# Patient Record
Sex: FEMALE | Race: WHITE | NOT HISPANIC OR LATINO | Employment: UNEMPLOYED | URBAN - METROPOLITAN AREA
[De-identification: names, ages, dates, MRNs, and addresses within clinical notes are randomized per-mention and may not be internally consistent; named-entity substitution may affect disease eponyms.]

---

## 2024-01-01 ENCOUNTER — APPOINTMENT (OUTPATIENT)
Dept: GENERAL RADIOLOGY | Facility: HOSPITAL | Age: 59
DRG: 321 | End: 2024-01-01
Payer: MEDICAID

## 2024-01-01 ENCOUNTER — APPOINTMENT (OUTPATIENT)
Dept: CT IMAGING | Facility: HOSPITAL | Age: 59
DRG: 321 | End: 2024-01-01
Payer: MEDICAID

## 2024-01-01 ENCOUNTER — HOSPITAL ENCOUNTER (INPATIENT)
Facility: HOSPITAL | Age: 59
LOS: 13 days | DRG: 321 | End: 2024-11-04
Attending: EMERGENCY MEDICINE | Admitting: INTERNAL MEDICINE
Payer: MEDICAID

## 2024-01-01 ENCOUNTER — APPOINTMENT (OUTPATIENT)
Dept: CARDIOLOGY | Facility: HOSPITAL | Age: 59
DRG: 321 | End: 2024-01-01
Payer: MEDICAID

## 2024-01-01 ENCOUNTER — APPOINTMENT (OUTPATIENT)
Dept: MRI IMAGING | Facility: HOSPITAL | Age: 59
DRG: 321 | End: 2024-01-01
Payer: MEDICAID

## 2024-01-01 ENCOUNTER — ANESTHESIA (OUTPATIENT)
Dept: GASTROENTEROLOGY | Facility: HOSPITAL | Age: 59
End: 2024-01-01
Payer: MEDICAID

## 2024-01-01 ENCOUNTER — ANESTHESIA EVENT (OUTPATIENT)
Dept: GASTROENTEROLOGY | Facility: HOSPITAL | Age: 59
End: 2024-01-01
Payer: MEDICAID

## 2024-01-01 VITALS
HEIGHT: 61 IN | WEIGHT: 131.84 LBS | TEMPERATURE: 97.3 F | BODY MASS INDEX: 24.89 KG/M2 | SYSTOLIC BLOOD PRESSURE: 147 MMHG | DIASTOLIC BLOOD PRESSURE: 101 MMHG

## 2024-01-01 DIAGNOSIS — Z91.148 H/O MEDICATION NONCOMPLIANCE: ICD-10-CM

## 2024-01-01 DIAGNOSIS — J81.0 ACUTE PULMONARY EDEMA: ICD-10-CM

## 2024-01-01 DIAGNOSIS — I69.391 DYSPHAGIA AS LATE EFFECT OF CEREBROVASCULAR ACCIDENT (CVA): ICD-10-CM

## 2024-01-01 DIAGNOSIS — F17.200 SMOKER: ICD-10-CM

## 2024-01-01 DIAGNOSIS — I21.3 ACUTE ST ELEVATION MYOCARDIAL INFARCTION (STEMI), UNSPECIFIED ARTERY: Primary | ICD-10-CM

## 2024-01-01 LAB
ALBUMIN SERPL-MCNC: 2.4 G/DL (ref 3.5–5.2)
ALBUMIN SERPL-MCNC: 2.5 G/DL (ref 3.5–5.2)
ALBUMIN SERPL-MCNC: 2.5 G/DL (ref 3.5–5.2)
ALBUMIN SERPL-MCNC: 2.6 G/DL (ref 3.5–5.2)
ALBUMIN SERPL-MCNC: 2.7 G/DL (ref 3.5–5.2)
ALBUMIN SERPL-MCNC: 2.8 G/DL (ref 3.5–5.2)
ALBUMIN SERPL-MCNC: 2.8 G/DL (ref 3.5–5.2)
ALBUMIN SERPL-MCNC: 3 G/DL (ref 3.5–5.2)
ALBUMIN SERPL-MCNC: 3 G/DL (ref 3.5–5.2)
ALBUMIN SERPL-MCNC: 3.1 G/DL (ref 3.5–5.2)
ALBUMIN SERPL-MCNC: 3.2 G/DL (ref 3.5–5.2)
ALBUMIN SERPL-MCNC: 3.8 G/DL (ref 3.5–5.2)
ALBUMIN/GLOB SERPL: 0.5 G/DL
ALBUMIN/GLOB SERPL: 0.6 G/DL
ALBUMIN/GLOB SERPL: 0.7 G/DL
ALBUMIN/GLOB SERPL: 0.7 G/DL
ALBUMIN/GLOB SERPL: 0.9 G/DL
ALBUMIN/GLOB SERPL: 0.9 G/DL
ALP SERPL-CCNC: 115 U/L (ref 39–117)
ALP SERPL-CCNC: 153 U/L (ref 39–117)
ALP SERPL-CCNC: 154 U/L (ref 39–117)
ALP SERPL-CCNC: 159 U/L (ref 39–117)
ALP SERPL-CCNC: 159 U/L (ref 39–117)
ALP SERPL-CCNC: 165 U/L (ref 39–117)
ALP SERPL-CCNC: 183 U/L (ref 39–117)
ALP SERPL-CCNC: 185 U/L (ref 39–117)
ALP SERPL-CCNC: 194 U/L (ref 39–117)
ALT SERPL W P-5'-P-CCNC: 24 U/L (ref 1–33)
ALT SERPL W P-5'-P-CCNC: 32 U/L (ref 1–33)
ALT SERPL W P-5'-P-CCNC: 33 U/L (ref 1–33)
ALT SERPL W P-5'-P-CCNC: 34 U/L (ref 1–33)
ALT SERPL W P-5'-P-CCNC: 37 U/L (ref 1–33)
ALT SERPL W P-5'-P-CCNC: 38 U/L (ref 1–33)
ALT SERPL W P-5'-P-CCNC: 38 U/L (ref 1–33)
ALT SERPL W P-5'-P-CCNC: 39 U/L (ref 1–33)
ALT SERPL W P-5'-P-CCNC: 41 U/L (ref 1–33)
AMPHET+METHAMPHET UR QL: POSITIVE
ANION GAP SERPL CALCULATED.3IONS-SCNC: 10.7 MMOL/L (ref 5–15)
ANION GAP SERPL CALCULATED.3IONS-SCNC: 10.9 MMOL/L (ref 5–15)
ANION GAP SERPL CALCULATED.3IONS-SCNC: 11 MMOL/L (ref 5–15)
ANION GAP SERPL CALCULATED.3IONS-SCNC: 11 MMOL/L (ref 5–15)
ANION GAP SERPL CALCULATED.3IONS-SCNC: 11.2 MMOL/L (ref 5–15)
ANION GAP SERPL CALCULATED.3IONS-SCNC: 11.7 MMOL/L (ref 5–15)
ANION GAP SERPL CALCULATED.3IONS-SCNC: 11.7 MMOL/L (ref 5–15)
ANION GAP SERPL CALCULATED.3IONS-SCNC: 12.3 MMOL/L (ref 5–15)
ANION GAP SERPL CALCULATED.3IONS-SCNC: 12.6 MMOL/L (ref 5–15)
ANION GAP SERPL CALCULATED.3IONS-SCNC: 14 MMOL/L (ref 5–15)
ANION GAP SERPL CALCULATED.3IONS-SCNC: 14.7 MMOL/L (ref 5–15)
ANION GAP SERPL CALCULATED.3IONS-SCNC: 15 MMOL/L (ref 5–15)
ANION GAP SERPL CALCULATED.3IONS-SCNC: 16 MMOL/L (ref 5–15)
ANION GAP SERPL CALCULATED.3IONS-SCNC: 8.4 MMOL/L (ref 5–15)
ANION GAP SERPL CALCULATED.3IONS-SCNC: 9.3 MMOL/L (ref 5–15)
ANION GAP SERPL CALCULATED.3IONS-SCNC: 9.8 MMOL/L (ref 5–15)
AORTIC DIMENSIONLESS INDEX: 0.6 (DI)
APTT PPP: 133.1 SECONDS (ref 22.7–35.4)
APTT PPP: 22.4 SECONDS (ref 22.7–35.4)
APTT PPP: 23.6 SECONDS (ref 22.7–35.4)
APTT PPP: 49.8 SECONDS (ref 22.7–35.4)
ARTERIAL PATENCY WRIST A: ABNORMAL
ARTERIAL PATENCY WRIST A: POSITIVE
ASCENDING AORTA: 2.7 CM
AST SERPL-CCNC: 138 U/L (ref 1–32)
AST SERPL-CCNC: 39 U/L (ref 1–32)
AST SERPL-CCNC: 41 U/L (ref 1–32)
AST SERPL-CCNC: 41 U/L (ref 1–32)
AST SERPL-CCNC: 46 U/L (ref 1–32)
AST SERPL-CCNC: 51 U/L (ref 1–32)
AST SERPL-CCNC: 58 U/L (ref 1–32)
AST SERPL-CCNC: 62 U/L (ref 1–32)
AST SERPL-CCNC: 80 U/L (ref 1–32)
ATMOSPHERIC PRESS: 749.4 MMHG
ATMOSPHERIC PRESS: 751 MMHG
ATMOSPHERIC PRESS: 751.6 MMHG
ATMOSPHERIC PRESS: 752.8 MMHG
ATMOSPHERIC PRESS: 753.4 MMHG
B-OH-BUTYR SERPL-SCNC: 0.09 MMOL/L (ref 0.02–0.27)
BACTERIA SPEC AEROBE CULT: ABNORMAL
BACTERIA SPEC AEROBE CULT: NORMAL
BACTERIA SPEC AEROBE CULT: NORMAL
BACTERIA SPEC RESP CULT: ABNORMAL
BACTERIA UR QL AUTO: ABNORMAL /HPF
BACTERIA UR QL AUTO: ABNORMAL /HPF
BARBITURATES UR QL SCN: NEGATIVE
BASE EXCESS BLDA CALC-SCNC: -0.9 MMOL/L (ref 0–2)
BASE EXCESS BLDA CALC-SCNC: 0.4 MMOL/L (ref 0–2)
BASE EXCESS BLDA CALC-SCNC: 2.1 MMOL/L (ref 0–2)
BASE EXCESS BLDA CALC-SCNC: 3.5 MMOL/L (ref 0–2)
BASE EXCESS BLDA CALC-SCNC: 4.7 MMOL/L (ref 0–2)
BASOPHILS # BLD AUTO: 0.06 10*3/MM3 (ref 0–0.2)
BASOPHILS # BLD AUTO: 0.06 10*3/MM3 (ref 0–0.2)
BASOPHILS # BLD AUTO: 0.07 10*3/MM3 (ref 0–0.2)
BASOPHILS NFR BLD AUTO: 0.4 % (ref 0–1.5)
BDY SITE: ABNORMAL
BENZODIAZ UR QL SCN: POSITIVE
BH CV ECHO MEAS - ACS: 1.71 CM
BH CV ECHO MEAS - AO MAX PG: 6.9 MMHG
BH CV ECHO MEAS - AO MEAN PG: 4 MMHG
BH CV ECHO MEAS - AO ROOT DIAM: 2.9 CM
BH CV ECHO MEAS - AO V2 MAX: 131 CM/SEC
BH CV ECHO MEAS - AO V2 VTI: 15.5 CM
BH CV ECHO MEAS - AVA(I,D): 1.68 CM2
BH CV ECHO MEAS - EDV(CUBED): 65.7 ML
BH CV ECHO MEAS - EDV(MOD-SP2): 112 ML
BH CV ECHO MEAS - EDV(MOD-SP4): 105 ML
BH CV ECHO MEAS - EF(MOD-BP): 14.2 %
BH CV ECHO MEAS - EF(MOD-SP2): 15.2 %
BH CV ECHO MEAS - EF(MOD-SP4): 14.3 %
BH CV ECHO MEAS - ESV(MOD-SP2): 95 ML
BH CV ECHO MEAS - ESV(MOD-SP4): 90 ML
BH CV ECHO MEAS - IVS/LVPW: 0.66 CM
BH CV ECHO MEAS - IVSD: 1.25 CM
BH CV ECHO MEAS - LAT PEAK E' VEL: 5.4 CM/SEC
BH CV ECHO MEAS - LV MASS(C)D: 251.6 GRAMS
BH CV ECHO MEAS - LV MAX PG: 3 MMHG
BH CV ECHO MEAS - LV MEAN PG: 2 MMHG
BH CV ECHO MEAS - LV V1 MAX: 87 CM/SEC
BH CV ECHO MEAS - LV V1 VTI: 10 CM
BH CV ECHO MEAS - LVIDD: 4 CM
BH CV ECHO MEAS - LVOT AREA: 2.6 CM2
BH CV ECHO MEAS - LVOT DIAM: 1.82 CM
BH CV ECHO MEAS - LVPWD: 1.88 CM
BH CV ECHO MEAS - MED PEAK E' VEL: 4.4 CM/SEC
BH CV ECHO MEAS - MR MAX PG: 72.8 MMHG
BH CV ECHO MEAS - MR MAX VEL: 426.7 CM/SEC
BH CV ECHO MEAS - MV A DUR: 0.08 SEC
BH CV ECHO MEAS - MV A MAX VEL: 68.6 CM/SEC
BH CV ECHO MEAS - MV DEC SLOPE: 942.5 CM/SEC2
BH CV ECHO MEAS - MV DEC TIME: 0.1 SEC
BH CV ECHO MEAS - MV E MAX VEL: 54.4 CM/SEC
BH CV ECHO MEAS - MV E/A: 0.79
BH CV ECHO MEAS - MV MAX PG: 2.9 MMHG
BH CV ECHO MEAS - MV MEAN PG: 1.83 MMHG
BH CV ECHO MEAS - MV P1/2T: 25.9 MSEC
BH CV ECHO MEAS - MV V2 VTI: 14.4 CM
BH CV ECHO MEAS - MVA(P1/2T): 8.5 CM2
BH CV ECHO MEAS - MVA(VTI): 1.81 CM2
BH CV ECHO MEAS - PA ACC TIME: 0.06 SEC
BH CV ECHO MEAS - PA V2 MAX: 90.2 CM/SEC
BH CV ECHO MEAS - PULM A REVS DUR: 0.07 SEC
BH CV ECHO MEAS - PULM A REVS VEL: 18 CM/SEC
BH CV ECHO MEAS - PULM DIAS VEL: 39.8 CM/SEC
BH CV ECHO MEAS - PULM S/D: 0.59
BH CV ECHO MEAS - PULM SYS VEL: 23.6 CM/SEC
BH CV ECHO MEAS - RAP SYSTOLE: 8 MMHG
BH CV ECHO MEAS - RV MAX PG: 2.25 MMHG
BH CV ECHO MEAS - RV V1 MAX: 75 CM/SEC
BH CV ECHO MEAS - RV V1 VTI: 8 CM
BH CV ECHO MEAS - SUP REN AO DIAM: 2.1 CM
BH CV ECHO MEAS - SV(LVOT): 26 ML
BH CV ECHO MEAS - SV(MOD-SP2): 17 ML
BH CV ECHO MEAS - SV(MOD-SP4): 15 ML
BH CV ECHO MEAS - TAPSE (>1.6): 0.96 CM
BH CV ECHO MEASUREMENTS AVERAGE E/E' RATIO: 11.1
BH CV XLRA - RV BASE: 2.6 CM
BH CV XLRA - RV LENGTH: 5.3 CM
BH CV XLRA - RV MID: 2.01 CM
BH CV XLRA - TDI S': 8.6 CM/SEC
BH CV XLRA MEAS LEFT DIST CCA EDV: -20 CM/SEC
BH CV XLRA MEAS LEFT DIST CCA PSV: -55.3 CM/SEC
BH CV XLRA MEAS LEFT DIST ICA EDV: -27.6 CM/SEC
BH CV XLRA MEAS LEFT DIST ICA PSV: -49.9 CM/SEC
BH CV XLRA MEAS LEFT ICA/CCA RATIO: 0.9
BH CV XLRA MEAS LEFT MID ICA EDV: -23.8 CM/SEC
BH CV XLRA MEAS LEFT MID ICA PSV: -47 CM/SEC
BH CV XLRA MEAS LEFT PROX CCA EDV: 24.3 CM/SEC
BH CV XLRA MEAS LEFT PROX CCA PSV: 58.4 CM/SEC
BH CV XLRA MEAS LEFT PROX ECA EDV: -12.3 CM/SEC
BH CV XLRA MEAS LEFT PROX ECA PSV: -78.6 CM/SEC
BH CV XLRA MEAS LEFT PROX ICA EDV: -18.2 CM/SEC
BH CV XLRA MEAS LEFT PROX ICA PSV: -48 CM/SEC
BH CV XLRA MEAS LEFT PROX SCLA PSV: 86.4 CM/SEC
BH CV XLRA MEAS LEFT VERTEBRAL A EDV: 13.2 CM/SEC
BH CV XLRA MEAS LEFT VERTEBRAL A PSV: 26.6 CM/SEC
BH CV XLRA MEAS RIGHT DIST CCA EDV: -16.3 CM/SEC
BH CV XLRA MEAS RIGHT DIST CCA PSV: -40 CM/SEC
BH CV XLRA MEAS RIGHT DIST ICA EDV: -29.3 CM/SEC
BH CV XLRA MEAS RIGHT DIST ICA PSV: -53.2 CM/SEC
BH CV XLRA MEAS RIGHT ICA/CCA RATIO: 1.33
BH CV XLRA MEAS RIGHT MID ICA EDV: -25.3 CM/SEC
BH CV XLRA MEAS RIGHT MID ICA PSV: -48.1 CM/SEC
BH CV XLRA MEAS RIGHT PROX CCA EDV: 13.3 CM/SEC
BH CV XLRA MEAS RIGHT PROX CCA PSV: 56.6 CM/SEC
BH CV XLRA MEAS RIGHT PROX ECA EDV: -11.9 CM/SEC
BH CV XLRA MEAS RIGHT PROX ECA PSV: -83.9 CM/SEC
BH CV XLRA MEAS RIGHT PROX ICA EDV: -24.1 CM/SEC
BH CV XLRA MEAS RIGHT PROX ICA PSV: -43.5 CM/SEC
BH CV XLRA MEAS RIGHT PROX SCLA PSV: 92.7 CM/SEC
BH CV XLRA MEAS RIGHT VERTEBRAL A EDV: 22 CM/SEC
BH CV XLRA MEAS RIGHT VERTEBRAL A PSV: 44.7 CM/SEC
BILIRUB CONJ SERPL-MCNC: <0.2 MG/DL (ref 0–0.3)
BILIRUB INDIRECT SERPL-MCNC: ABNORMAL MG/DL
BILIRUB SERPL-MCNC: 0.2 MG/DL (ref 0–1.2)
BILIRUB SERPL-MCNC: 0.3 MG/DL (ref 0–1.2)
BILIRUB SERPL-MCNC: 0.4 MG/DL (ref 0–1.2)
BILIRUB SERPL-MCNC: 0.5 MG/DL (ref 0–1.2)
BILIRUB SERPL-MCNC: 0.6 MG/DL (ref 0–1.2)
BILIRUB SERPL-MCNC: 0.7 MG/DL (ref 0–1.2)
BILIRUB UR QL STRIP: NEGATIVE
BILIRUB UR QL STRIP: NEGATIVE
BUN SERPL-MCNC: 21 MG/DL (ref 6–20)
BUN SERPL-MCNC: 22 MG/DL (ref 6–20)
BUN SERPL-MCNC: 22 MG/DL (ref 6–20)
BUN SERPL-MCNC: 30 MG/DL (ref 6–20)
BUN SERPL-MCNC: 33 MG/DL (ref 6–20)
BUN SERPL-MCNC: 34 MG/DL (ref 6–20)
BUN SERPL-MCNC: 34 MG/DL (ref 6–20)
BUN SERPL-MCNC: 35 MG/DL (ref 6–20)
BUN SERPL-MCNC: 36 MG/DL (ref 6–20)
BUN SERPL-MCNC: 38 MG/DL (ref 6–20)
BUN SERPL-MCNC: 42 MG/DL (ref 6–20)
BUN SERPL-MCNC: 43 MG/DL (ref 6–20)
BUN SERPL-MCNC: 46 MG/DL (ref 6–20)
BUN SERPL-MCNC: 49 MG/DL (ref 6–20)
BUN SERPL-MCNC: 50 MG/DL (ref 6–20)
BUN SERPL-MCNC: 53 MG/DL (ref 6–20)
BUN/CREAT SERPL: 13.6 (ref 7–25)
BUN/CREAT SERPL: 15 (ref 7–25)
BUN/CREAT SERPL: 16.3 (ref 7–25)
BUN/CREAT SERPL: 18.4 (ref 7–25)
BUN/CREAT SERPL: 20.7 (ref 7–25)
BUN/CREAT SERPL: 24.6 (ref 7–25)
BUN/CREAT SERPL: 27.3 (ref 7–25)
BUN/CREAT SERPL: 28.7 (ref 7–25)
BUN/CREAT SERPL: 31.3 (ref 7–25)
BUN/CREAT SERPL: 33.1 (ref 7–25)
BUN/CREAT SERPL: 33.3 (ref 7–25)
BUN/CREAT SERPL: 34.4 (ref 7–25)
BUN/CREAT SERPL: 36 (ref 7–25)
BUN/CREAT SERPL: 36.5 (ref 7–25)
BUN/CREAT SERPL: 36.8 (ref 7–25)
BUN/CREAT SERPL: 39.5 (ref 7–25)
CALCIUM SPEC-SCNC: 10.1 MG/DL (ref 8.6–10.5)
CALCIUM SPEC-SCNC: 10.2 MG/DL (ref 8.6–10.5)
CALCIUM SPEC-SCNC: 11 MG/DL (ref 8.6–10.5)
CALCIUM SPEC-SCNC: 11.3 MG/DL (ref 8.6–10.5)
CALCIUM SPEC-SCNC: 8.3 MG/DL (ref 8.6–10.5)
CALCIUM SPEC-SCNC: 8.4 MG/DL (ref 8.6–10.5)
CALCIUM SPEC-SCNC: 8.4 MG/DL (ref 8.6–10.5)
CALCIUM SPEC-SCNC: 8.5 MG/DL (ref 8.6–10.5)
CALCIUM SPEC-SCNC: 8.5 MG/DL (ref 8.6–10.5)
CALCIUM SPEC-SCNC: 8.6 MG/DL (ref 8.6–10.5)
CALCIUM SPEC-SCNC: 8.8 MG/DL (ref 8.6–10.5)
CALCIUM SPEC-SCNC: 8.8 MG/DL (ref 8.6–10.5)
CALCIUM SPEC-SCNC: 9 MG/DL (ref 8.6–10.5)
CALCIUM SPEC-SCNC: 9.2 MG/DL (ref 8.6–10.5)
CALCIUM SPEC-SCNC: 9.4 MG/DL (ref 8.6–10.5)
CALCIUM SPEC-SCNC: 9.4 MG/DL (ref 8.6–10.5)
CANNABINOIDS SERPL QL: NEGATIVE
CHLORIDE SERPL-SCNC: 100 MMOL/L (ref 98–107)
CHLORIDE SERPL-SCNC: 101 MMOL/L (ref 98–107)
CHLORIDE SERPL-SCNC: 101 MMOL/L (ref 98–107)
CHLORIDE SERPL-SCNC: 108 MMOL/L (ref 98–107)
CHLORIDE SERPL-SCNC: 109 MMOL/L (ref 98–107)
CHLORIDE SERPL-SCNC: 110 MMOL/L (ref 98–107)
CHLORIDE SERPL-SCNC: 110 MMOL/L (ref 98–107)
CHLORIDE SERPL-SCNC: 111 MMOL/L (ref 98–107)
CHLORIDE SERPL-SCNC: 113 MMOL/L (ref 98–107)
CHLORIDE SERPL-SCNC: 86 MMOL/L (ref 98–107)
CHLORIDE SERPL-SCNC: 90 MMOL/L (ref 98–107)
CHLORIDE SERPL-SCNC: 98 MMOL/L (ref 98–107)
CHLORIDE SERPL-SCNC: 99 MMOL/L (ref 98–107)
CHOLEST SERPL-MCNC: 245 MG/DL (ref 0–200)
CLARITY UR: ABNORMAL
CLARITY UR: CLEAR
CO2 BLDA-SCNC: 24.8 MMOL/L (ref 23–27)
CO2 BLDA-SCNC: 26.8 MMOL/L (ref 23–27)
CO2 BLDA-SCNC: 27.4 MMOL/L (ref 23–27)
CO2 BLDA-SCNC: 28 MMOL/L (ref 23–27)
CO2 SERPL-SCNC: 23 MMOL/L (ref 22–29)
CO2 SERPL-SCNC: 23.3 MMOL/L (ref 22–29)
CO2 SERPL-SCNC: 23.7 MMOL/L (ref 22–29)
CO2 SERPL-SCNC: 24 MMOL/L (ref 22–29)
CO2 SERPL-SCNC: 24.3 MMOL/L (ref 22–29)
CO2 SERPL-SCNC: 24.8 MMOL/L (ref 22–29)
CO2 SERPL-SCNC: 25 MMOL/L (ref 22–29)
CO2 SERPL-SCNC: 25.4 MMOL/L (ref 22–29)
CO2 SERPL-SCNC: 26.3 MMOL/L (ref 22–29)
CO2 SERPL-SCNC: 27.3 MMOL/L (ref 22–29)
CO2 SERPL-SCNC: 28.1 MMOL/L (ref 22–29)
CO2 SERPL-SCNC: 28.2 MMOL/L (ref 22–29)
CO2 SERPL-SCNC: 28.6 MMOL/L (ref 22–29)
CO2 SERPL-SCNC: 29 MMOL/L (ref 22–29)
CO2 SERPL-SCNC: 30 MMOL/L (ref 22–29)
CO2 SERPL-SCNC: 30.7 MMOL/L (ref 22–29)
COCAINE UR QL: NEGATIVE
COLOR UR: ABNORMAL
COLOR UR: YELLOW
CREAT SERPL-MCNC: 0.99 MG/DL (ref 0.57–1)
CREAT SERPL-MCNC: 1 MG/DL (ref 0.57–1)
CREAT SERPL-MCNC: 1.12 MG/DL (ref 0.57–1)
CREAT SERPL-MCNC: 1.22 MG/DL (ref 0.57–1)
CREAT SERPL-MCNC: 1.24 MG/DL (ref 0.57–1)
CREAT SERPL-MCNC: 1.26 MG/DL (ref 0.57–1)
CREAT SERPL-MCNC: 1.35 MG/DL (ref 0.57–1)
CREAT SERPL-MCNC: 1.36 MG/DL (ref 0.57–1)
CREAT SERPL-MCNC: 1.38 MG/DL (ref 0.57–1)
CREAT SERPL-MCNC: 1.39 MG/DL (ref 0.57–1)
CREAT SERPL-MCNC: 1.47 MG/DL (ref 0.57–1)
CREAT SERPL-MCNC: 1.5 MG/DL (ref 0.57–1)
CREAT SERPL-MCNC: 1.54 MG/DL (ref 0.57–1)
CREAT SERPL-MCNC: 1.6 MG/DL (ref 0.57–1)
CREAT SERPL-MCNC: 1.63 MG/DL (ref 0.57–1)
CREAT SERPL-MCNC: 1.64 MG/DL (ref 0.57–1)
CREAT UR-MCNC: 79.7 MG/DL
D-LACTATE SERPL-SCNC: 2.4 MMOL/L (ref 0.5–2)
D-LACTATE SERPL-SCNC: 3.5 MMOL/L
D-LACTATE SERPL-SCNC: 3.5 MMOL/L (ref 0.5–2)
D-LACTATE SERPL-SCNC: 5.1 MMOL/L (ref 0.5–2)
D-LACTATE SERPL-SCNC: 5.4 MMOL/L (ref 0.5–2)
D-LACTATE SERPL-SCNC: 5.9 MMOL/L (ref 0.5–2)
DEPRECATED RDW RBC AUTO: 39.1 FL (ref 37–54)
DEPRECATED RDW RBC AUTO: 39.6 FL (ref 37–54)
DEPRECATED RDW RBC AUTO: 40 FL (ref 37–54)
DEPRECATED RDW RBC AUTO: 40 FL (ref 37–54)
DEPRECATED RDW RBC AUTO: 40.1 FL (ref 37–54)
DEPRECATED RDW RBC AUTO: 40.2 FL (ref 37–54)
DEPRECATED RDW RBC AUTO: 40.4 FL (ref 37–54)
DEPRECATED RDW RBC AUTO: 40.5 FL (ref 37–54)
DEPRECATED RDW RBC AUTO: 40.5 FL (ref 37–54)
DEPRECATED RDW RBC AUTO: 40.6 FL (ref 37–54)
DEPRECATED RDW RBC AUTO: 41 FL (ref 37–54)
DEPRECATED RDW RBC AUTO: 41.5 FL (ref 37–54)
DEPRECATED RDW RBC AUTO: 41.9 FL (ref 37–54)
DEPRECATED RDW RBC AUTO: 42.3 FL (ref 37–54)
DEPRECATED RDW RBC AUTO: 43.6 FL (ref 37–54)
DEVICE COMMENT: ABNORMAL
EGFRCR SERPLBLD CKD-EPI 2021: 36.1 ML/MIN/1.73
EGFRCR SERPLBLD CKD-EPI 2021: 36.4 ML/MIN/1.73
EGFRCR SERPLBLD CKD-EPI 2021: 37.2 ML/MIN/1.73
EGFRCR SERPLBLD CKD-EPI 2021: 39 ML/MIN/1.73
EGFRCR SERPLBLD CKD-EPI 2021: 40.2 ML/MIN/1.73
EGFRCR SERPLBLD CKD-EPI 2021: 41.2 ML/MIN/1.73
EGFRCR SERPLBLD CKD-EPI 2021: 44.1 ML/MIN/1.73
EGFRCR SERPLBLD CKD-EPI 2021: 44.5 ML/MIN/1.73
EGFRCR SERPLBLD CKD-EPI 2021: 45.2 ML/MIN/1.73
EGFRCR SERPLBLD CKD-EPI 2021: 45.6 ML/MIN/1.73
EGFRCR SERPLBLD CKD-EPI 2021: 49.6 ML/MIN/1.73
EGFRCR SERPLBLD CKD-EPI 2021: 50.5 ML/MIN/1.73
EGFRCR SERPLBLD CKD-EPI 2021: 51.5 ML/MIN/1.73
EGFRCR SERPLBLD CKD-EPI 2021: 57.1 ML/MIN/1.73
EGFRCR SERPLBLD CKD-EPI 2021: 65.4 ML/MIN/1.73
EGFRCR SERPLBLD CKD-EPI 2021: 66.2 ML/MIN/1.73
EOSINOPHIL # BLD AUTO: 0.01 10*3/MM3 (ref 0–0.4)
EOSINOPHIL # BLD AUTO: 0.01 10*3/MM3 (ref 0–0.4)
EOSINOPHIL # BLD AUTO: 0.16 10*3/MM3 (ref 0–0.4)
EOSINOPHIL NFR BLD AUTO: 0.1 % (ref 0.3–6.2)
EOSINOPHIL NFR BLD AUTO: 0.1 % (ref 0.3–6.2)
EOSINOPHIL NFR BLD AUTO: 1.1 % (ref 0.3–6.2)
ERYTHROCYTE [DISTWIDTH] IN BLOOD BY AUTOMATED COUNT: 12 % (ref 12.3–15.4)
ERYTHROCYTE [DISTWIDTH] IN BLOOD BY AUTOMATED COUNT: 12.3 % (ref 12.3–15.4)
ERYTHROCYTE [DISTWIDTH] IN BLOOD BY AUTOMATED COUNT: 12.4 % (ref 12.3–15.4)
ERYTHROCYTE [DISTWIDTH] IN BLOOD BY AUTOMATED COUNT: 12.4 % (ref 12.3–15.4)
ERYTHROCYTE [DISTWIDTH] IN BLOOD BY AUTOMATED COUNT: 12.5 % (ref 12.3–15.4)
ERYTHROCYTE [DISTWIDTH] IN BLOOD BY AUTOMATED COUNT: 12.6 % (ref 12.3–15.4)
ERYTHROCYTE [DISTWIDTH] IN BLOOD BY AUTOMATED COUNT: 12.6 % (ref 12.3–15.4)
ERYTHROCYTE [DISTWIDTH] IN BLOOD BY AUTOMATED COUNT: 12.7 % (ref 12.3–15.4)
ERYTHROCYTE [DISTWIDTH] IN BLOOD BY AUTOMATED COUNT: 12.7 % (ref 12.3–15.4)
ERYTHROCYTE [DISTWIDTH] IN BLOOD BY AUTOMATED COUNT: 12.8 % (ref 12.3–15.4)
ERYTHROCYTE [DISTWIDTH] IN BLOOD BY AUTOMATED COUNT: 12.9 % (ref 12.3–15.4)
EXPIRATORY TIME: 38
FENTANYL UR-MCNC: NEGATIVE NG/ML
GEN 5 2HR TROPONIN T REFLEX: ABNORMAL NG/L
GLOBULIN UR ELPH-MCNC: 3.7 GM/DL
GLOBULIN UR ELPH-MCNC: 4.3 GM/DL
GLOBULIN UR ELPH-MCNC: 4.4 GM/DL
GLOBULIN UR ELPH-MCNC: 4.4 GM/DL
GLOBULIN UR ELPH-MCNC: 4.5 GM/DL
GLOBULIN UR ELPH-MCNC: 5.2 GM/DL
GLUCOSE BLDC GLUCOMTR-MCNC: 103 MG/DL (ref 70–130)
GLUCOSE BLDC GLUCOMTR-MCNC: 109 MG/DL (ref 70–130)
GLUCOSE BLDC GLUCOMTR-MCNC: 113 MG/DL (ref 70–130)
GLUCOSE BLDC GLUCOMTR-MCNC: 126 MG/DL (ref 70–130)
GLUCOSE BLDC GLUCOMTR-MCNC: 127 MG/DL (ref 70–130)
GLUCOSE BLDC GLUCOMTR-MCNC: 132 MG/DL (ref 70–130)
GLUCOSE BLDC GLUCOMTR-MCNC: 134 MG/DL (ref 70–130)
GLUCOSE BLDC GLUCOMTR-MCNC: 134 MG/DL (ref 70–130)
GLUCOSE BLDC GLUCOMTR-MCNC: 135 MG/DL (ref 70–130)
GLUCOSE BLDC GLUCOMTR-MCNC: 137 MG/DL (ref 70–130)
GLUCOSE BLDC GLUCOMTR-MCNC: 139 MG/DL (ref 70–130)
GLUCOSE BLDC GLUCOMTR-MCNC: 146 MG/DL (ref 70–130)
GLUCOSE BLDC GLUCOMTR-MCNC: 147 MG/DL (ref 70–130)
GLUCOSE BLDC GLUCOMTR-MCNC: 148 MG/DL (ref 70–130)
GLUCOSE BLDC GLUCOMTR-MCNC: 148 MG/DL (ref 70–130)
GLUCOSE BLDC GLUCOMTR-MCNC: 150 MG/DL (ref 70–130)
GLUCOSE BLDC GLUCOMTR-MCNC: 151 MG/DL (ref 70–130)
GLUCOSE BLDC GLUCOMTR-MCNC: 151 MG/DL (ref 70–130)
GLUCOSE BLDC GLUCOMTR-MCNC: 152 MG/DL (ref 70–130)
GLUCOSE BLDC GLUCOMTR-MCNC: 154 MG/DL (ref 70–130)
GLUCOSE BLDC GLUCOMTR-MCNC: 154 MG/DL (ref 70–130)
GLUCOSE BLDC GLUCOMTR-MCNC: 156 MG/DL (ref 70–130)
GLUCOSE BLDC GLUCOMTR-MCNC: 156 MG/DL (ref 70–130)
GLUCOSE BLDC GLUCOMTR-MCNC: 157 MG/DL (ref 70–130)
GLUCOSE BLDC GLUCOMTR-MCNC: 157 MG/DL (ref 70–130)
GLUCOSE BLDC GLUCOMTR-MCNC: 159 MG/DL (ref 70–130)
GLUCOSE BLDC GLUCOMTR-MCNC: 160 MG/DL (ref 70–130)
GLUCOSE BLDC GLUCOMTR-MCNC: 161 MG/DL (ref 70–130)
GLUCOSE BLDC GLUCOMTR-MCNC: 161 MG/DL (ref 70–130)
GLUCOSE BLDC GLUCOMTR-MCNC: 162 MG/DL (ref 70–130)
GLUCOSE BLDC GLUCOMTR-MCNC: 169 MG/DL (ref 70–130)
GLUCOSE BLDC GLUCOMTR-MCNC: 169 MG/DL (ref 70–130)
GLUCOSE BLDC GLUCOMTR-MCNC: 172 MG/DL (ref 70–130)
GLUCOSE BLDC GLUCOMTR-MCNC: 172 MG/DL (ref 70–130)
GLUCOSE BLDC GLUCOMTR-MCNC: 173 MG/DL (ref 70–130)
GLUCOSE BLDC GLUCOMTR-MCNC: 178 MG/DL (ref 70–130)
GLUCOSE BLDC GLUCOMTR-MCNC: 179 MG/DL (ref 70–130)
GLUCOSE BLDC GLUCOMTR-MCNC: 180 MG/DL (ref 70–130)
GLUCOSE BLDC GLUCOMTR-MCNC: 180 MG/DL (ref 70–130)
GLUCOSE BLDC GLUCOMTR-MCNC: 181 MG/DL (ref 70–130)
GLUCOSE BLDC GLUCOMTR-MCNC: 182 MG/DL (ref 70–130)
GLUCOSE BLDC GLUCOMTR-MCNC: 185 MG/DL (ref 70–130)
GLUCOSE BLDC GLUCOMTR-MCNC: 186 MG/DL (ref 70–130)
GLUCOSE BLDC GLUCOMTR-MCNC: 189 MG/DL (ref 70–130)
GLUCOSE BLDC GLUCOMTR-MCNC: 191 MG/DL (ref 70–130)
GLUCOSE BLDC GLUCOMTR-MCNC: 192 MG/DL (ref 70–130)
GLUCOSE BLDC GLUCOMTR-MCNC: 194 MG/DL (ref 70–130)
GLUCOSE BLDC GLUCOMTR-MCNC: 197 MG/DL (ref 70–130)
GLUCOSE BLDC GLUCOMTR-MCNC: 197 MG/DL (ref 70–130)
GLUCOSE BLDC GLUCOMTR-MCNC: 199 MG/DL (ref 70–130)
GLUCOSE BLDC GLUCOMTR-MCNC: 201 MG/DL (ref 70–130)
GLUCOSE BLDC GLUCOMTR-MCNC: 205 MG/DL (ref 70–130)
GLUCOSE BLDC GLUCOMTR-MCNC: 206 MG/DL (ref 70–130)
GLUCOSE BLDC GLUCOMTR-MCNC: 209 MG/DL (ref 70–130)
GLUCOSE BLDC GLUCOMTR-MCNC: 209 MG/DL (ref 70–130)
GLUCOSE BLDC GLUCOMTR-MCNC: 210 MG/DL (ref 70–130)
GLUCOSE BLDC GLUCOMTR-MCNC: 213 MG/DL (ref 70–130)
GLUCOSE BLDC GLUCOMTR-MCNC: 216 MG/DL (ref 70–130)
GLUCOSE BLDC GLUCOMTR-MCNC: 216 MG/DL (ref 70–130)
GLUCOSE BLDC GLUCOMTR-MCNC: 218 MG/DL (ref 70–130)
GLUCOSE BLDC GLUCOMTR-MCNC: 219 MG/DL (ref 70–130)
GLUCOSE BLDC GLUCOMTR-MCNC: 221 MG/DL (ref 70–130)
GLUCOSE BLDC GLUCOMTR-MCNC: 224 MG/DL (ref 70–130)
GLUCOSE BLDC GLUCOMTR-MCNC: 226 MG/DL (ref 70–130)
GLUCOSE BLDC GLUCOMTR-MCNC: 227 MG/DL (ref 70–130)
GLUCOSE BLDC GLUCOMTR-MCNC: 227 MG/DL (ref 70–130)
GLUCOSE BLDC GLUCOMTR-MCNC: 228 MG/DL (ref 70–130)
GLUCOSE BLDC GLUCOMTR-MCNC: 230 MG/DL (ref 70–130)
GLUCOSE BLDC GLUCOMTR-MCNC: 251 MG/DL (ref 70–130)
GLUCOSE BLDC GLUCOMTR-MCNC: 252 MG/DL (ref 70–130)
GLUCOSE BLDC GLUCOMTR-MCNC: 253 MG/DL (ref 70–130)
GLUCOSE BLDC GLUCOMTR-MCNC: 254 MG/DL (ref 70–130)
GLUCOSE BLDC GLUCOMTR-MCNC: 258 MG/DL (ref 70–130)
GLUCOSE BLDC GLUCOMTR-MCNC: 260 MG/DL (ref 70–130)
GLUCOSE BLDC GLUCOMTR-MCNC: 322 MG/DL (ref 70–130)
GLUCOSE BLDC GLUCOMTR-MCNC: 332 MG/DL (ref 70–130)
GLUCOSE BLDC GLUCOMTR-MCNC: 457 MG/DL (ref 70–130)
GLUCOSE BLDC GLUCOMTR-MCNC: 483 MG/DL (ref 70–130)
GLUCOSE BLDC GLUCOMTR-MCNC: 484 MG/DL (ref 70–130)
GLUCOSE BLDC GLUCOMTR-MCNC: 593 MG/DL (ref 70–130)
GLUCOSE BLDC GLUCOMTR-MCNC: 86 MG/DL (ref 70–130)
GLUCOSE BLDC GLUCOMTR-MCNC: 92 MG/DL (ref 70–130)
GLUCOSE BLDC GLUCOMTR-MCNC: 99 MG/DL (ref 70–130)
GLUCOSE BLDC GLUCOMTR-MCNC: >599 MG/DL (ref 70–130)
GLUCOSE SERPL-MCNC: 107 MG/DL (ref 65–99)
GLUCOSE SERPL-MCNC: 131 MG/DL (ref 65–99)
GLUCOSE SERPL-MCNC: 144 MG/DL (ref 65–99)
GLUCOSE SERPL-MCNC: 145 MG/DL (ref 65–99)
GLUCOSE SERPL-MCNC: 160 MG/DL (ref 65–99)
GLUCOSE SERPL-MCNC: 162 MG/DL (ref 65–99)
GLUCOSE SERPL-MCNC: 165 MG/DL (ref 65–99)
GLUCOSE SERPL-MCNC: 166 MG/DL (ref 65–99)
GLUCOSE SERPL-MCNC: 168 MG/DL (ref 65–99)
GLUCOSE SERPL-MCNC: 169 MG/DL (ref 65–99)
GLUCOSE SERPL-MCNC: 178 MG/DL (ref 65–99)
GLUCOSE SERPL-MCNC: 184 MG/DL (ref 65–99)
GLUCOSE SERPL-MCNC: 197 MG/DL (ref 65–99)
GLUCOSE SERPL-MCNC: 226 MG/DL (ref 65–99)
GLUCOSE SERPL-MCNC: 419 MG/DL (ref 65–99)
GLUCOSE SERPL-MCNC: 699 MG/DL (ref 65–99)
GLUCOSE SERPL-MCNC: 803 MG/DL (ref 65–99)
GLUCOSE UR STRIP-MCNC: ABNORMAL MG/DL
GLUCOSE UR STRIP-MCNC: NEGATIVE MG/DL
GRAM STN SPEC: ABNORMAL
GRAM STN SPEC: ABNORMAL
HBA1C MFR BLD: 14.7 % (ref 4.8–5.6)
HCO3 BLDA-SCNC: 23.8 MMOL/L (ref 22–28)
HCO3 BLDA-SCNC: 24.4 MMOL/L (ref 22–28)
HCO3 BLDA-SCNC: 25.7 MMOL/L (ref 22–28)
HCO3 BLDA-SCNC: 26.4 MMOL/L (ref 22–28)
HCO3 BLDA-SCNC: 27 MMOL/L (ref 22–28)
HCT VFR BLD AUTO: 34 % (ref 34–46.6)
HCT VFR BLD AUTO: 34.3 % (ref 34–46.6)
HCT VFR BLD AUTO: 35.7 % (ref 34–46.6)
HCT VFR BLD AUTO: 35.8 % (ref 34–46.6)
HCT VFR BLD AUTO: 36.5 % (ref 34–46.6)
HCT VFR BLD AUTO: 36.6 % (ref 34–46.6)
HCT VFR BLD AUTO: 36.9 % (ref 34–46.6)
HCT VFR BLD AUTO: 36.9 % (ref 34–46.6)
HCT VFR BLD AUTO: 37.5 % (ref 34–46.6)
HCT VFR BLD AUTO: 37.7 % (ref 34–46.6)
HCT VFR BLD AUTO: 37.7 % (ref 34–46.6)
HCT VFR BLD AUTO: 38.9 % (ref 34–46.6)
HCT VFR BLD AUTO: 43 % (ref 34–46.6)
HCT VFR BLD AUTO: 47.2 % (ref 34–46.6)
HCT VFR BLD AUTO: 48.4 % (ref 34–46.6)
HCT VFR BLDA CALC: 49 % (ref 38–51)
HDLC SERPL-MCNC: 37 MG/DL (ref 40–60)
HEMODILUTION: NO
HGB BLD-MCNC: 11.1 G/DL (ref 12–15.9)
HGB BLD-MCNC: 11.4 G/DL (ref 12–15.9)
HGB BLD-MCNC: 11.7 G/DL (ref 12–15.9)
HGB BLD-MCNC: 11.9 G/DL (ref 12–15.9)
HGB BLD-MCNC: 11.9 G/DL (ref 12–15.9)
HGB BLD-MCNC: 12 G/DL (ref 12–15.9)
HGB BLD-MCNC: 12.2 G/DL (ref 12–15.9)
HGB BLD-MCNC: 12.4 G/DL (ref 12–15.9)
HGB BLD-MCNC: 12.4 G/DL (ref 12–15.9)
HGB BLD-MCNC: 12.6 G/DL (ref 12–15.9)
HGB BLD-MCNC: 12.7 G/DL (ref 12–15.9)
HGB BLD-MCNC: 12.8 G/DL (ref 12–15.9)
HGB BLD-MCNC: 14.8 G/DL (ref 12–15.9)
HGB BLD-MCNC: 15.2 G/DL (ref 12–15.9)
HGB BLD-MCNC: 15.9 G/DL (ref 12–15.9)
HGB BLDA-MCNC: 16.6 G/DL (ref 12–17)
HGB UR QL STRIP.AUTO: ABNORMAL
HGB UR QL STRIP.AUTO: ABNORMAL
HYALINE CASTS UR QL AUTO: ABNORMAL /LPF
HYALINE CASTS UR QL AUTO: ABNORMAL /LPF
IMM GRANULOCYTES # BLD AUTO: 0.04 10*3/MM3 (ref 0–0.05)
IMM GRANULOCYTES # BLD AUTO: 0.07 10*3/MM3 (ref 0–0.05)
IMM GRANULOCYTES # BLD AUTO: 0.1 10*3/MM3 (ref 0–0.05)
IMM GRANULOCYTES NFR BLD AUTO: 0.3 % (ref 0–0.5)
IMM GRANULOCYTES NFR BLD AUTO: 0.5 % (ref 0–0.5)
IMM GRANULOCYTES NFR BLD AUTO: 0.6 % (ref 0–0.5)
INHALED O2 CONCENTRATION: 30 %
INHALED O2 CONCENTRATION: 40 %
INHALED O2 CONCENTRATION: 40 %
INR PPP: 1.02 (ref 0.9–1.1)
KETONES UR QL STRIP: NEGATIVE
KETONES UR QL STRIP: NEGATIVE
LDLC SERPL CALC-MCNC: 150 MG/DL (ref 0–100)
LDLC/HDLC SERPL: 3.94 {RATIO}
LEFT ATRIUM VOLUME INDEX: 32.4 ML/M2
LEUKOCYTE ESTERASE UR QL STRIP.AUTO: ABNORMAL
LEUKOCYTE ESTERASE UR QL STRIP.AUTO: ABNORMAL
LYMPHOCYTES # BLD AUTO: 1.31 10*3/MM3 (ref 0.7–3.1)
LYMPHOCYTES # BLD AUTO: 2.46 10*3/MM3 (ref 0.7–3.1)
LYMPHOCYTES # BLD AUTO: 3 10*3/MM3 (ref 0.7–3.1)
LYMPHOCYTES NFR BLD AUTO: 13.7 % (ref 19.6–45.3)
LYMPHOCYTES NFR BLD AUTO: 21 % (ref 19.6–45.3)
LYMPHOCYTES NFR BLD AUTO: 9.7 % (ref 19.6–45.3)
Lab: ABNORMAL
MAGNESIUM SERPL-MCNC: 2.4 MG/DL (ref 1.6–2.6)
MAGNESIUM SERPL-MCNC: 2.4 MG/DL (ref 1.6–2.6)
MAGNESIUM SERPL-MCNC: 2.6 MG/DL (ref 1.6–2.6)
MCH RBC QN AUTO: 28.8 PG (ref 26.6–33)
MCH RBC QN AUTO: 29.1 PG (ref 26.6–33)
MCH RBC QN AUTO: 29.2 PG (ref 26.6–33)
MCH RBC QN AUTO: 29.5 PG (ref 26.6–33)
MCH RBC QN AUTO: 29.7 PG (ref 26.6–33)
MCH RBC QN AUTO: 29.8 PG (ref 26.6–33)
MCH RBC QN AUTO: 29.8 PG (ref 26.6–33)
MCH RBC QN AUTO: 30.5 PG (ref 26.6–33)
MCH RBC QN AUTO: 30.5 PG (ref 26.6–33)
MCH RBC QN AUTO: 31.2 PG (ref 26.6–33)
MCHC RBC AUTO-ENTMCNC: 31.9 G/DL (ref 31.5–35.7)
MCHC RBC AUTO-ENTMCNC: 32.2 G/DL (ref 31.5–35.7)
MCHC RBC AUTO-ENTMCNC: 32.6 G/DL (ref 31.5–35.7)
MCHC RBC AUTO-ENTMCNC: 32.8 G/DL (ref 31.5–35.7)
MCHC RBC AUTO-ENTMCNC: 32.9 G/DL (ref 31.5–35.7)
MCHC RBC AUTO-ENTMCNC: 33.2 G/DL (ref 31.5–35.7)
MCHC RBC AUTO-ENTMCNC: 33.4 G/DL (ref 31.5–35.7)
MCHC RBC AUTO-ENTMCNC: 33.5 G/DL (ref 31.5–35.7)
MCHC RBC AUTO-ENTMCNC: 33.6 G/DL (ref 31.5–35.7)
MCHC RBC AUTO-ENTMCNC: 33.7 G/DL (ref 31.5–35.7)
MCHC RBC AUTO-ENTMCNC: 33.9 G/DL (ref 31.5–35.7)
MCHC RBC AUTO-ENTMCNC: 34 G/DL (ref 31.5–35.7)
MCHC RBC AUTO-ENTMCNC: 34.4 G/DL (ref 31.5–35.7)
MCV RBC AUTO: 86.7 FL (ref 79–97)
MCV RBC AUTO: 87.5 FL (ref 79–97)
MCV RBC AUTO: 87.9 FL (ref 79–97)
MCV RBC AUTO: 88 FL (ref 79–97)
MCV RBC AUTO: 88.2 FL (ref 79–97)
MCV RBC AUTO: 88.7 FL (ref 79–97)
MCV RBC AUTO: 89.2 FL (ref 79–97)
MCV RBC AUTO: 89.8 FL (ref 79–97)
MCV RBC AUTO: 90.2 FL (ref 79–97)
MCV RBC AUTO: 90.2 FL (ref 79–97)
MCV RBC AUTO: 90.6 FL (ref 79–97)
MCV RBC AUTO: 91.7 FL (ref 79–97)
MCV RBC AUTO: 92 FL (ref 79–97)
METHADONE UR QL SCN: NEGATIVE
MODALITY: ABNORMAL
MONOCYTES # BLD AUTO: 0.85 10*3/MM3 (ref 0.1–0.9)
MONOCYTES # BLD AUTO: 0.98 10*3/MM3 (ref 0.1–0.9)
MONOCYTES # BLD AUTO: 1.55 10*3/MM3 (ref 0.1–0.9)
MONOCYTES NFR BLD AUTO: 6.3 % (ref 5–12)
MONOCYTES NFR BLD AUTO: 6.9 % (ref 5–12)
MONOCYTES NFR BLD AUTO: 8.6 % (ref 5–12)
NEUTROPHILS NFR BLD AUTO: 10.03 10*3/MM3 (ref 1.7–7)
NEUTROPHILS NFR BLD AUTO: 11.21 10*3/MM3 (ref 1.7–7)
NEUTROPHILS NFR BLD AUTO: 13.75 10*3/MM3 (ref 1.7–7)
NEUTROPHILS NFR BLD AUTO: 70.1 % (ref 42.7–76)
NEUTROPHILS NFR BLD AUTO: 76.6 % (ref 42.7–76)
NEUTROPHILS NFR BLD AUTO: 83.2 % (ref 42.7–76)
NITRITE UR QL STRIP: NEGATIVE
NITRITE UR QL STRIP: POSITIVE
NOTIFIED WHO: ABNORMAL
NRBC BLD AUTO-RTO: 0 /100 WBC (ref 0–0.2)
NT-PROBNP SERPL-MCNC: ABNORMAL PG/ML (ref 0–900)
NT-PROBNP SERPL-MCNC: ABNORMAL PG/ML (ref 0–900)
O2 A-A PPRESDIFF RESPIRATORY: 0.3 MMHG
O2 A-A PPRESDIFF RESPIRATORY: 0.3 MMHG
O2 A-A PPRESDIFF RESPIRATORY: 0.4 MMHG
O2 A-A PPRESDIFF RESPIRATORY: 0.4 MMHG
O2 A-A PPRESDIFF RESPIRATORY: 0.5 MMHG
OPIATES UR QL: NEGATIVE
OSMOLALITY SERPL: 299 MOSM/KG (ref 275–300)
OXYCODONE UR QL SCN: NEGATIVE
PCO2 BLDA: 31.6 MM HG (ref 35–45)
PCO2 BLDA: 33.2 MM HG (ref 35–45)
PCO2 BLDA: 33.6 MM HG (ref 35–45)
PCO2 BLDA: 35.9 MM HG (ref 35–45)
PCO2 BLDA: 41.7 MM HG (ref 35–45)
PEEP RESPIRATORY: 5 CM[H2O]
PEEP RESPIRATORY: 7 CM[H2O]
PH BLDA: 7.38 PH UNITS (ref 7.35–7.45)
PH BLDA: 7.46 PH UNITS (ref 7.35–7.45)
PH BLDA: 7.46 PH UNITS (ref 7.35–7.45)
PH BLDA: 7.5 PH UNITS (ref 7.35–7.45)
PH BLDA: 7.54 PH UNITS (ref 7.35–7.45)
PH UR STRIP.AUTO: 6 [PH] (ref 5–8)
PH UR STRIP.AUTO: <=5 [PH] (ref 5–8)
PHOSPHATE SERPL-MCNC: 1.9 MG/DL (ref 2.5–4.5)
PHOSPHATE SERPL-MCNC: 2.6 MG/DL (ref 2.5–4.5)
PHOSPHATE SERPL-MCNC: 2.7 MG/DL (ref 2.5–4.5)
PHOSPHATE SERPL-MCNC: 2.9 MG/DL (ref 2.5–4.5)
PHOSPHATE SERPL-MCNC: 3 MG/DL (ref 2.5–4.5)
PHOSPHATE SERPL-MCNC: 3 MG/DL (ref 2.5–4.5)
PHOSPHATE SERPL-MCNC: 3.1 MG/DL (ref 2.5–4.5)
PHOSPHATE SERPL-MCNC: 3.1 MG/DL (ref 2.5–4.5)
PHOSPHATE SERPL-MCNC: 3.4 MG/DL (ref 2.5–4.5)
PHOSPHATE SERPL-MCNC: 3.6 MG/DL (ref 2.5–4.5)
PHOSPHATE SERPL-MCNC: 3.6 MG/DL (ref 2.5–4.5)
PHOSPHATE SERPL-MCNC: 3.9 MG/DL (ref 2.5–4.5)
PHOSPHATE SERPL-MCNC: 4.6 MG/DL (ref 2.5–4.5)
PLATELET # BLD AUTO: 304 10*3/MM3 (ref 140–450)
PLATELET # BLD AUTO: 316 10*3/MM3 (ref 140–450)
PLATELET # BLD AUTO: 359 10*3/MM3 (ref 140–450)
PLATELET # BLD AUTO: 361 10*3/MM3 (ref 140–450)
PLATELET # BLD AUTO: 362 10*3/MM3 (ref 140–450)
PLATELET # BLD AUTO: 363 10*3/MM3 (ref 140–450)
PLATELET # BLD AUTO: 395 10*3/MM3 (ref 140–450)
PLATELET # BLD AUTO: 397 10*3/MM3 (ref 140–450)
PLATELET # BLD AUTO: 442 10*3/MM3 (ref 140–450)
PLATELET # BLD AUTO: 464 10*3/MM3 (ref 140–450)
PLATELET # BLD AUTO: 465 10*3/MM3 (ref 140–450)
PLATELET # BLD AUTO: 471 10*3/MM3 (ref 140–450)
PLATELET # BLD AUTO: 486 10*3/MM3 (ref 140–450)
PLATELET # BLD AUTO: 491 10*3/MM3 (ref 140–450)
PLATELET # BLD AUTO: 554 10*3/MM3 (ref 140–450)
PMV BLD AUTO: 10.5 FL (ref 6–12)
PMV BLD AUTO: 10.5 FL (ref 6–12)
PMV BLD AUTO: 10.7 FL (ref 6–12)
PMV BLD AUTO: 10.8 FL (ref 6–12)
PMV BLD AUTO: 10.9 FL (ref 6–12)
PMV BLD AUTO: 10.9 FL (ref 6–12)
PMV BLD AUTO: 11 FL (ref 6–12)
PMV BLD AUTO: 11.1 FL (ref 6–12)
PMV BLD AUTO: 11.1 FL (ref 6–12)
PMV BLD AUTO: 11.2 FL (ref 6–12)
PMV BLD AUTO: 11.3 FL (ref 6–12)
PMV BLD AUTO: 11.4 FL (ref 6–12)
PMV BLD AUTO: 11.4 FL (ref 6–12)
PO2 BLD: 194 MM[HG] (ref 0–500)
PO2 BLD: 220 MM[HG] (ref 0–500)
PO2 BLD: 226 MM[HG] (ref 0–500)
PO2 BLD: 244 MM[HG] (ref 0–500)
PO2 BLD: 279 MM[HG] (ref 0–500)
PO2 BLDA: 66 MM HG (ref 80–100)
PO2 BLDA: 73.3 MM HG (ref 80–100)
PO2 BLDA: 77.4 MM HG (ref 80–100)
PO2 BLDA: 83.7 MM HG (ref 80–100)
PO2 BLDA: 90.4 MM HG (ref 80–100)
POTASSIUM SERPL-SCNC: 3.1 MMOL/L (ref 3.5–5.2)
POTASSIUM SERPL-SCNC: 3.3 MMOL/L (ref 3.5–5.2)
POTASSIUM SERPL-SCNC: 3.3 MMOL/L (ref 3.5–5.2)
POTASSIUM SERPL-SCNC: 3.5 MMOL/L (ref 3.5–5.2)
POTASSIUM SERPL-SCNC: 3.6 MMOL/L (ref 3.5–5.2)
POTASSIUM SERPL-SCNC: 3.7 MMOL/L (ref 3.5–5.2)
POTASSIUM SERPL-SCNC: 3.8 MMOL/L (ref 3.5–5.2)
POTASSIUM SERPL-SCNC: 3.9 MMOL/L (ref 3.5–5.2)
POTASSIUM SERPL-SCNC: 3.9 MMOL/L (ref 3.5–5.2)
POTASSIUM SERPL-SCNC: 4 MMOL/L (ref 3.5–5.2)
POTASSIUM SERPL-SCNC: 4.1 MMOL/L (ref 3.5–5.2)
POTASSIUM SERPL-SCNC: 4.3 MMOL/L (ref 3.5–5.2)
POTASSIUM SERPL-SCNC: 4.3 MMOL/L (ref 3.5–5.2)
POTASSIUM SERPL-SCNC: 4.9 MMOL/L (ref 3.5–5.2)
POTASSIUM SERPL-SCNC: 5.1 MMOL/L (ref 3.5–5.2)
PROCALCITONIN SERPL-MCNC: 0.13 NG/ML (ref 0–0.25)
PROCALCITONIN SERPL-MCNC: 0.4 NG/ML (ref 0–0.25)
PROCALCITONIN SERPL-MCNC: 0.41 NG/ML (ref 0–0.25)
PROT SERPL-MCNC: 6.7 G/DL (ref 6–8.5)
PROT SERPL-MCNC: 6.9 G/DL (ref 6–8.5)
PROT SERPL-MCNC: 7 G/DL (ref 6–8.5)
PROT SERPL-MCNC: 7.1 G/DL (ref 6–8.5)
PROT SERPL-MCNC: 7.4 G/DL (ref 6–8.5)
PROT SERPL-MCNC: 7.6 G/DL (ref 6–8.5)
PROT SERPL-MCNC: 8.2 G/DL (ref 6–8.5)
PROT SERPL-MCNC: 8.2 G/DL (ref 6–8.5)
PROT SERPL-MCNC: 8.6 G/DL (ref 6–8.5)
PROT UR QL STRIP: ABNORMAL
PROT UR QL STRIP: ABNORMAL
PROTHROMBIN TIME: 13.6 SECONDS (ref 11.7–14.2)
PSV: 8 CMH2O
QT INTERVAL: 324 MS
QT INTERVAL: 351 MS
QT INTERVAL: 360 MS
QTC INTERVAL: 441 MS
QTC INTERVAL: 486 MS
QTC INTERVAL: 505 MS
RBC # BLD AUTO: 3.74 10*6/MM3 (ref 3.77–5.28)
RBC # BLD AUTO: 3.81 10*6/MM3 (ref 3.77–5.28)
RBC # BLD AUTO: 3.98 10*6/MM3 (ref 3.77–5.28)
RBC # BLD AUTO: 4.06 10*6/MM3 (ref 3.77–5.28)
RBC # BLD AUTO: 4.07 10*6/MM3 (ref 3.77–5.28)
RBC # BLD AUTO: 4.09 10*6/MM3 (ref 3.77–5.28)
RBC # BLD AUTO: 4.09 10*6/MM3 (ref 3.77–5.28)
RBC # BLD AUTO: 4.14 10*6/MM3 (ref 3.77–5.28)
RBC # BLD AUTO: 4.16 10*6/MM3 (ref 3.77–5.28)
RBC # BLD AUTO: 4.23 10*6/MM3 (ref 3.77–5.28)
RBC # BLD AUTO: 4.24 10*6/MM3 (ref 3.77–5.28)
RBC # BLD AUTO: 4.31 10*6/MM3 (ref 3.77–5.28)
RBC # BLD AUTO: 4.96 10*6/MM3 (ref 3.77–5.28)
RBC # BLD AUTO: 5.15 10*6/MM3 (ref 3.77–5.28)
RBC # BLD AUTO: 5.39 10*6/MM3 (ref 3.77–5.28)
RBC # UR STRIP: ABNORMAL /HPF
RBC # UR STRIP: ABNORMAL /HPF
READ BACK: YES
REF LAB TEST METHOD: ABNORMAL
REF LAB TEST METHOD: ABNORMAL
RIGHT ARM BP: NORMAL MMHG
SAO2 % BLDCOA: 94 % (ref 92–98.5)
SAO2 % BLDCOA: 95 % (ref 92–98.5)
SAO2 % BLDCOA: 96 % (ref 92–98.5)
SAO2 % BLDCOA: 96.9 % (ref 92–98.5)
SAO2 % BLDCOA: 98 % (ref 92–98.5)
SET MECH RESP RATE: 10
SET MECH RESP RATE: 10
SET MECH RESP RATE: 14
SET MECH RESP RATE: 14
SINUS: 3 CM
SODIUM SERPL-SCNC: 125 MMOL/L (ref 136–145)
SODIUM SERPL-SCNC: 129 MMOL/L (ref 136–145)
SODIUM SERPL-SCNC: 135 MMOL/L (ref 136–145)
SODIUM SERPL-SCNC: 136 MMOL/L (ref 136–145)
SODIUM SERPL-SCNC: 136 MMOL/L (ref 136–145)
SODIUM SERPL-SCNC: 138 MMOL/L (ref 136–145)
SODIUM SERPL-SCNC: 138 MMOL/L (ref 136–145)
SODIUM SERPL-SCNC: 139 MMOL/L (ref 136–145)
SODIUM SERPL-SCNC: 141 MMOL/L (ref 136–145)
SODIUM SERPL-SCNC: 144 MMOL/L (ref 136–145)
SODIUM SERPL-SCNC: 145 MMOL/L (ref 136–145)
SODIUM SERPL-SCNC: 145 MMOL/L (ref 136–145)
SODIUM SERPL-SCNC: 147 MMOL/L (ref 136–145)
SODIUM SERPL-SCNC: 148 MMOL/L (ref 136–145)
SODIUM SERPL-SCNC: 150 MMOL/L (ref 136–145)
SODIUM SERPL-SCNC: 152 MMOL/L (ref 136–145)
SODIUM UR-SCNC: 35 MMOL/L
SP GR UR STRIP: 1.01 (ref 1–1.03)
SP GR UR STRIP: >1.03 (ref 1–1.03)
SQUAMOUS #/AREA URNS HPF: ABNORMAL /HPF
SQUAMOUS #/AREA URNS HPF: ABNORMAL /HPF
STJ: 1.81 CM
TOTAL RATE: 14 BREATHS/MINUTE
TOTAL RATE: 14 BREATHS/MINUTE
TOTAL RATE: 19 BREATHS/MINUTE
TOTAL RATE: 21 BREATHS/MINUTE
TOTAL RATE: 23 BREATHS/MINUTE
TRIGL SERPL-MCNC: 311 MG/DL (ref 0–150)
TROPONIN T DELTA: ABNORMAL
TROPONIN T SERPL HS-MCNC: 3432 NG/L
TSH SERPL DL<=0.05 MIU/L-ACNC: 0.35 UIU/ML (ref 0.27–4.2)
URATE SERPL-MCNC: 7.7 MG/DL (ref 2.4–5.7)
UROBILINOGEN UR QL STRIP: ABNORMAL
UROBILINOGEN UR QL STRIP: ABNORMAL
VENTILATOR MODE: ABNORMAL
VENTILATOR MODE: AC
VLDLC SERPL-MCNC: 58 MG/DL (ref 5–40)
VT ON VENT VENT: 454 ML
VT ON VENT VENT: 500 ML
WBC # UR STRIP: ABNORMAL /HPF
WBC # UR STRIP: ABNORMAL /HPF
WBC NRBC COR # BLD AUTO: 10.98 10*3/MM3 (ref 3.4–10.8)
WBC NRBC COR # BLD AUTO: 11.13 10*3/MM3 (ref 3.4–10.8)
WBC NRBC COR # BLD AUTO: 12.28 10*3/MM3 (ref 3.4–10.8)
WBC NRBC COR # BLD AUTO: 13.13 10*3/MM3 (ref 3.4–10.8)
WBC NRBC COR # BLD AUTO: 13.33 10*3/MM3 (ref 3.4–10.8)
WBC NRBC COR # BLD AUTO: 13.48 10*3/MM3 (ref 3.4–10.8)
WBC NRBC COR # BLD AUTO: 13.6 10*3/MM3 (ref 3.4–10.8)
WBC NRBC COR # BLD AUTO: 14.22 10*3/MM3 (ref 3.4–10.8)
WBC NRBC COR # BLD AUTO: 14.24 10*3/MM3 (ref 3.4–10.8)
WBC NRBC COR # BLD AUTO: 14.3 10*3/MM3 (ref 3.4–10.8)
WBC NRBC COR # BLD AUTO: 14.6 10*3/MM3 (ref 3.4–10.8)
WBC NRBC COR # BLD AUTO: 15.34 10*3/MM3 (ref 3.4–10.8)
WBC NRBC COR # BLD AUTO: 17.03 10*3/MM3 (ref 3.4–10.8)
WBC NRBC COR # BLD AUTO: 17.94 10*3/MM3 (ref 3.4–10.8)
WBC NRBC COR # BLD AUTO: 18.44 10*3/MM3 (ref 3.4–10.8)

## 2024-01-01 PROCEDURE — 99232 SBSQ HOSP IP/OBS MODERATE 35: CPT | Performed by: SURGERY

## 2024-01-01 PROCEDURE — 80048 BASIC METABOLIC PNL TOTAL CA: CPT | Performed by: INTERNAL MEDICINE

## 2024-01-01 PROCEDURE — 80307 DRUG TEST PRSMV CHEM ANLYZR: CPT | Performed by: INTERNAL MEDICINE

## 2024-01-01 PROCEDURE — 93010 ELECTROCARDIOGRAM REPORT: CPT | Performed by: INTERNAL MEDICINE

## 2024-01-01 PROCEDURE — 94664 DEMO&/EVAL PT USE INHALER: CPT

## 2024-01-01 PROCEDURE — 85025 COMPLETE CBC W/AUTO DIFF WBC: CPT | Performed by: NURSE PRACTITIONER

## 2024-01-01 PROCEDURE — 97166 OT EVAL MOD COMPLEX 45 MIN: CPT

## 2024-01-01 PROCEDURE — 80069 RENAL FUNCTION PANEL: CPT | Performed by: INTERNAL MEDICINE

## 2024-01-01 PROCEDURE — C1757 CATH, THROMBECTOMY/EMBOLECT: HCPCS | Performed by: RADIOLOGY

## 2024-01-01 PROCEDURE — 85027 COMPLETE CBC AUTOMATED: CPT | Performed by: SURGERY

## 2024-01-01 PROCEDURE — 94003 VENT MGMT INPAT SUBQ DAY: CPT

## 2024-01-01 PROCEDURE — C1769 GUIDE WIRE: HCPCS | Performed by: RADIOLOGY

## 2024-01-01 PROCEDURE — 83735 ASSAY OF MAGNESIUM: CPT | Performed by: STUDENT IN AN ORGANIZED HEALTH CARE EDUCATION/TRAINING PROGRAM

## 2024-01-01 PROCEDURE — 85730 THROMBOPLASTIN TIME PARTIAL: CPT

## 2024-01-01 PROCEDURE — 63710000001 INSULIN REGULAR HUMAN PER 5 UNITS: Performed by: INTERNAL MEDICINE

## 2024-01-01 PROCEDURE — 84132 ASSAY OF SERUM POTASSIUM: CPT | Performed by: INTERNAL MEDICINE

## 2024-01-01 PROCEDURE — 92941 PRQ TRLML REVSC TOT OCCL AMI: CPT | Performed by: INTERNAL MEDICINE

## 2024-01-01 PROCEDURE — 25810000003 SODIUM CHLORIDE 0.9 % SOLUTION: Performed by: INTERNAL MEDICINE

## 2024-01-01 PROCEDURE — 70450 CT HEAD/BRAIN W/O DYE: CPT

## 2024-01-01 PROCEDURE — 82803 BLOOD GASES ANY COMBINATION: CPT

## 2024-01-01 PROCEDURE — 93880 EXTRACRANIAL BILAT STUDY: CPT

## 2024-01-01 PROCEDURE — 82948 REAGENT STRIP/BLOOD GLUCOSE: CPT

## 2024-01-01 PROCEDURE — 99232 SBSQ HOSP IP/OBS MODERATE 35: CPT | Performed by: STUDENT IN AN ORGANIZED HEALTH CARE EDUCATION/TRAINING PROGRAM

## 2024-01-01 PROCEDURE — 25010000002 ONDANSETRON PER 1 MG: Performed by: INTERNAL MEDICINE

## 2024-01-01 PROCEDURE — 94799 UNLISTED PULMONARY SVC/PX: CPT

## 2024-01-01 PROCEDURE — 85027 COMPLETE CBC AUTOMATED: CPT | Performed by: INTERNAL MEDICINE

## 2024-01-01 PROCEDURE — 83605 ASSAY OF LACTIC ACID: CPT | Performed by: INTERNAL MEDICINE

## 2024-01-01 PROCEDURE — 99232 SBSQ HOSP IP/OBS MODERATE 35: CPT | Performed by: INTERNAL MEDICINE

## 2024-01-01 PROCEDURE — 99232 SBSQ HOSP IP/OBS MODERATE 35: CPT | Performed by: NURSE PRACTITIONER

## 2024-01-01 PROCEDURE — 25010000002 LIDOCAINE 2% SOLUTION: Performed by: INTERNAL MEDICINE

## 2024-01-01 PROCEDURE — 25010000002 PROPOFOL 10 MG/ML EMULSION: Performed by: ANESTHESIOLOGY

## 2024-01-01 PROCEDURE — 25010000002 ENOXAPARIN PER 10 MG: Performed by: INTERNAL MEDICINE

## 2024-01-01 PROCEDURE — 87205 SMEAR GRAM STAIN: CPT | Performed by: INTERNAL MEDICINE

## 2024-01-01 PROCEDURE — 4A023N7 MEASUREMENT OF CARDIAC SAMPLING AND PRESSURE, LEFT HEART, PERCUTANEOUS APPROACH: ICD-10-PCS | Performed by: INTERNAL MEDICINE

## 2024-01-01 PROCEDURE — 93308 TTE F-UP OR LMTD: CPT

## 2024-01-01 PROCEDURE — 71045 X-RAY EXAM CHEST 1 VIEW: CPT

## 2024-01-01 PROCEDURE — C1769 GUIDE WIRE: HCPCS | Performed by: INTERNAL MEDICINE

## 2024-01-01 PROCEDURE — 84300 ASSAY OF URINE SODIUM: CPT | Performed by: INTERNAL MEDICINE

## 2024-01-01 PROCEDURE — 84100 ASSAY OF PHOSPHORUS: CPT | Performed by: INTERNAL MEDICINE

## 2024-01-01 PROCEDURE — 94761 N-INVAS EAR/PLS OXIMETRY MLT: CPT

## 2024-01-01 PROCEDURE — 87088 URINE BACTERIA CULTURE: CPT | Performed by: STUDENT IN AN ORGANIZED HEALTH CARE EDUCATION/TRAINING PROGRAM

## 2024-01-01 PROCEDURE — 80053 COMPREHEN METABOLIC PANEL: CPT | Performed by: INTERNAL MEDICINE

## 2024-01-01 PROCEDURE — 93306 TTE W/DOPPLER COMPLETE: CPT

## 2024-01-01 PROCEDURE — 84550 ASSAY OF BLOOD/URIC ACID: CPT | Performed by: INTERNAL MEDICINE

## 2024-01-01 PROCEDURE — 80076 HEPATIC FUNCTION PANEL: CPT | Performed by: STUDENT IN AN ORGANIZED HEALTH CARE EDUCATION/TRAINING PROGRAM

## 2024-01-01 PROCEDURE — 99222 1ST HOSP IP/OBS MODERATE 55: CPT | Performed by: SURGERY

## 2024-01-01 PROCEDURE — 25010000002 MILRINONE LACTATE IN DEXTROSE 20-5 MG/100ML-% SOLUTION: Performed by: INTERNAL MEDICINE

## 2024-01-01 PROCEDURE — 93005 ELECTROCARDIOGRAM TRACING: CPT | Performed by: EMERGENCY MEDICINE

## 2024-01-01 PROCEDURE — 82248 BILIRUBIN DIRECT: CPT | Performed by: INTERNAL MEDICINE

## 2024-01-01 PROCEDURE — 93308 TTE F-UP OR LMTD: CPT | Performed by: INTERNAL MEDICINE

## 2024-01-01 PROCEDURE — 63710000001 INSULIN GLARGINE PER 5 UNITS: Performed by: INTERNAL MEDICINE

## 2024-01-01 PROCEDURE — 99232 SBSQ HOSP IP/OBS MODERATE 35: CPT | Performed by: PHYSICIAN ASSISTANT

## 2024-01-01 PROCEDURE — 74018 RADEX ABDOMEN 1 VIEW: CPT

## 2024-01-01 PROCEDURE — 25010000002 HEPARIN (PORCINE) PER 1000 UNITS: Performed by: INTERNAL MEDICINE

## 2024-01-01 PROCEDURE — 93306 TTE W/DOPPLER COMPLETE: CPT | Performed by: INTERNAL MEDICINE

## 2024-01-01 PROCEDURE — 94760 N-INVAS EAR/PLS OXIMETRY 1: CPT

## 2024-01-01 PROCEDURE — 25510000001 PERFLUTREN 6.52 MG/ML SUSPENSION 2 ML VIAL: Performed by: RADIOLOGY

## 2024-01-01 PROCEDURE — 99231 SBSQ HOSP IP/OBS SF/LOW 25: CPT | Performed by: SURGERY

## 2024-01-01 PROCEDURE — 99233 SBSQ HOSP IP/OBS HIGH 50: CPT | Performed by: STUDENT IN AN ORGANIZED HEALTH CARE EDUCATION/TRAINING PROGRAM

## 2024-01-01 PROCEDURE — 93325 DOPPLER ECHO COLOR FLOW MAPG: CPT

## 2024-01-01 PROCEDURE — 84443 ASSAY THYROID STIM HORMONE: CPT | Performed by: STUDENT IN AN ORGANIZED HEALTH CARE EDUCATION/TRAINING PROGRAM

## 2024-01-01 PROCEDURE — 63710000001 INSULIN REGULAR HUMAN PER 5 UNITS: Performed by: SURGERY

## 2024-01-01 PROCEDURE — 80069 RENAL FUNCTION PANEL: CPT | Performed by: SURGERY

## 2024-01-01 PROCEDURE — 93325 DOPPLER ECHO COLOR FLOW MAPG: CPT | Performed by: INTERNAL MEDICINE

## 2024-01-01 PROCEDURE — 85025 COMPLETE CBC W/AUTO DIFF WBC: CPT | Performed by: INTERNAL MEDICINE

## 2024-01-01 PROCEDURE — 81001 URINALYSIS AUTO W/SCOPE: CPT | Performed by: STUDENT IN AN ORGANIZED HEALTH CARE EDUCATION/TRAINING PROGRAM

## 2024-01-01 PROCEDURE — 25010000002 FUROSEMIDE PER 20 MG: Performed by: INTERNAL MEDICINE

## 2024-01-01 PROCEDURE — 63710000001 INSULIN GLARGINE PER 5 UNITS: Performed by: STUDENT IN AN ORGANIZED HEALTH CARE EDUCATION/TRAINING PROGRAM

## 2024-01-01 PROCEDURE — C1725 CATH, TRANSLUMIN NON-LASER: HCPCS | Performed by: INTERNAL MEDICINE

## 2024-01-01 PROCEDURE — 83735 ASSAY OF MAGNESIUM: CPT | Performed by: INTERNAL MEDICINE

## 2024-01-01 PROCEDURE — 84100 ASSAY OF PHOSPHORUS: CPT | Performed by: SURGERY

## 2024-01-01 PROCEDURE — C9606 PERC D-E COR REVASC W AMI S: HCPCS | Performed by: INTERNAL MEDICINE

## 2024-01-01 PROCEDURE — 0 IODIXANOL PER 1 ML: Performed by: RADIOLOGY

## 2024-01-01 PROCEDURE — 82803 BLOOD GASES ANY COMBINATION: CPT | Performed by: RADIOLOGY

## 2024-01-01 PROCEDURE — 36600 WITHDRAWAL OF ARTERIAL BLOOD: CPT

## 2024-01-01 PROCEDURE — C1894 INTRO/SHEATH, NON-LASER: HCPCS | Performed by: INTERNAL MEDICINE

## 2024-01-01 PROCEDURE — 80048 BASIC METABOLIC PNL TOTAL CA: CPT | Performed by: RADIOLOGY

## 2024-01-01 PROCEDURE — C1760 CLOSURE DEV, VASC: HCPCS | Performed by: INTERNAL MEDICINE

## 2024-01-01 PROCEDURE — 25010000002 HEPARIN (PORCINE) PER 1000 UNITS: Performed by: RADIOLOGY

## 2024-01-01 PROCEDURE — 97530 THERAPEUTIC ACTIVITIES: CPT

## 2024-01-01 PROCEDURE — 03CG3ZZ EXTIRPATION OF MATTER FROM INTRACRANIAL ARTERY, PERCUTANEOUS APPROACH: ICD-10-PCS | Performed by: RADIOLOGY

## 2024-01-01 PROCEDURE — 84132 ASSAY OF SERUM POTASSIUM: CPT

## 2024-01-01 PROCEDURE — C1887 CATHETER, GUIDING: HCPCS | Performed by: RADIOLOGY

## 2024-01-01 PROCEDURE — 25010000002 CEFTRIAXONE PER 250 MG: Performed by: SURGERY

## 2024-01-01 PROCEDURE — 25010000002 ENOXAPARIN PER 10 MG: Performed by: STUDENT IN AN ORGANIZED HEALTH CARE EDUCATION/TRAINING PROGRAM

## 2024-01-01 PROCEDURE — 3E0G76Z INTRODUCTION OF NUTRITIONAL SUBSTANCE INTO UPPER GI, VIA NATURAL OR ARTIFICIAL OPENING: ICD-10-PCS | Performed by: INTERNAL MEDICINE

## 2024-01-01 PROCEDURE — 84484 ASSAY OF TROPONIN QUANT: CPT | Performed by: EMERGENCY MEDICINE

## 2024-01-01 PROCEDURE — 5A1945Z RESPIRATORY VENTILATION, 24-96 CONSECUTIVE HOURS: ICD-10-PCS | Performed by: INTERNAL MEDICINE

## 2024-01-01 PROCEDURE — 85730 THROMBOPLASTIN TIME PARTIAL: CPT | Performed by: EMERGENCY MEDICINE

## 2024-01-01 PROCEDURE — 99285 EMERGENCY DEPT VISIT HI MDM: CPT

## 2024-01-01 PROCEDURE — 99221 1ST HOSP IP/OBS SF/LOW 40: CPT | Performed by: SURGERY

## 2024-01-01 PROCEDURE — 43246 EGD PLACE GASTROSTOMY TUBE: CPT | Performed by: SURGERY

## 2024-01-01 PROCEDURE — 84145 PROCALCITONIN (PCT): CPT

## 2024-01-01 PROCEDURE — 25010000002 LIDOCAINE 1 % SOLUTION: Performed by: SURGERY

## 2024-01-01 PROCEDURE — 027034Z DILATION OF CORONARY ARTERY, ONE ARTERY WITH DRUG-ELUTING INTRALUMINAL DEVICE, PERCUTANEOUS APPROACH: ICD-10-PCS | Performed by: INTERNAL MEDICINE

## 2024-01-01 PROCEDURE — 80053 COMPREHEN METABOLIC PANEL: CPT | Performed by: EMERGENCY MEDICINE

## 2024-01-01 PROCEDURE — 85027 COMPLETE CBC AUTOMATED: CPT | Performed by: RADIOLOGY

## 2024-01-01 PROCEDURE — 94002 VENT MGMT INPAT INIT DAY: CPT

## 2024-01-01 PROCEDURE — C1760 CLOSURE DEV, VASC: HCPCS | Performed by: RADIOLOGY

## 2024-01-01 PROCEDURE — 85027 COMPLETE CBC AUTOMATED: CPT

## 2024-01-01 PROCEDURE — 94640 AIRWAY INHALATION TREATMENT: CPT

## 2024-01-01 PROCEDURE — 25010000002 NALOXONE PER 1 MG: Performed by: INTERNAL MEDICINE

## 2024-01-01 PROCEDURE — 97162 PT EVAL MOD COMPLEX 30 MIN: CPT

## 2024-01-01 PROCEDURE — 25010000002 PROPOFOL 10 MG/ML EMULSION

## 2024-01-01 PROCEDURE — 87185 SC STD ENZYME DETCJ PER NZM: CPT | Performed by: INTERNAL MEDICINE

## 2024-01-01 PROCEDURE — 25010000002 POTASSIUM CHLORIDE 10 MEQ/100ML SOLUTION

## 2024-01-01 PROCEDURE — 99498 ADVNCD CARE PLAN ADDL 30 MIN: CPT | Performed by: NURSE PRACTITIONER

## 2024-01-01 PROCEDURE — C1894 INTRO/SHEATH, NON-LASER: HCPCS | Performed by: RADIOLOGY

## 2024-01-01 PROCEDURE — 83880 ASSAY OF NATRIURETIC PEPTIDE: CPT | Performed by: STUDENT IN AN ORGANIZED HEALTH CARE EDUCATION/TRAINING PROGRAM

## 2024-01-01 PROCEDURE — 84145 PROCALCITONIN (PCT): CPT | Performed by: STUDENT IN AN ORGANIZED HEALTH CARE EDUCATION/TRAINING PROGRAM

## 2024-01-01 PROCEDURE — 25010000002 MILRINONE LACTATE IN DEXTROSE 20-5 MG/100ML-% SOLUTION: Performed by: NURSE PRACTITIONER

## 2024-01-01 PROCEDURE — 82010 KETONE BODYS QUAN: CPT

## 2024-01-01 PROCEDURE — 87077 CULTURE AEROBIC IDENTIFY: CPT | Performed by: INTERNAL MEDICINE

## 2024-01-01 PROCEDURE — 36600 WITHDRAWAL OF ARTERIAL BLOOD: CPT | Performed by: RADIOLOGY

## 2024-01-01 PROCEDURE — 25010000002 PROPOFOL 10 MG/ML EMULSION: Performed by: INTERNAL MEDICINE

## 2024-01-01 PROCEDURE — 25010000002 PHENYLEPHRINE 10 MG/ML SOLUTION: Performed by: ANESTHESIOLOGY

## 2024-01-01 PROCEDURE — 85610 PROTHROMBIN TIME: CPT | Performed by: EMERGENCY MEDICINE

## 2024-01-01 PROCEDURE — 93459 L HRT ART/GRFT ANGIO: CPT | Performed by: INTERNAL MEDICINE

## 2024-01-01 PROCEDURE — 87040 BLOOD CULTURE FOR BACTERIA: CPT | Performed by: STUDENT IN AN ORGANIZED HEALTH CARE EDUCATION/TRAINING PROGRAM

## 2024-01-01 PROCEDURE — 25510000001 IOPAMIDOL PER 1 ML: Performed by: INTERNAL MEDICINE

## 2024-01-01 PROCEDURE — 93880 EXTRACRANIAL BILAT STUDY: CPT | Performed by: SURGERY

## 2024-01-01 PROCEDURE — 25010000002 MIDAZOLAM PER 1 MG: Performed by: INTERNAL MEDICINE

## 2024-01-01 PROCEDURE — 25010000002 FENTANYL CITRATE (PF) 50 MCG/ML SOLUTION: Performed by: INTERNAL MEDICINE

## 2024-01-01 PROCEDURE — 83735 ASSAY OF MAGNESIUM: CPT | Performed by: SURGERY

## 2024-01-01 PROCEDURE — 85730 THROMBOPLASTIN TIME PARTIAL: CPT | Performed by: STUDENT IN AN ORGANIZED HEALTH CARE EDUCATION/TRAINING PROGRAM

## 2024-01-01 PROCEDURE — 25010000002 CEFTRIAXONE PER 250 MG: Performed by: STUDENT IN AN ORGANIZED HEALTH CARE EDUCATION/TRAINING PROGRAM

## 2024-01-01 PROCEDURE — 84145 PROCALCITONIN (PCT): CPT | Performed by: INTERNAL MEDICINE

## 2024-01-01 PROCEDURE — B2111ZZ FLUOROSCOPY OF MULTIPLE CORONARY ARTERIES USING LOW OSMOLAR CONTRAST: ICD-10-PCS | Performed by: INTERNAL MEDICINE

## 2024-01-01 PROCEDURE — 25010000002 TENECTEPLASE PER 50 MG: Performed by: STUDENT IN AN ORGANIZED HEALTH CARE EDUCATION/TRAINING PROGRAM

## 2024-01-01 PROCEDURE — 82947 ASSAY GLUCOSE BLOOD QUANT: CPT

## 2024-01-01 PROCEDURE — B31RYZZ FLUOROSCOPY OF INTRACRANIAL ARTERIES USING OTHER CONTRAST: ICD-10-PCS | Performed by: RADIOLOGY

## 2024-01-01 PROCEDURE — 70551 MRI BRAIN STEM W/O DYE: CPT

## 2024-01-01 PROCEDURE — 61645 PERQ ART M-THROMBECT &/NFS: CPT | Performed by: RADIOLOGY

## 2024-01-01 PROCEDURE — 99253 IP/OBS CNSLTJ NEW/EST LOW 45: CPT | Performed by: NURSE PRACTITIONER

## 2024-01-01 PROCEDURE — 25010000002 LIDOCAINE 2% SOLUTION: Performed by: ANESTHESIOLOGY

## 2024-01-01 PROCEDURE — B2151ZZ FLUOROSCOPY OF LEFT HEART USING LOW OSMOLAR CONTRAST: ICD-10-PCS | Performed by: INTERNAL MEDICINE

## 2024-01-01 PROCEDURE — 93005 ELECTROCARDIOGRAM TRACING: CPT | Performed by: INTERNAL MEDICINE

## 2024-01-01 PROCEDURE — 63710000001 INSULIN REGULAR HUMAN PER 5 UNITS: Performed by: STUDENT IN AN ORGANIZED HEALTH CARE EDUCATION/TRAINING PROGRAM

## 2024-01-01 PROCEDURE — 25810000003 SODIUM CHLORIDE 0.9 % SOLUTION: Performed by: ANESTHESIOLOGY

## 2024-01-01 PROCEDURE — 25010000002 PIPERACILLIN SOD-TAZOBACTAM PER 1 G: Performed by: INTERNAL MEDICINE

## 2024-01-01 PROCEDURE — C1874 STENT, COATED/COV W/DEL SYS: HCPCS | Performed by: INTERNAL MEDICINE

## 2024-01-01 PROCEDURE — 87070 CULTURE OTHR SPECIMN AEROBIC: CPT | Performed by: INTERNAL MEDICINE

## 2024-01-01 PROCEDURE — 83036 HEMOGLOBIN GLYCOSYLATED A1C: CPT | Performed by: RADIOLOGY

## 2024-01-01 PROCEDURE — 85730 THROMBOPLASTIN TIME PARTIAL: CPT | Performed by: INTERNAL MEDICINE

## 2024-01-01 PROCEDURE — B2181ZZ FLUOROSCOPY OF LEFT INTERNAL MAMMARY BYPASS GRAFT USING LOW OSMOLAR CONTRAST: ICD-10-PCS | Performed by: INTERNAL MEDICINE

## 2024-01-01 PROCEDURE — 87186 SC STD MICRODIL/AGAR DIL: CPT | Performed by: STUDENT IN AN ORGANIZED HEALTH CARE EDUCATION/TRAINING PROGRAM

## 2024-01-01 PROCEDURE — 87086 URINE CULTURE/COLONY COUNT: CPT | Performed by: STUDENT IN AN ORGANIZED HEALTH CARE EDUCATION/TRAINING PROGRAM

## 2024-01-01 PROCEDURE — 80048 BASIC METABOLIC PNL TOTAL CA: CPT | Performed by: SURGERY

## 2024-01-01 PROCEDURE — 87040 BLOOD CULTURE FOR BACTERIA: CPT | Performed by: INTERNAL MEDICINE

## 2024-01-01 PROCEDURE — 99254 IP/OBS CNSLTJ NEW/EST MOD 60: CPT

## 2024-01-01 PROCEDURE — 80053 COMPREHEN METABOLIC PANEL: CPT | Performed by: NURSE PRACTITIONER

## 2024-01-01 PROCEDURE — 25010000002 FENTANYL CITRATE (PF) 50 MCG/ML SOLUTION

## 2024-01-01 PROCEDURE — C1887 CATHETER, GUIDING: HCPCS | Performed by: INTERNAL MEDICINE

## 2024-01-01 PROCEDURE — 80061 LIPID PANEL: CPT | Performed by: RADIOLOGY

## 2024-01-01 PROCEDURE — 80053 COMPREHEN METABOLIC PANEL: CPT | Performed by: RADIOLOGY

## 2024-01-01 PROCEDURE — 3E03317 INTRODUCTION OF OTHER THROMBOLYTIC INTO PERIPHERAL VEIN, PERCUTANEOUS APPROACH: ICD-10-PCS | Performed by: STUDENT IN AN ORGANIZED HEALTH CARE EDUCATION/TRAINING PROGRAM

## 2024-01-01 PROCEDURE — 81001 URINALYSIS AUTO W/SCOPE: CPT

## 2024-01-01 PROCEDURE — 82570 ASSAY OF URINE CREATININE: CPT | Performed by: INTERNAL MEDICINE

## 2024-01-01 PROCEDURE — 85025 COMPLETE CBC W/AUTO DIFF WBC: CPT | Performed by: EMERGENCY MEDICINE

## 2024-01-01 PROCEDURE — 83930 ASSAY OF BLOOD OSMOLALITY: CPT

## 2024-01-01 PROCEDURE — 99497 ADVNCD CARE PLAN 30 MIN: CPT | Performed by: NURSE PRACTITIONER

## 2024-01-01 PROCEDURE — 80076 HEPATIC FUNCTION PANEL: CPT | Performed by: INTERNAL MEDICINE

## 2024-01-01 PROCEDURE — 99223 1ST HOSP IP/OBS HIGH 75: CPT | Performed by: INTERNAL MEDICINE

## 2024-01-01 PROCEDURE — 0DH63UZ INSERTION OF FEEDING DEVICE INTO STOMACH, PERCUTANEOUS APPROACH: ICD-10-PCS | Performed by: SURGERY

## 2024-01-01 PROCEDURE — 25510000001 PERFLUTREN 6.52 MG/ML SUSPENSION 2 ML VIAL: Performed by: INTERNAL MEDICINE

## 2024-01-01 PROCEDURE — 84484 ASSAY OF TROPONIN QUANT: CPT | Performed by: RADIOLOGY

## 2024-01-01 PROCEDURE — 25010000002 LORAZEPAM PER 2 MG

## 2024-01-01 PROCEDURE — 83880 ASSAY OF NATRIURETIC PEPTIDE: CPT | Performed by: EMERGENCY MEDICINE

## 2024-01-01 PROCEDURE — 85018 HEMOGLOBIN: CPT

## 2024-01-01 DEVICE — XIENCE SKYPOINT™ EVEROLIMUS ELUTING CORONARY STENT SYSTEM 2.25 MM X 15 MM / RAPID-EXCHANGE
Type: IMPLANTABLE DEVICE | Site: CORONARY | Status: FUNCTIONAL
Brand: XIENCE SKYPOINT™

## 2024-01-01 RX ORDER — PHENYLEPHRINE HYDROCHLORIDE 10 MG/ML
INJECTION INTRAVENOUS AS NEEDED
Status: DISCONTINUED | OUTPATIENT
Start: 2024-01-01 | End: 2024-01-01 | Stop reason: SURG

## 2024-01-01 RX ORDER — PROPOFOL 10 MG/ML
VIAL (ML) INTRAVENOUS AS NEEDED
Status: DISCONTINUED | OUTPATIENT
Start: 2024-01-01 | End: 2024-01-01 | Stop reason: SURG

## 2024-01-01 RX ORDER — IBUPROFEN 600 MG/1
1 TABLET ORAL
Status: DISCONTINUED | OUTPATIENT
Start: 2024-01-01 | End: 2024-01-01 | Stop reason: ALTCHOICE

## 2024-01-01 RX ORDER — IPRATROPIUM BROMIDE AND ALBUTEROL SULFATE 2.5; .5 MG/3ML; MG/3ML
3 SOLUTION RESPIRATORY (INHALATION) EVERY 4 HOURS PRN
Status: DISCONTINUED | OUTPATIENT
Start: 2024-01-01 | End: 2024-01-01 | Stop reason: HOSPADM

## 2024-01-01 RX ORDER — SODIUM CHLORIDE 9 MG/ML
1000 INJECTION, SOLUTION INTRAVENOUS CONTINUOUS
Status: ACTIVE | OUTPATIENT
Start: 2024-01-01 | End: 2024-01-01

## 2024-01-01 RX ORDER — IOPAMIDOL 755 MG/ML
INJECTION, SOLUTION INTRAVASCULAR
Status: DISCONTINUED | OUTPATIENT
Start: 2024-01-01 | End: 2024-01-01 | Stop reason: HOSPADM

## 2024-01-01 RX ORDER — ASPIRIN 81 MG/1
81 TABLET, CHEWABLE ORAL DAILY
Status: DISCONTINUED | OUTPATIENT
Start: 2024-01-01 | End: 2024-01-01 | Stop reason: HOSPADM

## 2024-01-01 RX ORDER — FENTANYL CITRATE 50 UG/ML
50 INJECTION, SOLUTION INTRAMUSCULAR; INTRAVENOUS
Status: DISCONTINUED | OUTPATIENT
Start: 2024-01-01 | End: 2024-01-01

## 2024-01-01 RX ORDER — FUROSEMIDE 10 MG/ML
100 INJECTION INTRAMUSCULAR; INTRAVENOUS ONCE
Status: COMPLETED | OUTPATIENT
Start: 2024-01-01 | End: 2024-01-01

## 2024-01-01 RX ORDER — FENTANYL CITRATE 50 UG/ML
100 INJECTION, SOLUTION INTRAMUSCULAR; INTRAVENOUS
Status: DISCONTINUED | OUTPATIENT
Start: 2024-01-01 | End: 2024-01-01

## 2024-01-01 RX ORDER — ACETAMINOPHEN 325 MG/1
650 TABLET ORAL EVERY 4 HOURS PRN
Status: DISCONTINUED | OUTPATIENT
Start: 2024-01-01 | End: 2024-01-01 | Stop reason: HOSPADM

## 2024-01-01 RX ORDER — AMOXICILLIN AND CLAVULANATE POTASSIUM 500; 125 MG/1; MG/1
500 TABLET, FILM COATED ORAL EVERY 12 HOURS SCHEDULED
Status: COMPLETED | OUTPATIENT
Start: 2024-01-01 | End: 2024-01-01

## 2024-01-01 RX ORDER — FUROSEMIDE 10 MG/ML
60 INJECTION INTRAMUSCULAR; INTRAVENOUS EVERY 8 HOURS
Status: DISCONTINUED | OUTPATIENT
Start: 2024-01-01 | End: 2024-01-01

## 2024-01-01 RX ORDER — MIDAZOLAM HYDROCHLORIDE 1 MG/ML
INJECTION, SOLUTION INTRAMUSCULAR; INTRAVENOUS
Status: DISCONTINUED | OUTPATIENT
Start: 2024-01-01 | End: 2024-01-01 | Stop reason: HOSPADM

## 2024-01-01 RX ORDER — SODIUM CHLORIDE 0.9 % (FLUSH) 0.9 %
10 SYRINGE (ML) INJECTION EVERY 12 HOURS SCHEDULED
Status: DISCONTINUED | OUTPATIENT
Start: 2024-01-01 | End: 2024-01-01

## 2024-01-01 RX ORDER — PROMETHAZINE HYDROCHLORIDE 25 MG/1
25 SUPPOSITORY RECTAL ONCE AS NEEDED
Status: DISCONTINUED | OUTPATIENT
Start: 2024-01-01 | End: 2024-01-01 | Stop reason: HOSPADM

## 2024-01-01 RX ORDER — NICOTINE POLACRILEX 4 MG
15 LOZENGE BUCCAL
Status: DISCONTINUED | OUTPATIENT
Start: 2024-01-01 | End: 2024-01-01

## 2024-01-01 RX ORDER — ACETAMINOPHEN 325 MG/1
650 TABLET ORAL EVERY 4 HOURS PRN
Status: DISCONTINUED | OUTPATIENT
Start: 2024-01-01 | End: 2024-01-01

## 2024-01-01 RX ORDER — DEXTROSE MONOHYDRATE 25 G/50ML
10-50 INJECTION, SOLUTION INTRAVENOUS
Status: DISCONTINUED | OUTPATIENT
Start: 2024-01-01 | End: 2024-01-01 | Stop reason: ALTCHOICE

## 2024-01-01 RX ORDER — POTASSIUM CHLORIDE 750 MG/1
40 TABLET, FILM COATED, EXTENDED RELEASE ORAL EVERY 4 HOURS
Status: COMPLETED | OUTPATIENT
Start: 2024-01-01 | End: 2024-01-01

## 2024-01-01 RX ORDER — CASTOR OIL AND BALSAM, PERU 788; 87 MG/G; MG/G
1 OINTMENT TOPICAL EVERY 12 HOURS SCHEDULED
Status: DISCONTINUED | OUTPATIENT
Start: 2024-01-01 | End: 2024-01-01 | Stop reason: HOSPADM

## 2024-01-01 RX ORDER — FENTANYL CITRATE 50 UG/ML
INJECTION, SOLUTION INTRAMUSCULAR; INTRAVENOUS
Status: COMPLETED
Start: 2024-01-01 | End: 2024-01-01

## 2024-01-01 RX ORDER — IPRATROPIUM BROMIDE AND ALBUTEROL SULFATE 2.5; .5 MG/3ML; MG/3ML
3 SOLUTION RESPIRATORY (INHALATION)
Status: DISCONTINUED | OUTPATIENT
Start: 2024-01-01 | End: 2024-01-01

## 2024-01-01 RX ORDER — FAMOTIDINE 10 MG/ML
20 INJECTION, SOLUTION INTRAVENOUS DAILY
Status: DISCONTINUED | OUTPATIENT
Start: 2024-01-01 | End: 2024-01-01

## 2024-01-01 RX ORDER — POTASSIUM CHLORIDE 1.5 G/1.58G
40 POWDER, FOR SOLUTION ORAL 2 TIMES DAILY
Status: COMPLETED | OUTPATIENT
Start: 2024-01-01 | End: 2024-01-01

## 2024-01-01 RX ORDER — NOREPINEPHRINE BITARTRATE 0.03 MG/ML
INJECTION, SOLUTION INTRAVENOUS
Status: COMPLETED
Start: 2024-01-01 | End: 2024-01-01

## 2024-01-01 RX ORDER — SODIUM CHLORIDE 0.9 % (FLUSH) 0.9 %
10 SYRINGE (ML) INJECTION AS NEEDED
Status: DISCONTINUED | OUTPATIENT
Start: 2024-01-01 | End: 2024-01-01 | Stop reason: HOSPADM

## 2024-01-01 RX ORDER — LOPERAMIDE HYDROCHLORIDE 2 MG/1
2 CAPSULE ORAL 4 TIMES DAILY PRN
Status: DISCONTINUED | OUTPATIENT
Start: 2024-01-01 | End: 2024-01-01 | Stop reason: HOSPADM

## 2024-01-01 RX ORDER — NALOXONE HYDROCHLORIDE 0.4 MG/ML
INJECTION, SOLUTION INTRAMUSCULAR; INTRAVENOUS; SUBCUTANEOUS
Status: DISCONTINUED | OUTPATIENT
Start: 2024-01-01 | End: 2024-01-01 | Stop reason: HOSPADM

## 2024-01-01 RX ORDER — POTASSIUM CHLORIDE 7.45 MG/ML
10 INJECTION INTRAVENOUS
Status: COMPLETED | OUTPATIENT
Start: 2024-01-01 | End: 2024-01-01

## 2024-01-01 RX ORDER — HEPARIN SODIUM 1000 [USP'U]/ML
INJECTION, SOLUTION INTRAVENOUS; SUBCUTANEOUS
Status: DISCONTINUED | OUTPATIENT
Start: 2024-01-01 | End: 2024-01-01 | Stop reason: HOSPADM

## 2024-01-01 RX ORDER — ENOXAPARIN SODIUM 100 MG/ML
30 INJECTION SUBCUTANEOUS NIGHTLY
Status: DISCONTINUED | OUTPATIENT
Start: 2024-01-01 | End: 2024-01-01 | Stop reason: HOSPADM

## 2024-01-01 RX ORDER — TORSEMIDE 20 MG/1
40 TABLET ORAL DAILY
Status: DISCONTINUED | OUTPATIENT
Start: 2024-01-01 | End: 2024-01-01 | Stop reason: HOSPADM

## 2024-01-01 RX ORDER — ONDANSETRON 2 MG/ML
4 INJECTION INTRAMUSCULAR; INTRAVENOUS EVERY 6 HOURS PRN
Status: DISCONTINUED | OUTPATIENT
Start: 2024-01-01 | End: 2024-01-01 | Stop reason: HOSPADM

## 2024-01-01 RX ORDER — ASPIRIN 325 MG
TABLET ORAL
Status: DISCONTINUED | OUTPATIENT
Start: 2024-01-01 | End: 2024-01-01 | Stop reason: HOSPADM

## 2024-01-01 RX ORDER — ASPIRIN 81 MG/1
81 TABLET ORAL DAILY
Status: DISCONTINUED | OUTPATIENT
Start: 2024-01-01 | End: 2024-01-01

## 2024-01-01 RX ORDER — NITROGLYCERIN 0.4 MG/1
0.4 TABLET SUBLINGUAL
Status: DISCONTINUED | OUTPATIENT
Start: 2024-01-01 | End: 2024-01-01 | Stop reason: HOSPADM

## 2024-01-01 RX ORDER — PROMETHAZINE HYDROCHLORIDE 25 MG/1
25 TABLET ORAL ONCE AS NEEDED
Status: DISCONTINUED | OUTPATIENT
Start: 2024-01-01 | End: 2024-01-01 | Stop reason: HOSPADM

## 2024-01-01 RX ORDER — SODIUM CHLORIDE 0.9 % (FLUSH) 0.9 %
10 SYRINGE (ML) INJECTION ONCE
Status: COMPLETED | OUTPATIENT
Start: 2024-01-01 | End: 2024-01-01

## 2024-01-01 RX ORDER — METOPROLOL TARTRATE 25 MG/1
12.5 TABLET, FILM COATED ORAL EVERY 12 HOURS SCHEDULED
Status: DISCONTINUED | OUTPATIENT
Start: 2024-01-01 | End: 2024-01-01 | Stop reason: HOSPADM

## 2024-01-01 RX ORDER — LORAZEPAM 2 MG/ML
INJECTION INTRAMUSCULAR
Status: COMPLETED
Start: 2024-01-01 | End: 2024-01-01

## 2024-01-01 RX ORDER — POTASSIUM CHLORIDE 1.5 G/1.58G
20 POWDER, FOR SOLUTION ORAL ONCE
Status: COMPLETED | OUTPATIENT
Start: 2024-01-01 | End: 2024-01-01

## 2024-01-01 RX ORDER — FUROSEMIDE 10 MG/ML
40 INJECTION INTRAMUSCULAR; INTRAVENOUS ONCE
Status: COMPLETED | OUTPATIENT
Start: 2024-01-01 | End: 2024-01-01

## 2024-01-01 RX ORDER — DEXTROSE MONOHYDRATE 25 G/50ML
25 INJECTION, SOLUTION INTRAVENOUS
Status: DISCONTINUED | OUTPATIENT
Start: 2024-01-01 | End: 2024-01-01 | Stop reason: HOSPADM

## 2024-01-01 RX ORDER — POTASSIUM CHLORIDE 1.5 G/1.58G
40 POWDER, FOR SOLUTION ORAL ONCE
Status: COMPLETED | OUTPATIENT
Start: 2024-01-01 | End: 2024-01-01

## 2024-01-01 RX ORDER — NICOTINE POLACRILEX 4 MG
15 LOZENGE BUCCAL
Status: DISCONTINUED | OUTPATIENT
Start: 2024-01-01 | End: 2024-01-01 | Stop reason: ALTCHOICE

## 2024-01-01 RX ORDER — IBUPROFEN 600 MG/1
1 TABLET ORAL
Status: DISCONTINUED | OUTPATIENT
Start: 2024-01-01 | End: 2024-01-01 | Stop reason: HOSPADM

## 2024-01-01 RX ORDER — INSULIN LISPRO 100 [IU]/ML
2-9 INJECTION, SOLUTION INTRAVENOUS; SUBCUTANEOUS
Status: DISCONTINUED | OUTPATIENT
Start: 2024-01-01 | End: 2024-01-01

## 2024-01-01 RX ORDER — SODIUM CHLORIDE 9 MG/ML
INJECTION, SOLUTION INTRAVENOUS
Status: DISCONTINUED | OUTPATIENT
Start: 2024-01-01 | End: 2024-01-01

## 2024-01-01 RX ORDER — MILRINONE LACTATE 0.2 MG/ML
0.25 INJECTION, SOLUTION INTRAVENOUS
Status: DISCONTINUED | OUTPATIENT
Start: 2024-01-01 | End: 2024-01-01

## 2024-01-01 RX ORDER — SODIUM CHLORIDE 9 MG/ML
100 INJECTION, SOLUTION INTRAVENOUS CONTINUOUS
Status: ACTIVE | OUTPATIENT
Start: 2024-01-01 | End: 2024-01-01

## 2024-01-01 RX ORDER — FAMOTIDINE 20 MG/1
20 TABLET, FILM COATED ORAL DAILY
Status: DISCONTINUED | OUTPATIENT
Start: 2024-01-01 | End: 2024-01-01 | Stop reason: HOSPADM

## 2024-01-01 RX ORDER — CLOPIDOGREL BISULFATE 75 MG/1
TABLET ORAL
Status: DISCONTINUED | OUTPATIENT
Start: 2024-01-01 | End: 2024-01-01 | Stop reason: HOSPADM

## 2024-01-01 RX ORDER — POTASSIUM CHLORIDE 750 MG/1
20 TABLET, FILM COATED, EXTENDED RELEASE ORAL ONCE
Status: COMPLETED | OUTPATIENT
Start: 2024-01-01 | End: 2024-01-01

## 2024-01-01 RX ORDER — LIDOCAINE HYDROCHLORIDE 10 MG/ML
INJECTION, SOLUTION INFILTRATION; PERINEURAL AS NEEDED
Status: DISCONTINUED | OUTPATIENT
Start: 2024-01-01 | End: 2024-01-01 | Stop reason: HOSPADM

## 2024-01-01 RX ORDER — FLUMAZENIL 0.1 MG/ML
INJECTION INTRAVENOUS
Status: DISCONTINUED | OUTPATIENT
Start: 2024-01-01 | End: 2024-01-01 | Stop reason: HOSPADM

## 2024-01-01 RX ORDER — ONDANSETRON 4 MG/1
4 TABLET, ORALLY DISINTEGRATING ORAL EVERY 6 HOURS PRN
Status: DISCONTINUED | OUTPATIENT
Start: 2024-01-01 | End: 2024-01-01 | Stop reason: HOSPADM

## 2024-01-01 RX ORDER — LIDOCAINE HYDROCHLORIDE 20 MG/ML
INJECTION, SOLUTION INFILTRATION; PERINEURAL
Status: DISCONTINUED | OUTPATIENT
Start: 2024-01-01 | End: 2024-01-01 | Stop reason: HOSPADM

## 2024-01-01 RX ORDER — IPRATROPIUM BROMIDE AND ALBUTEROL SULFATE 2.5; .5 MG/3ML; MG/3ML
3 SOLUTION RESPIRATORY (INHALATION)
Status: DISCONTINUED | OUTPATIENT
Start: 2024-01-01 | End: 2024-01-01 | Stop reason: HOSPADM

## 2024-01-01 RX ORDER — MILRINONE LACTATE 0.2 MG/ML
0.12 INJECTION, SOLUTION INTRAVENOUS
Status: DISCONTINUED | OUTPATIENT
Start: 2024-01-01 | End: 2024-01-01

## 2024-01-01 RX ORDER — FAMOTIDINE 10 MG/ML
20 INJECTION, SOLUTION INTRAVENOUS 2 TIMES DAILY
Status: DISCONTINUED | OUTPATIENT
Start: 2024-01-01 | End: 2024-01-01

## 2024-01-01 RX ORDER — CLOPIDOGREL BISULFATE 75 MG/1
75 TABLET ORAL DAILY
Status: DISCONTINUED | OUTPATIENT
Start: 2024-01-01 | End: 2024-01-01 | Stop reason: HOSPADM

## 2024-01-01 RX ORDER — NICOTINE POLACRILEX 4 MG
15 LOZENGE BUCCAL
Status: DISCONTINUED | OUTPATIENT
Start: 2024-01-01 | End: 2024-01-01 | Stop reason: HOSPADM

## 2024-01-01 RX ORDER — CHLORHEXIDINE GLUCONATE ORAL RINSE 1.2 MG/ML
15 SOLUTION DENTAL EVERY 12 HOURS SCHEDULED
Status: DISCONTINUED | OUTPATIENT
Start: 2024-01-01 | End: 2024-01-01 | Stop reason: HOSPADM

## 2024-01-01 RX ORDER — SODIUM CHLORIDE 0.9 % (FLUSH) 0.9 %
10 SYRINGE (ML) INJECTION AS NEEDED
Status: DISCONTINUED | OUTPATIENT
Start: 2024-01-01 | End: 2024-01-01

## 2024-01-01 RX ORDER — POTASSIUM CHLORIDE 1.5 G/1.58G
40 POWDER, FOR SOLUTION ORAL EVERY 4 HOURS
Status: COMPLETED | OUTPATIENT
Start: 2024-01-01 | End: 2024-01-01

## 2024-01-01 RX ORDER — DEXTROSE MONOHYDRATE 25 G/50ML
25 INJECTION, SOLUTION INTRAVENOUS
Status: DISCONTINUED | OUTPATIENT
Start: 2024-01-01 | End: 2024-01-01

## 2024-01-01 RX ORDER — ATORVASTATIN CALCIUM 20 MG/1
40 TABLET, FILM COATED ORAL NIGHTLY
Status: DISCONTINUED | OUTPATIENT
Start: 2024-01-01 | End: 2024-01-01 | Stop reason: HOSPADM

## 2024-01-01 RX ORDER — LORAZEPAM 2 MG/ML
1 INJECTION INTRAMUSCULAR ONCE
Status: COMPLETED | OUTPATIENT
Start: 2024-01-01 | End: 2024-01-01

## 2024-01-01 RX ORDER — IODIXANOL 320 MG/ML
200 INJECTION, SOLUTION INTRAVASCULAR
Status: COMPLETED | OUTPATIENT
Start: 2024-01-01 | End: 2024-01-01

## 2024-01-01 RX ORDER — PROPOFOL 10 MG/ML
VIAL (ML) INTRAVENOUS
Status: COMPLETED
Start: 2024-01-01 | End: 2024-01-01

## 2024-01-01 RX ORDER — FENTANYL CITRATE 50 UG/ML
INJECTION, SOLUTION INTRAMUSCULAR; INTRAVENOUS
Status: DISCONTINUED | OUTPATIENT
Start: 2024-01-01 | End: 2024-01-01 | Stop reason: HOSPADM

## 2024-01-01 RX ORDER — VENLAFAXINE 75 MG/1
37.5 TABLET ORAL 2 TIMES DAILY
Status: DISCONTINUED | OUTPATIENT
Start: 2024-01-01 | End: 2024-01-01 | Stop reason: HOSPADM

## 2024-01-01 RX ORDER — SODIUM CHLORIDE 0.9 % (FLUSH) 0.9 %
10 SYRINGE (ML) INJECTION
Status: COMPLETED | OUTPATIENT
Start: 2024-01-01 | End: 2024-01-01

## 2024-01-01 RX ORDER — FENTANYL CITRATE 50 UG/ML
100 INJECTION, SOLUTION INTRAMUSCULAR; INTRAVENOUS ONCE
Status: COMPLETED | OUTPATIENT
Start: 2024-01-01 | End: 2024-01-01

## 2024-01-01 RX ORDER — DROPERIDOL 2.5 MG/ML
0.62 INJECTION, SOLUTION INTRAMUSCULAR; INTRAVENOUS
Status: DISCONTINUED | OUTPATIENT
Start: 2024-01-01 | End: 2024-01-01 | Stop reason: HOSPADM

## 2024-01-01 RX ORDER — LIDOCAINE HYDROCHLORIDE 20 MG/ML
INJECTION, SOLUTION INFILTRATION; PERINEURAL AS NEEDED
Status: DISCONTINUED | OUTPATIENT
Start: 2024-01-01 | End: 2024-01-01 | Stop reason: SURG

## 2024-01-01 RX ORDER — HYDROCODONE BITARTRATE AND ACETAMINOPHEN 5; 325 MG/1; MG/1
1 TABLET ORAL EVERY 4 HOURS PRN
Status: DISCONTINUED | OUTPATIENT
Start: 2024-01-01 | End: 2024-01-01 | Stop reason: HOSPADM

## 2024-01-01 RX ORDER — HYDROCODONE BITARTRATE AND ACETAMINOPHEN 5; 325 MG/1; MG/1
1 TABLET ORAL EVERY 4 HOURS PRN
Status: DISCONTINUED | OUTPATIENT
Start: 2024-01-01 | End: 2024-01-01

## 2024-01-01 RX ORDER — NALOXONE HCL 0.4 MG/ML
0.2 VIAL (ML) INJECTION AS NEEDED
Status: DISCONTINUED | OUTPATIENT
Start: 2024-01-01 | End: 2024-01-01 | Stop reason: HOSPADM

## 2024-01-01 RX ORDER — DIPHENHYDRAMINE HYDROCHLORIDE 50 MG/ML
12.5 INJECTION INTRAMUSCULAR; INTRAVENOUS
Status: DISCONTINUED | OUTPATIENT
Start: 2024-01-01 | End: 2024-01-01 | Stop reason: HOSPADM

## 2024-01-01 RX ORDER — HYDRALAZINE HYDROCHLORIDE 20 MG/ML
10 INJECTION INTRAMUSCULAR; INTRAVENOUS EVERY 4 HOURS PRN
Status: DISCONTINUED | OUTPATIENT
Start: 2024-01-01 | End: 2024-01-01 | Stop reason: HOSPADM

## 2024-01-01 RX ORDER — SODIUM CHLORIDE 9 MG/ML
INJECTION, SOLUTION INTRAVENOUS CONTINUOUS PRN
Status: DISCONTINUED | OUTPATIENT
Start: 2024-01-01 | End: 2024-01-01 | Stop reason: SURG

## 2024-01-01 RX ORDER — FLUMAZENIL 0.1 MG/ML
0.2 INJECTION INTRAVENOUS AS NEEDED
Status: DISCONTINUED | OUTPATIENT
Start: 2024-01-01 | End: 2024-01-01 | Stop reason: HOSPADM

## 2024-01-01 RX ORDER — NOREPINEPHRINE BITARTRATE 0.03 MG/ML
.02-.3 INJECTION, SOLUTION INTRAVENOUS
Status: DISCONTINUED | OUTPATIENT
Start: 2024-01-01 | End: 2024-01-01

## 2024-01-01 RX ORDER — ONDANSETRON 2 MG/ML
4 INJECTION INTRAMUSCULAR; INTRAVENOUS ONCE AS NEEDED
Status: DISCONTINUED | OUTPATIENT
Start: 2024-01-01 | End: 2024-01-01 | Stop reason: HOSPADM

## 2024-01-01 RX ORDER — OLANZAPINE 10 MG/2ML
10 INJECTION, POWDER, FOR SOLUTION INTRAMUSCULAR ONCE AS NEEDED
Status: DISCONTINUED | OUTPATIENT
Start: 2024-01-01 | End: 2024-01-01 | Stop reason: HOSPADM

## 2024-01-01 RX ORDER — FUROSEMIDE 10 MG/ML
INJECTION INTRAMUSCULAR; INTRAVENOUS
Status: DISCONTINUED | OUTPATIENT
Start: 2024-01-01 | End: 2024-01-01 | Stop reason: HOSPADM

## 2024-01-01 RX ORDER — IBUPROFEN 600 MG/1
1 TABLET ORAL
Status: DISCONTINUED | OUTPATIENT
Start: 2024-01-01 | End: 2024-01-01

## 2024-01-01 RX ORDER — CLOPIDOGREL BISULFATE 75 MG/1
75 TABLET ORAL DAILY
Status: DISCONTINUED | OUTPATIENT
Start: 2024-01-01 | End: 2024-01-01

## 2024-01-01 RX ADMIN — METOPROLOL TARTRATE 12.5 MG: 25 TABLET, FILM COATED ORAL at 22:27

## 2024-01-01 RX ADMIN — CASTOR OIL AND BALSAM, PERU 1 APPLICATION: 788; 87 OINTMENT TOPICAL at 08:30

## 2024-01-01 RX ADMIN — ENOXAPARIN SODIUM 30 MG: 100 INJECTION SUBCUTANEOUS at 22:11

## 2024-01-01 RX ADMIN — CLOPIDOGREL BISULFATE 75 MG: 75 TABLET, FILM COATED ORAL at 09:13

## 2024-01-01 RX ADMIN — IPRATROPIUM BROMIDE AND ALBUTEROL SULFATE 3 ML: 2.5; .5 SOLUTION RESPIRATORY (INHALATION) at 15:46

## 2024-01-01 RX ADMIN — INSULIN HUMAN 8 UNITS: 100 INJECTION, SOLUTION PARENTERAL at 09:13

## 2024-01-01 RX ADMIN — ASPIRIN 81 MG: 81 TABLET, CHEWABLE ORAL at 10:49

## 2024-01-01 RX ADMIN — IPRATROPIUM BROMIDE AND ALBUTEROL SULFATE 3 ML: 2.5; .5 SOLUTION RESPIRATORY (INHALATION) at 07:58

## 2024-01-01 RX ADMIN — CLOPIDOGREL BISULFATE 75 MG: 75 TABLET, FILM COATED ORAL at 09:39

## 2024-01-01 RX ADMIN — VENLAFAXINE HYDROCHLORIDE 37.5 MG: 75 TABLET ORAL at 22:11

## 2024-01-01 RX ADMIN — INSULIN GLARGINE 25 UNITS: 100 INJECTION, SOLUTION SUBCUTANEOUS at 20:42

## 2024-01-01 RX ADMIN — IPRATROPIUM BROMIDE AND ALBUTEROL SULFATE 3 ML: 2.5; .5 SOLUTION RESPIRATORY (INHALATION) at 15:37

## 2024-01-01 RX ADMIN — CLOPIDOGREL BISULFATE 75 MG: 75 TABLET, FILM COATED ORAL at 10:48

## 2024-01-01 RX ADMIN — LIDOCAINE HYDROCHLORIDE 60 MG: 20 INJECTION, SOLUTION INFILTRATION; PERINEURAL at 08:12

## 2024-01-01 RX ADMIN — VENLAFAXINE HYDROCHLORIDE 37.5 MG: 75 TABLET ORAL at 21:31

## 2024-01-01 RX ADMIN — VENLAFAXINE HYDROCHLORIDE 37.5 MG: 75 TABLET ORAL at 21:14

## 2024-01-01 RX ADMIN — INSULIN HUMAN 12 UNITS: 100 INJECTION, SOLUTION PARENTERAL at 01:42

## 2024-01-01 RX ADMIN — INSULIN GLARGINE 20 UNITS: 100 INJECTION, SOLUTION SUBCUTANEOUS at 21:01

## 2024-01-01 RX ADMIN — AMOXICILLIN AND CLAVULANATE POTASSIUM 500 MG: 500; 125 TABLET, FILM COATED ORAL at 21:14

## 2024-01-01 RX ADMIN — FAMOTIDINE 20 MG: 20 TABLET, FILM COATED ORAL at 08:29

## 2024-01-01 RX ADMIN — VENLAFAXINE HYDROCHLORIDE 37.5 MG: 75 TABLET ORAL at 21:46

## 2024-01-01 RX ADMIN — METOPROLOL TARTRATE 12.5 MG: 25 TABLET, FILM COATED ORAL at 22:38

## 2024-01-01 RX ADMIN — INSULIN HUMAN 8 UNITS: 100 INJECTION, SOLUTION PARENTERAL at 17:44

## 2024-01-01 RX ADMIN — Medication 10 ML: at 14:19

## 2024-01-01 RX ADMIN — HYDROCODONE BITARTRATE AND ACETAMINOPHEN 1 TABLET: 5; 325 TABLET ORAL at 14:35

## 2024-01-01 RX ADMIN — FENTANYL CITRATE 50 MCG: 50 INJECTION, SOLUTION INTRAMUSCULAR; INTRAVENOUS at 06:50

## 2024-01-01 RX ADMIN — FAMOTIDINE 20 MG: 20 TABLET, FILM COATED ORAL at 08:07

## 2024-01-01 RX ADMIN — INSULIN HUMAN 8 UNITS: 100 INJECTION, SOLUTION PARENTERAL at 06:49

## 2024-01-01 RX ADMIN — TORSEMIDE 40 MG: 20 TABLET ORAL at 10:49

## 2024-01-01 RX ADMIN — INSULIN HUMAN 4 UNITS: 100 INJECTION, SOLUTION PARENTERAL at 21:13

## 2024-01-01 RX ADMIN — CHLORHEXIDINE GLUCONATE 15 ML: 1.2 RINSE ORAL at 08:59

## 2024-01-01 RX ADMIN — METOPROLOL TARTRATE 12.5 MG: 25 TABLET, FILM COATED ORAL at 08:15

## 2024-01-01 RX ADMIN — FAMOTIDINE 20 MG: 20 TABLET, FILM COATED ORAL at 09:35

## 2024-01-01 RX ADMIN — INSULIN HUMAN 8 UNITS: 100 INJECTION, SOLUTION PARENTERAL at 23:59

## 2024-01-01 RX ADMIN — IODIXANOL 65 ML: 320 INJECTION, SOLUTION INTRAVASCULAR at 16:08

## 2024-01-01 RX ADMIN — ACETAMINOPHEN 325MG 650 MG: 325 TABLET ORAL at 10:48

## 2024-01-01 RX ADMIN — POTASSIUM CHLORIDE 40 MEQ: 1.5 POWDER, FOR SOLUTION ORAL at 20:46

## 2024-01-01 RX ADMIN — PIPERACILLIN AND TAZOBACTAM 3.38 G: 3; .375 INJECTION, POWDER, LYOPHILIZED, FOR SOLUTION INTRAVENOUS at 17:26

## 2024-01-01 RX ADMIN — INSULIN HUMAN 2 UNITS: 100 INJECTION, SOLUTION PARENTERAL at 17:10

## 2024-01-01 RX ADMIN — POTASSIUM CHLORIDE 10 MEQ: 7.46 INJECTION, SOLUTION INTRAVENOUS at 05:42

## 2024-01-01 RX ADMIN — Medication 10 ML: at 14:21

## 2024-01-01 RX ADMIN — METOPROLOL TARTRATE 12.5 MG: 25 TABLET, FILM COATED ORAL at 21:03

## 2024-01-01 RX ADMIN — FAMOTIDINE 20 MG: 20 TABLET, FILM COATED ORAL at 11:05

## 2024-01-01 RX ADMIN — INSULIN HUMAN 8 UNITS: 100 INJECTION, SOLUTION PARENTERAL at 12:25

## 2024-01-01 RX ADMIN — INSULIN HUMAN 4 UNITS: 100 INJECTION, SOLUTION PARENTERAL at 04:55

## 2024-01-01 RX ADMIN — FAMOTIDINE 20 MG: 20 TABLET, FILM COATED ORAL at 08:49

## 2024-01-01 RX ADMIN — FENTANYL CITRATE 50 MCG: 50 INJECTION, SOLUTION INTRAMUSCULAR; INTRAVENOUS at 03:45

## 2024-01-01 RX ADMIN — INSULIN GLARGINE 20 UNITS: 100 INJECTION, SOLUTION SUBCUTANEOUS at 20:38

## 2024-01-01 RX ADMIN — INSULIN HUMAN 2.2 UNITS/HR: 1 INJECTION, SOLUTION INTRAVENOUS at 06:00

## 2024-01-01 RX ADMIN — METOPROLOL TARTRATE 12.5 MG: 25 TABLET, FILM COATED ORAL at 11:05

## 2024-01-01 RX ADMIN — AMOXICILLIN AND CLAVULANATE POTASSIUM 500 MG: 500; 125 TABLET, FILM COATED ORAL at 22:38

## 2024-01-01 RX ADMIN — CHLORHEXIDINE GLUCONATE 15 ML: 1.2 RINSE ORAL at 20:42

## 2024-01-01 RX ADMIN — ASPIRIN 81 MG: 81 TABLET, CHEWABLE ORAL at 08:29

## 2024-01-01 RX ADMIN — FAMOTIDINE 20 MG: 20 TABLET, FILM COATED ORAL at 09:39

## 2024-01-01 RX ADMIN — HYDROCODONE BITARTRATE AND ACETAMINOPHEN 1 TABLET: 5; 325 TABLET ORAL at 08:07

## 2024-01-01 RX ADMIN — INSULIN HUMAN 4 UNITS: 100 INJECTION, SOLUTION PARENTERAL at 12:38

## 2024-01-01 RX ADMIN — ASPIRIN 81 MG: 81 TABLET, CHEWABLE ORAL at 08:07

## 2024-01-01 RX ADMIN — INSULIN HUMAN 4 UNITS: 100 INJECTION, SOLUTION PARENTERAL at 17:47

## 2024-01-01 RX ADMIN — ATORVASTATIN CALCIUM 40 MG: 20 TABLET, FILM COATED ORAL at 22:11

## 2024-01-01 RX ADMIN — INSULIN HUMAN 12 UNITS: 100 INJECTION, SOLUTION PARENTERAL at 20:45

## 2024-01-01 RX ADMIN — INSULIN GLARGINE 30 UNITS: 100 INJECTION, SOLUTION SUBCUTANEOUS at 22:38

## 2024-01-01 RX ADMIN — CASTOR OIL AND BALSAM, PERU 1 APPLICATION: 788; 87 OINTMENT TOPICAL at 22:27

## 2024-01-01 RX ADMIN — IPRATROPIUM BROMIDE AND ALBUTEROL SULFATE 3 ML: 2.5; .5 SOLUTION RESPIRATORY (INHALATION) at 15:41

## 2024-01-01 RX ADMIN — VENLAFAXINE HYDROCHLORIDE 37.5 MG: 75 TABLET ORAL at 22:26

## 2024-01-01 RX ADMIN — PIPERACILLIN AND TAZOBACTAM 3.38 G: 3; .375 INJECTION, POWDER, LYOPHILIZED, FOR SOLUTION INTRAVENOUS at 12:18

## 2024-01-01 RX ADMIN — INSULIN HUMAN 4 UNITS: 100 INJECTION, SOLUTION PARENTERAL at 05:45

## 2024-01-01 RX ADMIN — INSULIN HUMAN 4 UNITS: 100 INJECTION, SOLUTION PARENTERAL at 04:26

## 2024-01-01 RX ADMIN — INSULIN HUMAN 8 UNITS: 100 INJECTION, SOLUTION PARENTERAL at 23:20

## 2024-01-01 RX ADMIN — FAMOTIDINE 20 MG: 10 INJECTION INTRAVENOUS at 20:28

## 2024-01-01 RX ADMIN — FAMOTIDINE 20 MG: 20 TABLET, FILM COATED ORAL at 08:15

## 2024-01-01 RX ADMIN — INSULIN HUMAN 4 UNITS: 100 INJECTION, SOLUTION PARENTERAL at 13:15

## 2024-01-01 RX ADMIN — METOPROLOL TARTRATE 12.5 MG: 25 TABLET, FILM COATED ORAL at 08:57

## 2024-01-01 RX ADMIN — FENTANYL CITRATE 100 MCG: 50 INJECTION, SOLUTION INTRAMUSCULAR; INTRAVENOUS at 16:46

## 2024-01-01 RX ADMIN — TENECTEPLASE 13 MG: KIT at 14:20

## 2024-01-01 RX ADMIN — CASTOR OIL AND BALSAM, PERU 1 APPLICATION: 788; 87 OINTMENT TOPICAL at 09:39

## 2024-01-01 RX ADMIN — INSULIN GLARGINE 30 UNITS: 100 INJECTION, SOLUTION SUBCUTANEOUS at 08:30

## 2024-01-01 RX ADMIN — INSULIN GLARGINE 30 UNITS: 100 INJECTION, SOLUTION SUBCUTANEOUS at 08:57

## 2024-01-01 RX ADMIN — CLOPIDOGREL BISULFATE 75 MG: 75 TABLET, FILM COATED ORAL at 11:05

## 2024-01-01 RX ADMIN — IPRATROPIUM BROMIDE AND ALBUTEROL SULFATE 3 ML: 2.5; .5 SOLUTION RESPIRATORY (INHALATION) at 19:55

## 2024-01-01 RX ADMIN — FENTANYL CITRATE 50 MCG: 50 INJECTION, SOLUTION INTRAMUSCULAR; INTRAVENOUS at 18:48

## 2024-01-01 RX ADMIN — ATORVASTATIN CALCIUM 40 MG: 20 TABLET, FILM COATED ORAL at 20:57

## 2024-01-01 RX ADMIN — INSULIN HUMAN 4 UNITS: 100 INJECTION, SOLUTION PARENTERAL at 02:55

## 2024-01-01 RX ADMIN — HYDROCODONE BITARTRATE AND ACETAMINOPHEN 1 TABLET: 5; 325 TABLET ORAL at 23:06

## 2024-01-01 RX ADMIN — ACETAMINOPHEN 325MG 650 MG: 325 TABLET ORAL at 21:54

## 2024-01-01 RX ADMIN — PIPERACILLIN AND TAZOBACTAM 3.38 G: 3; .375 INJECTION, POWDER, LYOPHILIZED, FOR SOLUTION INTRAVENOUS at 01:24

## 2024-01-01 RX ADMIN — FENTANYL CITRATE 50 MCG: 50 INJECTION, SOLUTION INTRAMUSCULAR; INTRAVENOUS at 18:55

## 2024-01-01 RX ADMIN — IPRATROPIUM BROMIDE AND ALBUTEROL SULFATE 3 ML: 2.5; .5 SOLUTION RESPIRATORY (INHALATION) at 11:58

## 2024-01-01 RX ADMIN — CLOPIDOGREL BISULFATE 75 MG: 75 TABLET, FILM COATED ORAL at 08:47

## 2024-01-01 RX ADMIN — Medication 10 ML: at 08:04

## 2024-01-01 RX ADMIN — ENOXAPARIN SODIUM 30 MG: 100 INJECTION SUBCUTANEOUS at 20:45

## 2024-01-01 RX ADMIN — CLOPIDOGREL BISULFATE 75 MG: 75 TABLET, FILM COATED ORAL at 08:08

## 2024-01-01 RX ADMIN — INSULIN HUMAN 2 UNITS: 100 INJECTION, SOLUTION PARENTERAL at 12:49

## 2024-01-01 RX ADMIN — INSULIN HUMAN 4 UNITS: 100 INJECTION, SOLUTION PARENTERAL at 20:54

## 2024-01-01 RX ADMIN — INSULIN HUMAN 8 UNITS: 100 INJECTION, SOLUTION PARENTERAL at 20:39

## 2024-01-01 RX ADMIN — NOREPINEPHRINE BITARTRATE 0.04 MCG/KG/MIN: 0.03 INJECTION, SOLUTION INTRAVENOUS at 17:50

## 2024-01-01 RX ADMIN — ATORVASTATIN CALCIUM 40 MG: 20 TABLET, FILM COATED ORAL at 21:03

## 2024-01-01 RX ADMIN — CHLORHEXIDINE GLUCONATE 15 ML: 1.2 RINSE ORAL at 21:14

## 2024-01-01 RX ADMIN — MILRINONE LACTATE 0.25 MCG/KG/MIN: 0.2 INJECTION, SOLUTION INTRAVENOUS at 01:52

## 2024-01-01 RX ADMIN — FAMOTIDINE 20 MG: 20 TABLET, FILM COATED ORAL at 09:14

## 2024-01-01 RX ADMIN — CASTOR OIL AND BALSAM, PERU 1 APPLICATION: 788; 87 OINTMENT TOPICAL at 20:46

## 2024-01-01 RX ADMIN — CHLORHEXIDINE GLUCONATE 15 ML: 1.2 RINSE ORAL at 20:38

## 2024-01-01 RX ADMIN — CHLORHEXIDINE GLUCONATE 15 ML: 1.2 RINSE ORAL at 08:16

## 2024-01-01 RX ADMIN — METOPROLOL TARTRATE 12.5 MG: 25 TABLET, FILM COATED ORAL at 21:14

## 2024-01-01 RX ADMIN — IPRATROPIUM BROMIDE AND ALBUTEROL SULFATE 3 ML: 2.5; .5 SOLUTION RESPIRATORY (INHALATION) at 07:00

## 2024-01-01 RX ADMIN — PROPOFOL INJECTABLE EMULSION 10 MCG/KG/MIN: 10 INJECTION, EMULSION INTRAVENOUS at 16:52

## 2024-01-01 RX ADMIN — ASPIRIN 81 MG: 81 TABLET, CHEWABLE ORAL at 11:05

## 2024-01-01 RX ADMIN — POTASSIUM CHLORIDE 10 MEQ: 7.46 INJECTION, SOLUTION INTRAVENOUS at 04:32

## 2024-01-01 RX ADMIN — PIPERACILLIN AND TAZOBACTAM 3.38 G: 3; .375 INJECTION, POWDER, LYOPHILIZED, FOR SOLUTION INTRAVENOUS at 17:21

## 2024-01-01 RX ADMIN — Medication 10 ML: at 09:13

## 2024-01-01 RX ADMIN — AMOXICILLIN AND CLAVULANATE POTASSIUM 500 MG: 500; 125 TABLET, FILM COATED ORAL at 10:54

## 2024-01-01 RX ADMIN — CASTOR OIL AND BALSAM, PERU 1 APPLICATION: 788; 87 OINTMENT TOPICAL at 09:21

## 2024-01-01 RX ADMIN — ASPIRIN 81 MG: 81 TABLET, CHEWABLE ORAL at 08:30

## 2024-01-01 RX ADMIN — PHENYLEPHRINE HYDROCHLORIDE 100 MCG: 10 INJECTION INTRAVENOUS at 08:27

## 2024-01-01 RX ADMIN — CHLORHEXIDINE GLUCONATE 15 ML: 1.2 RINSE ORAL at 20:57

## 2024-01-01 RX ADMIN — METOPROLOL TARTRATE 12.5 MG: 25 TABLET, FILM COATED ORAL at 08:49

## 2024-01-01 RX ADMIN — HYDROCODONE BITARTRATE AND ACETAMINOPHEN 1 TABLET: 5; 325 TABLET ORAL at 21:54

## 2024-01-01 RX ADMIN — HYDROCODONE BITARTRATE AND ACETAMINOPHEN 1 TABLET: 5; 325 TABLET ORAL at 02:33

## 2024-01-01 RX ADMIN — MILRINONE LACTATE 0.25 MCG/KG/MIN: 0.2 INJECTION, SOLUTION INTRAVENOUS at 15:22

## 2024-01-01 RX ADMIN — ASPIRIN 81 MG: 81 TABLET, CHEWABLE ORAL at 09:39

## 2024-01-01 RX ADMIN — CLOPIDOGREL BISULFATE 75 MG: 75 TABLET, FILM COATED ORAL at 08:29

## 2024-01-01 RX ADMIN — ENOXAPARIN SODIUM 30 MG: 100 INJECTION SUBCUTANEOUS at 21:31

## 2024-01-01 RX ADMIN — VENLAFAXINE HYDROCHLORIDE 37.5 MG: 75 TABLET ORAL at 21:47

## 2024-01-01 RX ADMIN — INSULIN GLARGINE 30 UNITS: 100 INJECTION, SOLUTION SUBCUTANEOUS at 21:34

## 2024-01-01 RX ADMIN — CHLORHEXIDINE GLUCONATE 15 ML: 1.2 RINSE ORAL at 08:22

## 2024-01-01 RX ADMIN — PIPERACILLIN AND TAZOBACTAM 3.38 G: 3; .375 INJECTION, POWDER, LYOPHILIZED, FOR SOLUTION INTRAVENOUS at 01:42

## 2024-01-01 RX ADMIN — CHLORHEXIDINE GLUCONATE 15 ML: 1.2 RINSE ORAL at 21:19

## 2024-01-01 RX ADMIN — INSULIN HUMAN 12 UNITS: 100 INJECTION, SOLUTION PARENTERAL at 08:47

## 2024-01-01 RX ADMIN — FENTANYL CITRATE 50 MCG: 50 INJECTION, SOLUTION INTRAMUSCULAR; INTRAVENOUS at 06:04

## 2024-01-01 RX ADMIN — CHLORHEXIDINE GLUCONATE 15 ML: 1.2 RINSE ORAL at 08:04

## 2024-01-01 RX ADMIN — Medication 10 ML: at 20:28

## 2024-01-01 RX ADMIN — INSULIN GLARGINE 30 UNITS: 100 INJECTION, SOLUTION SUBCUTANEOUS at 21:04

## 2024-01-01 RX ADMIN — PIPERACILLIN AND TAZOBACTAM 3.38 G: 3; .375 INJECTION, POWDER, LYOPHILIZED, FOR SOLUTION INTRAVENOUS at 08:34

## 2024-01-01 RX ADMIN — IPRATROPIUM BROMIDE AND ALBUTEROL SULFATE 3 ML: 2.5; .5 SOLUTION RESPIRATORY (INHALATION) at 07:30

## 2024-01-01 RX ADMIN — POTASSIUM CHLORIDE 20 MEQ: 1.5 FOR SOLUTION ORAL at 09:39

## 2024-01-01 RX ADMIN — CASTOR OIL AND BALSAM, PERU 1 APPLICATION: 788; 87 OINTMENT TOPICAL at 08:18

## 2024-01-01 RX ADMIN — FENTANYL CITRATE 50 MCG: 50 INJECTION, SOLUTION INTRAMUSCULAR; INTRAVENOUS at 20:46

## 2024-01-01 RX ADMIN — IPRATROPIUM BROMIDE AND ALBUTEROL SULFATE 3 ML: 2.5; .5 SOLUTION RESPIRATORY (INHALATION) at 14:56

## 2024-01-01 RX ADMIN — SODIUM CHLORIDE: 9 INJECTION, SOLUTION INTRAVENOUS at 08:00

## 2024-01-01 RX ADMIN — ASPIRIN 81 MG: 81 TABLET, CHEWABLE ORAL at 09:13

## 2024-01-01 RX ADMIN — CHLORHEXIDINE GLUCONATE 15 ML: 1.2 RINSE ORAL at 08:18

## 2024-01-01 RX ADMIN — FAMOTIDINE 20 MG: 10 INJECTION INTRAVENOUS at 08:22

## 2024-01-01 RX ADMIN — PROPOFOL 10 MCG/KG/MIN: 10 INJECTION, EMULSION INTRAVENOUS at 16:52

## 2024-01-01 RX ADMIN — CHLORHEXIDINE GLUCONATE 15 ML: 1.2 RINSE ORAL at 09:13

## 2024-01-01 RX ADMIN — VENLAFAXINE HYDROCHLORIDE 37.5 MG: 75 TABLET ORAL at 08:29

## 2024-01-01 RX ADMIN — ONDANSETRON 4 MG: 2 INJECTION, SOLUTION INTRAMUSCULAR; INTRAVENOUS at 22:04

## 2024-01-01 RX ADMIN — ATORVASTATIN CALCIUM 40 MG: 20 TABLET, FILM COATED ORAL at 21:14

## 2024-01-01 RX ADMIN — ATORVASTATIN CALCIUM 40 MG: 20 TABLET, FILM COATED ORAL at 20:46

## 2024-01-01 RX ADMIN — INSULIN HUMAN 8 UNITS: 100 INJECTION, SOLUTION PARENTERAL at 12:39

## 2024-01-01 RX ADMIN — METOPROLOL TARTRATE 12.5 MG: 25 TABLET, FILM COATED ORAL at 08:29

## 2024-01-01 RX ADMIN — Medication 0.04 MCG/KG/MIN: at 17:50

## 2024-01-01 RX ADMIN — VENLAFAXINE HYDROCHLORIDE 37.5 MG: 75 TABLET ORAL at 09:38

## 2024-01-01 RX ADMIN — CEFTRIAXONE SODIUM 1000 MG: 1 INJECTION, POWDER, FOR SOLUTION INTRAMUSCULAR; INTRAVENOUS at 13:00

## 2024-01-01 RX ADMIN — INSULIN GLARGINE 30 UNITS: 100 INJECTION, SOLUTION SUBCUTANEOUS at 22:12

## 2024-01-01 RX ADMIN — INSULIN HUMAN 4 UNITS: 100 INJECTION, SOLUTION PARENTERAL at 17:00

## 2024-01-01 RX ADMIN — FUROSEMIDE 40 MG: 10 INJECTION, SOLUTION INTRAMUSCULAR; INTRAVENOUS at 14:48

## 2024-01-01 RX ADMIN — CEFTRIAXONE SODIUM 1000 MG: 1 INJECTION, POWDER, FOR SOLUTION INTRAMUSCULAR; INTRAVENOUS at 12:39

## 2024-01-01 RX ADMIN — VENLAFAXINE HYDROCHLORIDE 37.5 MG: 75 TABLET ORAL at 09:34

## 2024-01-01 RX ADMIN — INSULIN HUMAN 4 UNITS: 100 INJECTION, SOLUTION PARENTERAL at 18:34

## 2024-01-01 RX ADMIN — CHLORHEXIDINE GLUCONATE 15 ML: 1.2 RINSE ORAL at 09:56

## 2024-01-01 RX ADMIN — INSULIN HUMAN 8 UNITS: 100 INJECTION, SOLUTION PARENTERAL at 17:17

## 2024-01-01 RX ADMIN — CHLORHEXIDINE GLUCONATE 15 ML: 1.2 RINSE ORAL at 21:43

## 2024-01-01 RX ADMIN — CLOPIDOGREL BISULFATE 75 MG: 75 TABLET, FILM COATED ORAL at 08:07

## 2024-01-01 RX ADMIN — CHLORHEXIDINE GLUCONATE 15 ML: 1.2 RINSE ORAL at 22:20

## 2024-01-01 RX ADMIN — POTASSIUM CHLORIDE 10 MEQ: 7.46 INJECTION, SOLUTION INTRAVENOUS at 06:50

## 2024-01-01 RX ADMIN — PHENYLEPHRINE HYDROCHLORIDE 200 MCG: 10 INJECTION INTRAVENOUS at 08:18

## 2024-01-01 RX ADMIN — INSULIN HUMAN 12 UNITS: 100 INJECTION, SOLUTION PARENTERAL at 01:07

## 2024-01-01 RX ADMIN — ENOXAPARIN SODIUM 30 MG: 100 INJECTION SUBCUTANEOUS at 21:43

## 2024-01-01 RX ADMIN — METOPROLOL TARTRATE 12.5 MG: 25 TABLET, FILM COATED ORAL at 21:47

## 2024-01-01 RX ADMIN — ATORVASTATIN CALCIUM 40 MG: 20 TABLET, FILM COATED ORAL at 20:54

## 2024-01-01 RX ADMIN — CASTOR OIL AND BALSAM, PERU 1 APPLICATION: 788; 87 OINTMENT TOPICAL at 21:43

## 2024-01-01 RX ADMIN — IPRATROPIUM BROMIDE AND ALBUTEROL SULFATE 3 ML: 2.5; .5 SOLUTION RESPIRATORY (INHALATION) at 07:44

## 2024-01-01 RX ADMIN — INSULIN GLARGINE 30 UNITS: 100 INJECTION, SOLUTION SUBCUTANEOUS at 09:39

## 2024-01-01 RX ADMIN — SODIUM CHLORIDE 100 ML/HR: 9 INJECTION, SOLUTION INTRAVENOUS at 03:10

## 2024-01-01 RX ADMIN — HYDROCODONE BITARTRATE AND ACETAMINOPHEN 1 TABLET: 5; 325 TABLET ORAL at 09:13

## 2024-01-01 RX ADMIN — CHLORHEXIDINE GLUCONATE 15 ML: 1.2 RINSE ORAL at 20:46

## 2024-01-01 RX ADMIN — INSULIN HUMAN 8 UNITS: 100 INJECTION, SOLUTION PARENTERAL at 12:44

## 2024-01-01 RX ADMIN — VENLAFAXINE HYDROCHLORIDE 37.5 MG: 75 TABLET ORAL at 08:57

## 2024-01-01 RX ADMIN — PROPOFOL 120 MG: 10 INJECTION, EMULSION INTRAVENOUS at 08:12

## 2024-01-01 RX ADMIN — INSULIN HUMAN 8 UNITS: 100 INJECTION, SOLUTION PARENTERAL at 12:22

## 2024-01-01 RX ADMIN — POTASSIUM CHLORIDE 40 MEQ: 750 TABLET, EXTENDED RELEASE ORAL at 08:47

## 2024-01-01 RX ADMIN — ENOXAPARIN SODIUM 30 MG: 100 INJECTION SUBCUTANEOUS at 21:34

## 2024-01-01 RX ADMIN — PROPOFOL 200 MCG/KG/MIN: 10 INJECTION, EMULSION INTRAVENOUS at 08:12

## 2024-01-01 RX ADMIN — IPRATROPIUM BROMIDE AND ALBUTEROL SULFATE 3 ML: 2.5; .5 SOLUTION RESPIRATORY (INHALATION) at 20:14

## 2024-01-01 RX ADMIN — IPRATROPIUM BROMIDE AND ALBUTEROL SULFATE 3 ML: 2.5; .5 SOLUTION RESPIRATORY (INHALATION) at 20:28

## 2024-01-01 RX ADMIN — INSULIN GLARGINE 30 UNITS: 100 INJECTION, SOLUTION SUBCUTANEOUS at 21:42

## 2024-01-01 RX ADMIN — INSULIN HUMAN 4 UNITS: 100 INJECTION, SOLUTION PARENTERAL at 05:19

## 2024-01-01 RX ADMIN — FENTANYL CITRATE 50 MCG: 50 INJECTION, SOLUTION INTRAMUSCULAR; INTRAVENOUS at 17:44

## 2024-01-01 RX ADMIN — CHLORHEXIDINE GLUCONATE 15 ML: 1.2 RINSE ORAL at 22:27

## 2024-01-01 RX ADMIN — ATORVASTATIN CALCIUM 40 MG: 20 TABLET, FILM COATED ORAL at 22:26

## 2024-01-01 RX ADMIN — LORAZEPAM 1 MG: 2 INJECTION INTRAMUSCULAR at 14:15

## 2024-01-01 RX ADMIN — CASTOR OIL AND BALSAM, PERU 1 APPLICATION: 788; 87 OINTMENT TOPICAL at 21:14

## 2024-01-01 RX ADMIN — ENOXAPARIN SODIUM 30 MG: 100 INJECTION SUBCUTANEOUS at 20:56

## 2024-01-01 RX ADMIN — CHLORHEXIDINE GLUCONATE 15 ML: 1.2 RINSE ORAL at 10:51

## 2024-01-01 RX ADMIN — LORAZEPAM 1 MG: 2 INJECTION INTRAMUSCULAR; INTRAVENOUS at 14:15

## 2024-01-01 RX ADMIN — ASPIRIN 81 MG: 81 TABLET, CHEWABLE ORAL at 08:47

## 2024-01-01 RX ADMIN — FUROSEMIDE 100 MG: 10 INJECTION, SOLUTION INTRAMUSCULAR; INTRAVENOUS at 01:24

## 2024-01-01 RX ADMIN — ENOXAPARIN SODIUM 30 MG: 100 INJECTION SUBCUTANEOUS at 21:13

## 2024-01-01 RX ADMIN — FUROSEMIDE 60 MG: 10 INJECTION, SOLUTION INTRAMUSCULAR; INTRAVENOUS at 18:37

## 2024-01-01 RX ADMIN — INSULIN GLARGINE 30 UNITS: 100 INJECTION, SOLUTION SUBCUTANEOUS at 08:08

## 2024-01-01 RX ADMIN — AMOXICILLIN AND CLAVULANATE POTASSIUM 500 MG: 500; 125 TABLET, FILM COATED ORAL at 11:41

## 2024-01-01 RX ADMIN — INSULIN HUMAN 8 UNITS: 100 INJECTION, SOLUTION PARENTERAL at 21:04

## 2024-01-01 RX ADMIN — INSULIN GLARGINE 30 UNITS: 100 INJECTION, SOLUTION SUBCUTANEOUS at 22:26

## 2024-01-01 RX ADMIN — INSULIN HUMAN 5.4 UNITS/HR: 1 INJECTION, SOLUTION INTRAVENOUS at 18:43

## 2024-01-01 RX ADMIN — INSULIN HUMAN 8 UNITS: 100 INJECTION, SOLUTION PARENTERAL at 12:31

## 2024-01-01 RX ADMIN — METOPROLOL TARTRATE 12.5 MG: 25 TABLET, FILM COATED ORAL at 08:54

## 2024-01-01 RX ADMIN — PROPOFOL INJECTABLE EMULSION 15 MCG/KG/MIN: 10 INJECTION, EMULSION INTRAVENOUS at 23:18

## 2024-01-01 RX ADMIN — INSULIN GLARGINE 20 UNITS: 100 INJECTION, SOLUTION SUBCUTANEOUS at 11:51

## 2024-01-01 RX ADMIN — HYDROCODONE BITARTRATE AND ACETAMINOPHEN 1 TABLET: 5; 325 TABLET ORAL at 08:02

## 2024-01-01 RX ADMIN — CHLORHEXIDINE GLUCONATE 15 ML: 1.2 RINSE ORAL at 20:33

## 2024-01-01 RX ADMIN — ASPIRIN 81 MG: 81 TABLET, CHEWABLE ORAL at 08:57

## 2024-01-01 RX ADMIN — INSULIN HUMAN 4 UNITS: 100 INJECTION, SOLUTION PARENTERAL at 17:21

## 2024-01-01 RX ADMIN — CLOPIDOGREL BISULFATE 75 MG: 75 TABLET, FILM COATED ORAL at 09:35

## 2024-01-01 RX ADMIN — CASTOR OIL AND BALSAM, PERU 1 APPLICATION: 788; 87 OINTMENT TOPICAL at 08:57

## 2024-01-01 RX ADMIN — INSULIN HUMAN 4 UNITS: 100 INJECTION, SOLUTION PARENTERAL at 04:21

## 2024-01-01 RX ADMIN — INSULIN HUMAN 4 UNITS: 100 INJECTION, SOLUTION PARENTERAL at 00:53

## 2024-01-01 RX ADMIN — ATORVASTATIN CALCIUM 40 MG: 20 TABLET, FILM COATED ORAL at 21:42

## 2024-01-01 RX ADMIN — CHLORHEXIDINE GLUCONATE 15 ML: 1.2 RINSE ORAL at 08:57

## 2024-01-01 RX ADMIN — INSULIN HUMAN 4 UNITS: 100 INJECTION, SOLUTION PARENTERAL at 03:19

## 2024-01-01 RX ADMIN — INSULIN HUMAN 4 UNITS: 100 INJECTION, SOLUTION PARENTERAL at 16:31

## 2024-01-01 RX ADMIN — MILRINONE LACTATE 0.25 MCG/KG/MIN: 0.2 INJECTION, SOLUTION INTRAVENOUS at 16:55

## 2024-01-01 RX ADMIN — SODIUM PHOSPHATE, MONOBASIC, MONOHYDRATE AND SODIUM PHOSPHATE, DIBASIC, ANHYDROUS 15 MMOL: 276; 142 INJECTION, SOLUTION INTRAVENOUS at 12:25

## 2024-01-01 RX ADMIN — SODIUM CHLORIDE 100 ML/HR: 9 INJECTION, SOLUTION INTRAVENOUS at 17:17

## 2024-01-01 RX ADMIN — INSULIN GLARGINE 30 UNITS: 100 INJECTION, SOLUTION SUBCUTANEOUS at 08:18

## 2024-01-01 RX ADMIN — CASTOR OIL AND BALSAM, PERU 1 APPLICATION: 788; 87 OINTMENT TOPICAL at 22:12

## 2024-01-01 RX ADMIN — VENLAFAXINE HYDROCHLORIDE 37.5 MG: 75 TABLET ORAL at 10:48

## 2024-01-01 RX ADMIN — VENLAFAXINE HYDROCHLORIDE 37.5 MG: 75 TABLET ORAL at 20:46

## 2024-01-01 RX ADMIN — POTASSIUM CHLORIDE 40 MEQ: 1.5 POWDER, FOR SOLUTION ORAL at 11:13

## 2024-01-01 RX ADMIN — INSULIN HUMAN 4 UNITS: 100 INJECTION, SOLUTION PARENTERAL at 12:27

## 2024-01-01 RX ADMIN — VENLAFAXINE HYDROCHLORIDE 37.5 MG: 75 TABLET ORAL at 11:05

## 2024-01-01 RX ADMIN — TORSEMIDE 40 MG: 20 TABLET ORAL at 08:14

## 2024-01-01 RX ADMIN — Medication 10 ML: at 20:38

## 2024-01-01 RX ADMIN — INSULIN HUMAN 4 UNITS: 100 INJECTION, SOLUTION PARENTERAL at 00:45

## 2024-01-01 RX ADMIN — CASTOR OIL AND BALSAM, PERU 1 APPLICATION: 788; 87 OINTMENT TOPICAL at 10:51

## 2024-01-01 RX ADMIN — HYDROCODONE BITARTRATE AND ACETAMINOPHEN 1 TABLET: 5; 325 TABLET ORAL at 17:44

## 2024-01-01 RX ADMIN — CLOPIDOGREL BISULFATE 75 MG: 75 TABLET, FILM COATED ORAL at 08:57

## 2024-01-01 RX ADMIN — MILRINONE LACTATE 0.25 MCG/KG/MIN: 0.2 INJECTION, SOLUTION INTRAVENOUS at 18:40

## 2024-01-01 RX ADMIN — METOPROLOL TARTRATE 12.5 MG: 25 TABLET, FILM COATED ORAL at 20:42

## 2024-01-01 RX ADMIN — FENTANYL CITRATE 50 MCG: 50 INJECTION, SOLUTION INTRAMUSCULAR; INTRAVENOUS at 01:45

## 2024-01-01 RX ADMIN — METOPROLOL TARTRATE 12.5 MG: 25 TABLET, FILM COATED ORAL at 20:46

## 2024-01-01 RX ADMIN — CEFTRIAXONE SODIUM 1000 MG: 1 INJECTION, POWDER, FOR SOLUTION INTRAMUSCULAR; INTRAVENOUS at 13:52

## 2024-01-01 RX ADMIN — CHLORHEXIDINE GLUCONATE 15 ML: 1.2 RINSE ORAL at 21:50

## 2024-01-01 RX ADMIN — INSULIN HUMAN 8 UNITS: 100 INJECTION, SOLUTION PARENTERAL at 12:49

## 2024-01-01 RX ADMIN — ATORVASTATIN CALCIUM 40 MG: 20 TABLET, FILM COATED ORAL at 21:29

## 2024-01-01 RX ADMIN — POTASSIUM CHLORIDE 40 MEQ: 1.5 POWDER, FOR SOLUTION ORAL at 12:31

## 2024-01-01 RX ADMIN — ACETAMINOPHEN 325MG 650 MG: 325 TABLET ORAL at 20:57

## 2024-01-01 RX ADMIN — CLOPIDOGREL BISULFATE 75 MG: 75 TABLET, FILM COATED ORAL at 08:54

## 2024-01-01 RX ADMIN — INSULIN HUMAN 4 UNITS: 100 INJECTION, SOLUTION PARENTERAL at 08:08

## 2024-01-01 RX ADMIN — METOPROLOL TARTRATE 12.5 MG: 25 TABLET, FILM COATED ORAL at 09:35

## 2024-01-01 RX ADMIN — FAMOTIDINE 20 MG: 10 INJECTION INTRAVENOUS at 08:03

## 2024-01-01 RX ADMIN — INSULIN HUMAN 4 UNITS: 100 INJECTION, SOLUTION PARENTERAL at 20:58

## 2024-01-01 RX ADMIN — ASPIRIN 81 MG: 81 TABLET, CHEWABLE ORAL at 08:54

## 2024-01-01 RX ADMIN — PIPERACILLIN AND TAZOBACTAM 3.38 G: 3; .375 INJECTION, POWDER, LYOPHILIZED, FOR SOLUTION INTRAVENOUS at 02:55

## 2024-01-01 RX ADMIN — INSULIN GLARGINE 30 UNITS: 100 INJECTION, SOLUTION SUBCUTANEOUS at 11:06

## 2024-01-01 RX ADMIN — VENLAFAXINE HYDROCHLORIDE 37.5 MG: 75 TABLET ORAL at 08:52

## 2024-01-01 RX ADMIN — ATORVASTATIN CALCIUM 40 MG: 20 TABLET, FILM COATED ORAL at 21:34

## 2024-01-01 RX ADMIN — TORSEMIDE 40 MG: 20 TABLET ORAL at 08:29

## 2024-01-01 RX ADMIN — CHLORHEXIDINE GLUCONATE 15 ML: 1.2 RINSE ORAL at 09:39

## 2024-01-01 RX ADMIN — METOPROLOL TARTRATE 12.5 MG: 25 TABLET, FILM COATED ORAL at 08:08

## 2024-01-01 RX ADMIN — TORSEMIDE 40 MG: 20 TABLET ORAL at 11:05

## 2024-01-01 RX ADMIN — PIPERACILLIN AND TAZOBACTAM 3.38 G: 3; .375 INJECTION, POWDER, LYOPHILIZED, FOR SOLUTION INTRAVENOUS at 16:40

## 2024-01-01 RX ADMIN — POTASSIUM CHLORIDE 40 MEQ: 1.5 POWDER, FOR SOLUTION ORAL at 12:00

## 2024-01-01 RX ADMIN — TORSEMIDE 40 MG: 20 TABLET ORAL at 13:16

## 2024-01-01 RX ADMIN — VENLAFAXINE HYDROCHLORIDE 37.5 MG: 75 TABLET ORAL at 22:13

## 2024-01-01 RX ADMIN — ACETAMINOPHEN 325MG 650 MG: 325 TABLET ORAL at 11:51

## 2024-01-01 RX ADMIN — FAMOTIDINE 20 MG: 20 TABLET, FILM COATED ORAL at 08:53

## 2024-01-01 RX ADMIN — POTASSIUM CHLORIDE 40 MEQ: 750 TABLET, EXTENDED RELEASE ORAL at 04:21

## 2024-01-01 RX ADMIN — INSULIN GLARGINE 20 UNITS: 100 INJECTION, SOLUTION SUBCUTANEOUS at 09:13

## 2024-01-01 RX ADMIN — CHLORHEXIDINE GLUCONATE 15 ML: 1.2 RINSE ORAL at 22:12

## 2024-01-01 RX ADMIN — INSULIN GLARGINE 5 UNITS: 100 INJECTION, SOLUTION SUBCUTANEOUS at 11:27

## 2024-01-01 RX ADMIN — POTASSIUM CHLORIDE 20 MEQ: 1.5 FOR SOLUTION ORAL at 21:34

## 2024-01-01 RX ADMIN — HYDROCODONE BITARTRATE AND ACETAMINOPHEN 1 TABLET: 5; 325 TABLET ORAL at 17:50

## 2024-01-01 RX ADMIN — METOPROLOL TARTRATE 12.5 MG: 25 TABLET, FILM COATED ORAL at 09:39

## 2024-01-01 RX ADMIN — METOPROLOL TARTRATE 12.5 MG: 25 TABLET, FILM COATED ORAL at 22:11

## 2024-01-01 RX ADMIN — FAMOTIDINE 20 MG: 20 TABLET, FILM COATED ORAL at 10:49

## 2024-01-01 RX ADMIN — INSULIN HUMAN 4 UNITS: 100 INJECTION, SOLUTION PARENTERAL at 16:36

## 2024-01-01 RX ADMIN — ACETAMINOPHEN 325MG 650 MG: 325 TABLET ORAL at 22:26

## 2024-01-01 RX ADMIN — FUROSEMIDE 60 MG: 10 INJECTION, SOLUTION INTRAMUSCULAR; INTRAVENOUS at 10:30

## 2024-01-01 RX ADMIN — IPRATROPIUM BROMIDE AND ALBUTEROL SULFATE 3 ML: 2.5; .5 SOLUTION RESPIRATORY (INHALATION) at 11:34

## 2024-01-01 RX ADMIN — PERFLUTREN 3 ML: 6.52 INJECTION, SUSPENSION INTRAVENOUS at 12:53

## 2024-01-01 RX ADMIN — ATORVASTATIN CALCIUM 40 MG: 20 TABLET, FILM COATED ORAL at 21:31

## 2024-01-01 RX ADMIN — INSULIN GLARGINE 30 UNITS: 100 INJECTION, SOLUTION SUBCUTANEOUS at 08:54

## 2024-01-01 RX ADMIN — VENLAFAXINE HYDROCHLORIDE 37.5 MG: 75 TABLET ORAL at 20:54

## 2024-01-01 RX ADMIN — CLOPIDOGREL BISULFATE 75 MG: 75 TABLET, FILM COATED ORAL at 08:15

## 2024-01-01 RX ADMIN — IPRATROPIUM BROMIDE AND ALBUTEROL SULFATE 3 ML: 2.5; .5 SOLUTION RESPIRATORY (INHALATION) at 19:59

## 2024-01-01 RX ADMIN — CHLORHEXIDINE GLUCONATE 15 ML: 1.2 RINSE ORAL at 09:20

## 2024-01-01 RX ADMIN — PERFLUTREN 2 ML: 6.52 INJECTION, SUSPENSION INTRAVENOUS at 17:06

## 2024-01-01 RX ADMIN — INSULIN HUMAN 4 UNITS: 100 INJECTION, SOLUTION PARENTERAL at 16:44

## 2024-01-01 RX ADMIN — INSULIN HUMAN 8 UNITS: 100 INJECTION, SOLUTION PARENTERAL at 13:52

## 2024-01-01 RX ADMIN — INSULIN GLARGINE 30 UNITS: 100 INJECTION, SOLUTION SUBCUTANEOUS at 21:14

## 2024-01-01 RX ADMIN — FAMOTIDINE 20 MG: 20 TABLET, FILM COATED ORAL at 08:57

## 2024-01-01 RX ADMIN — POTASSIUM CHLORIDE 20 MEQ: 750 TABLET, EXTENDED RELEASE ORAL at 13:15

## 2024-01-01 RX ADMIN — INSULIN HUMAN 4 UNITS: 100 INJECTION, SOLUTION PARENTERAL at 06:43

## 2024-01-01 RX ADMIN — CASTOR OIL AND BALSAM, PERU 1 APPLICATION: 788; 87 OINTMENT TOPICAL at 21:05

## 2024-01-01 RX ADMIN — INSULIN GLARGINE 30 UNITS: 100 INJECTION, SOLUTION SUBCUTANEOUS at 09:36

## 2024-01-01 RX ADMIN — ENOXAPARIN SODIUM 30 MG: 100 INJECTION SUBCUTANEOUS at 20:41

## 2024-01-01 RX ADMIN — POTASSIUM CHLORIDE 10 MEQ: 7.46 INJECTION, SOLUTION INTRAVENOUS at 08:22

## 2024-01-01 RX ADMIN — CHLORHEXIDINE GLUCONATE 15 ML: 1.2 RINSE ORAL at 11:06

## 2024-01-01 RX ADMIN — ASPIRIN 81 MG: 81 TABLET, CHEWABLE ORAL at 08:15

## 2024-01-01 RX ADMIN — IPRATROPIUM BROMIDE AND ALBUTEROL SULFATE 3 ML: 2.5; .5 SOLUTION RESPIRATORY (INHALATION) at 10:54

## 2024-01-01 RX ADMIN — MILRINONE LACTATE 0.12 MCG/KG/MIN: 0.2 INJECTION, SOLUTION INTRAVENOUS at 13:15

## 2024-01-01 RX ADMIN — PIPERACILLIN AND TAZOBACTAM 3.38 G: 3; .375 INJECTION, POWDER, LYOPHILIZED, FOR SOLUTION INTRAVENOUS at 09:17

## 2024-01-01 RX ADMIN — INSULIN GLARGINE 25 UNITS: 100 INJECTION, SOLUTION SUBCUTANEOUS at 08:46

## 2024-01-01 RX ADMIN — CASTOR OIL AND BALSAM, PERU 1 APPLICATION: 788; 87 OINTMENT TOPICAL at 11:06

## 2024-01-01 RX ADMIN — POTASSIUM CHLORIDE 40 MEQ: 1.5 POWDER, FOR SOLUTION ORAL at 11:05

## 2024-01-01 RX ADMIN — INSULIN GLARGINE 30 UNITS: 100 INJECTION, SOLUTION SUBCUTANEOUS at 20:45

## 2024-01-01 RX ADMIN — IPRATROPIUM BROMIDE AND ALBUTEROL SULFATE 3 ML: 2.5; .5 SOLUTION RESPIRATORY (INHALATION) at 20:10

## 2024-01-01 RX ADMIN — CEFTRIAXONE SODIUM 1000 MG: 1 INJECTION, POWDER, FOR SOLUTION INTRAMUSCULAR; INTRAVENOUS at 14:14

## 2024-01-01 RX ADMIN — INSULIN HUMAN 4 UNITS: 100 INJECTION, SOLUTION PARENTERAL at 08:17

## 2024-01-01 RX ADMIN — Medication 10 ML: at 08:22

## 2024-01-01 RX ADMIN — ENOXAPARIN SODIUM 30 MG: 100 INJECTION SUBCUTANEOUS at 21:04

## 2024-01-01 RX ADMIN — INSULIN GLARGINE 20 UNITS: 100 INJECTION, SOLUTION SUBCUTANEOUS at 21:14

## 2024-01-01 RX ADMIN — VENLAFAXINE HYDROCHLORIDE 37.5 MG: 75 TABLET ORAL at 08:14

## 2024-01-01 RX ADMIN — ACETAMINOPHEN 325MG 650 MG: 325 TABLET ORAL at 08:15

## 2024-01-01 RX ADMIN — INSULIN HUMAN 8 UNITS: 100 INJECTION, SOLUTION PARENTERAL at 09:39

## 2024-01-01 RX ADMIN — METOPROLOL TARTRATE 12.5 MG: 25 TABLET, FILM COATED ORAL at 10:48

## 2024-01-01 RX ADMIN — CHLORHEXIDINE GLUCONATE 15 ML: 1.2 RINSE ORAL at 08:30

## 2024-01-01 RX ADMIN — IPRATROPIUM BROMIDE AND ALBUTEROL SULFATE 3 ML: 2.5; .5 SOLUTION RESPIRATORY (INHALATION) at 07:43

## 2024-01-01 RX ADMIN — VENLAFAXINE HYDROCHLORIDE 37.5 MG: 75 TABLET ORAL at 11:41

## 2024-01-01 RX ADMIN — INSULIN GLARGINE 20 UNITS: 100 INJECTION, SOLUTION SUBCUTANEOUS at 08:03

## 2024-01-01 RX ADMIN — INSULIN HUMAN 16 UNITS: 100 INJECTION, SOLUTION PARENTERAL at 17:09

## 2024-01-01 RX ADMIN — CASTOR OIL AND BALSAM, PERU 1 APPLICATION: 788; 87 OINTMENT TOPICAL at 22:04

## 2024-01-01 RX ADMIN — LOPERAMIDE HYDROCHLORIDE 2 MG: 2 CAPSULE ORAL at 18:46

## 2024-01-01 RX ADMIN — ATORVASTATIN CALCIUM 40 MG: 20 TABLET, FILM COATED ORAL at 20:38

## 2024-01-01 RX ADMIN — VENLAFAXINE HYDROCHLORIDE 37.5 MG: 75 TABLET ORAL at 08:53

## 2024-01-01 RX ADMIN — INSULIN HUMAN 4 UNITS: 100 INJECTION, SOLUTION PARENTERAL at 05:06

## 2024-01-01 RX ADMIN — ASPIRIN 81 MG: 81 TABLET, CHEWABLE ORAL at 09:36

## 2024-01-01 RX ADMIN — Medication 10 ML: at 20:57

## 2024-01-01 RX ADMIN — POTASSIUM CHLORIDE 40 MEQ: 1.5 POWDER, FOR SOLUTION ORAL at 05:53

## 2024-10-22 ENCOUNTER — ANESTHESIA (OUTPATIENT)
Dept: PERIOP | Facility: HOSPITAL | Age: 59
End: 2024-10-22
Payer: MEDICAID

## 2024-10-22 ENCOUNTER — ANESTHESIA EVENT (OUTPATIENT)
Dept: PERIOP | Facility: HOSPITAL | Age: 59
End: 2024-10-22
Payer: MEDICAID

## 2024-10-22 PROBLEM — I21.21 STEMI INVOLVING LEFT CIRCUMFLEX CORONARY ARTERY: Status: ACTIVE | Noted: 2024-10-22

## 2024-10-22 PROBLEM — I63.512 ACUTE ISCHEMIC LEFT MIDDLE CEREBRAL ARTERY (MCA) STROKE: Status: ACTIVE | Noted: 2024-10-22

## 2024-10-22 PROCEDURE — 25010000002 PHENYLEPHRINE 10 MG/ML SOLUTION: Performed by: ANESTHESIOLOGY

## 2024-10-22 PROCEDURE — 25010000002 PROPOFOL 10 MG/ML EMULSION: Performed by: ANESTHESIOLOGY

## 2024-10-22 PROCEDURE — 25010000002 SUCCINYLCHOLINE PER 20 MG: Performed by: ANESTHESIOLOGY

## 2024-10-22 PROCEDURE — 25010000002 ONDANSETRON PER 1 MG: Performed by: ANESTHESIOLOGY

## 2024-10-22 PROCEDURE — 25810000003 LACTATED RINGERS PER 1000 ML: Performed by: ANESTHESIOLOGY

## 2024-10-22 PROCEDURE — 25010000002 LABETALOL 5 MG/ML SOLUTION: Performed by: ANESTHESIOLOGY

## 2024-10-22 RX ORDER — SODIUM CHLORIDE, SODIUM LACTATE, POTASSIUM CHLORIDE, CALCIUM CHLORIDE 600; 310; 30; 20 MG/100ML; MG/100ML; MG/100ML; MG/100ML
INJECTION, SOLUTION INTRAVENOUS CONTINUOUS PRN
Status: DISCONTINUED | OUTPATIENT
Start: 2024-10-22 | End: 2024-10-22 | Stop reason: SURG

## 2024-10-22 RX ORDER — ONDANSETRON 2 MG/ML
INJECTION INTRAMUSCULAR; INTRAVENOUS AS NEEDED
Status: DISCONTINUED | OUTPATIENT
Start: 2024-10-22 | End: 2024-10-22 | Stop reason: SURG

## 2024-10-22 RX ORDER — LABETALOL HYDROCHLORIDE 5 MG/ML
INJECTION, SOLUTION INTRAVENOUS AS NEEDED
Status: DISCONTINUED | OUTPATIENT
Start: 2024-10-22 | End: 2024-10-22 | Stop reason: SURG

## 2024-10-22 RX ORDER — PROPOFOL 10 MG/ML
VIAL (ML) INTRAVENOUS AS NEEDED
Status: DISCONTINUED | OUTPATIENT
Start: 2024-10-22 | End: 2024-10-22 | Stop reason: SURG

## 2024-10-22 RX ORDER — ROCURONIUM BROMIDE 10 MG/ML
INJECTION, SOLUTION INTRAVENOUS AS NEEDED
Status: DISCONTINUED | OUTPATIENT
Start: 2024-10-22 | End: 2024-10-22 | Stop reason: SURG

## 2024-10-22 RX ORDER — SUCCINYLCHOLINE CHLORIDE 20 MG/ML
INJECTION INTRAMUSCULAR; INTRAVENOUS AS NEEDED
Status: DISCONTINUED | OUTPATIENT
Start: 2024-10-22 | End: 2024-10-22 | Stop reason: SURG

## 2024-10-22 RX ORDER — PHENYLEPHRINE HYDROCHLORIDE 10 MG/ML
INJECTION INTRAVENOUS AS NEEDED
Status: DISCONTINUED | OUTPATIENT
Start: 2024-10-22 | End: 2024-10-22 | Stop reason: SURG

## 2024-10-22 RX ORDER — CALCIUM CHLORIDE 100 MG/ML
INJECTION INTRAVENOUS; INTRAVENTRICULAR AS NEEDED
Status: DISCONTINUED | OUTPATIENT
Start: 2024-10-22 | End: 2024-10-22 | Stop reason: SURG

## 2024-10-22 RX ADMIN — SODIUM CHLORIDE, POTASSIUM CHLORIDE, SODIUM LACTATE AND CALCIUM CHLORIDE: 600; 310; 30; 20 INJECTION, SOLUTION INTRAVENOUS at 14:35

## 2024-10-22 RX ADMIN — ROCURONIUM BROMIDE 45 MG: 10 INJECTION, SOLUTION INTRAVENOUS at 14:53

## 2024-10-22 RX ADMIN — ROCURONIUM BROMIDE 5 MG: 10 INJECTION, SOLUTION INTRAVENOUS at 14:43

## 2024-10-22 RX ADMIN — PHENYLEPHRINE HYDROCHLORIDE 100 MCG: 10 INJECTION INTRAVENOUS at 15:43

## 2024-10-22 RX ADMIN — SUCCINYLCHOLINE CHLORIDE 200 MG: 20 INJECTION, SOLUTION INTRAMUSCULAR; INTRAVENOUS; PARENTERAL at 14:44

## 2024-10-22 RX ADMIN — ONDANSETRON 4 MG: 2 INJECTION INTRAMUSCULAR; INTRAVENOUS at 15:36

## 2024-10-22 RX ADMIN — CALCIUM CHLORIDE 1 G: 100 INJECTION, SOLUTION INTRAVENOUS at 14:48

## 2024-10-22 RX ADMIN — PHENYLEPHRINE HYDROCHLORIDE 200 MCG: 10 INJECTION INTRAVENOUS at 15:46

## 2024-10-22 RX ADMIN — PHENYLEPHRINE HYDROCHLORIDE 200 MCG: 10 INJECTION INTRAVENOUS at 15:44

## 2024-10-22 RX ADMIN — LABETALOL HYDROCHLORIDE 5 MG: 5 INJECTION, SOLUTION INTRAVENOUS at 15:39

## 2024-10-22 RX ADMIN — PHENYLEPHRINE HYDROCHLORIDE 500 MCG: 10 INJECTION INTRAVENOUS at 14:48

## 2024-10-22 RX ADMIN — PROPOFOL 150 MG: 10 INJECTION, EMULSION INTRAVENOUS at 14:44

## 2024-10-22 RX ADMIN — ROCURONIUM BROMIDE 20 MG: 10 INJECTION, SOLUTION INTRAVENOUS at 15:37

## 2024-10-22 NOTE — Clinical Note
First balloon inflation max pressure = 6 keon. First balloon inflation duration = 5 seconds. Second inflation of balloon - Max pressure = 6 keon. 2nd Inflation of balloon - Duration = 5 seconds. Third inflation of balloon - Max pressure = 6 keon. 3rd Inflation of balloon - Duration = 10 seconds. Fourth inflation of balloon - Max pressure = 8 keon. 4th Inflation of balloon - Duration = 10 seconds. 4th inflation was done at 12:24 EDT.

## 2024-10-22 NOTE — ANESTHESIA POSTPROCEDURE EVALUATION
Patient: Albina Sosa    Procedure Summary       Date: 10/22/24 Room / Location: [facility] OR [address] [place] [address] HYBRID OR    Anesthesia Start: 1435 Anesthesia Stop: 1610    Procedure: EMBOLECTOMY MECHANICAL Diagnosis:     Surgeons: Jeremiah Carey MD Provider: Bob Dc MD    Anesthesia Type: general ASA Status: 4 - Emergent            Anesthesia Type: general    Vitals  Vitals Value Taken Time   /95 10/22/24 1616   Temp     Pulse 85 10/22/24 1622   Resp     SpO2 99 % 10/22/24 1622   Vitals shown include unfiled device data.        Post Anesthesia Care and Evaluation    Patient location during evaluation: ICU  Patient participation: complete - patient cannot participate  Level of consciousness: obtunded/minimal responses  Pain management: adequate    Airway patency: patent  Anesthetic complications: No anesthetic complications  PONV Status: NA  Cardiovascular status: acceptable and hemodynamically stable  Respiratory status: acceptable, ETT, intubated and ventilator  Hydration status: acceptable

## 2024-10-22 NOTE — Clinical Note
First balloon inflation max pressure = 8 keon. First balloon inflation duration = 10 seconds. Second inflation of balloon - Max pressure = 16 keon. 2nd Inflation of balloon - Duration = 10 seconds.

## 2024-10-22 NOTE — ANESTHESIA PREPROCEDURE EVALUATION
Anesthesia Evaluation     Patient summary reviewed and Nursing notes reviewed   NPO Solid Status: > 8 hours  NPO Liquid Status: > 2 hours           Airway   Dental    (+) edentulous    Pulmonary    Cardiovascular     Rhythm: regular    (+) past MI     ROS comment: EF 10 %    Neuro/Psych  (+) CVA (cuyrrent problem)  GI/Hepatic/Renal/Endo      Musculoskeletal     Abdominal   (+) obese   Substance History      OB/GYN          Other                    Anesthesia Plan    ASA 4 - emergent     general     intravenous induction     Anesthetic plan, risks, benefits, and alternatives have been provided, discussed and informed consent has been obtained with: patient.    CODE STATUS:    Level Of Support Discussed With: Patient  Code Status (Patient has no pulse and is not breathing): CPR (Attempt to Resuscitate)  Medical Interventions (Patient has pulse or is breathing): Full Support

## 2024-10-22 NOTE — ED PROVIDER NOTES
" EMERGENCY DEPARTMENT ENCOUNTER  Room Number:  23/23  PCP: Germaine James APRN  Independent Historians: EMS and patient      HPI:  Chief Complaint: \"I feel like I got run over by a truck\"    A complete HPI/ROS/PMH/PSH/SH/FH are unobtainable due to: None    Chronic or social conditions impacting patient care (Social Determinants of Health): None      Context: Albina Sosa is a 59 y.o. female with a medical history of CAD and smoking as well as medication noncompliance who presents to the ED c/o acute dyspnea and chest discomfort that actually began 4 days ago was better for about a day before it recurred last night.  Finally she called EMS today.  She does admit she has not been faithful to all of her medications she was previously prescribed after a prior heart attack.  She is still smoking.  She reports some chest discomfort on the left it is nonradiating with associated shortness of breath.  Cough or fevers reported.      Review of prior external notes (non-ED) -and- Review of prior external test results outside of this encounter:  Currently unavailable      PAST MEDICAL HISTORY  Active Ambulatory Problems     Diagnosis Date Noted    No Active Ambulatory Problems     Resolved Ambulatory Problems     Diagnosis Date Noted    No Resolved Ambulatory Problems     No Additional Past Medical History         PAST SURGICAL HISTORY  No past surgical history on file.      FAMILY HISTORY  No family history on file.      SOCIAL HISTORY  Social History     Socioeconomic History    Marital status:          ALLERGIES  Bactrim [sulfamethoxazole-trimethoprim], Cefaclor, Flexeril [cyclobenzaprine], and Robaxin [methocarbamol]      REVIEW OF SYSTEMS  Review of Systems  Included in HPI  All systems reviewed and negative except for those discussed in HPI.      PHYSICAL EXAM    I have reviewed the triage vital signs and nursing notes.    ED Triage Vitals   Temp Heart Rate Resp BP SpO2   10/22/24 1139 10/22/24 1138 10/22/24 " 1138 10/22/24 1138 10/22/24 1138   98.2 °F (36.8 °C) 113 20 124/89 91 %      Temp src Heart Rate Source Patient Position BP Location FiO2 (%)   10/22/24 1139 -- 10/22/24 1138 10/22/24 1138 --   Oral  Lying Right arm        Physical Exam    Physical Exam   Constitutional: No distress.  Nontoxic.  Smell strongly of cigarette smoke.  HENT:  Head: Normocephalic and atraumatic.   Oropharynx: Mucous membranes are moist.   Eyes: . No scleral icterus. No conjunctival pallor.  Neck: Normal range of motion. Neck supple.   Cardiovascular: Pink warm and well perfused throughout.  Regular  Pulmonary/Chest: No respiratory distress.  No tachypnea or increased work of breathing appreciated.  Left greater than right basilar crackles.  Abdominal: Soft. There is no tenderness. There is no rebound and no guarding.  Benign  Musculoskeletal: Moves all extremities equally.  No calf tenderness  Neurological: Alert and oriented.  No acute focal deficit appreciated.  Skin: Skin is pink, warm, and dry.   Psychiatric: Mood and affect normal.   Nursing note and vitals reviewed.             LAB RESULTS  Recent Results (from the past 24 hours)   ECG 12 Lead Chest Pain    Collection Time: 10/22/24 11:37 AM   Result Value Ref Range    QT Interval 324 ms    QTC Interval 441 ms         RADIOLOGY  No Radiology Exams Resulted Within Past 24 Hours      MEDICATIONS GIVEN IN ER  Medications   sodium chloride 0.9 % flush 10 mL (has no administration in time range)         ORDERS PLACED DURING THIS VISIT:  Orders Placed This Encounter   Procedures    XR Chest 1 View    Comprehensive Metabolic Panel    Protime-INR    aPTT    Single High Sensitivity Troponin T    BNP    CBC Auto Differential    Monitor Blood Pressure    Continuous Pulse Oximetry    ECG 12 Lead Chest Pain    Insert Peripheral IV    CBC & Differential         OUTPATIENT MEDICATION MANAGEMENT:  Current Facility-Administered Medications Ordered in Epic   Medication Dose Route Frequency Provider  Last Rate Last Admin    sodium chloride 0.9 % flush 10 mL  10 mL Intravenous PRN August Olmedo MD         No current [facility]-ordered outpatient medications on file.         PROCEDURES  Procedures      Total critical care time: Approximately 20 minutes    Due to a high probability of clinically significant, life threatening deterioration, the patient required my highest level of preparedness to intervene emergently and I personally spent this critical care time directly and personally managing the patient. This critical care time included obtaining a history; examining the patient; vital sign monitoring; ordering and review of studies; arranging urgent treatment with development of a management plan; evaluation of patient's response to treatment; frequent reassessment; and, discussions with other providers.    This critical care time was performed to assess and manage the high probability of imminent, life-threatening deterioration that could result in multi-organ failure. It was exclusive of separately billable procedures and treating other patients and teaching time.    Please see MDM section and the rest of the note for further information on patient assessment and treatment.        PROGRESS, DATA ANALYSIS, CONSULTS, AND MEDICAL DECISION MAKING  All labs have been independently interpreted by me.  All radiology studies have been reviewed by me. All EKG's have been independently viewed and interpreted by me.  Discussion below represents my analysis of pertinent findings related to patient's condition, differential diagnosis, treatment plan and final disposition.    Differential diagnosis:   My differential diagnosis for chest pain includes but is not limited to:  Muscle strain, costochondritis, myositis, pleurisy, rib fracture, intercostal neuritis, herpes zoster, tumor, myocardial infarction, coronary syndrome, unstable angina, angina, aortic dissection, mitral valve prolapse, pericarditis, palpitations, pulmonary  embolus, pneumonia, pneumothorax, lung cancer, GERD, esophagitis, esophageal spasm      Clinical Scores:                  ED Course as of 10/22/24 1154   e Oct 22, 2024   1139 Team C [RS]   1139 CONSULT        Provider: Dr Rogerio SAMUEL    Discussion: Reviewed patient history, ED presentation evaluation.  He may be Dr. Martinez take care of the patient but she will pass along the message.  She agrees with plan to take patient direct to the Cath Lab.  No further medications requested at this time.    Agreeable c treatment and planned disposition.         [RS]   1146 EKG           EKG time: 1137  Rhythm/Rate: Sinus tachycardia, 110  P waves and HI: [place] within normal limits  QRS, axis: Narrow complex  ST and T waves: ST elevation in V5 and V6 concerning for AMI    Interpreted Contemporaneously by me, independently viewed  Comparison: Not currently available   [RS]   1147 Prehospital EMS EKG           EKG time: 1110  Rhythm/Rate: Sinus, 110  P waves and HI: [place] within normal limits  QRS, axis: Borderline IVCD  ST and T waves: ST elevation in V5 and V6 with some concerning elevation in 2 3 and aVF    Interpreted Contemporaneously by me, independently viewed     [RS]   1148 RADIOLOGY      Study: Single view chest  Findings: Evidence of prior sternotomy; diffuse interstitial pattern concerning for pulmonary edema  I independently viewed and interpreted these images contemporaneously with treatment.    [RS]   1154 CONSULT        Provider: Dr. German SAMUEL    Discussion: Reviewed patient history, ED presentation evaluation.  He is ready for the patient in the Cath Lab.    Agreeable c treatment and planned disposition.         [RS]   1154 Care was slightly delayed because the patient is quite a vasculopath.  Ultimately they were able to place a left brachial ultrasound-guided peripheral venous access and the patient is now ready for Cath Lab. [RS]      ED Course User Index  [RS] August Olmedo MD         Prescription drug  monitoring program review:     AS OF 11:54 EDT VITALS:    BP - 124/89  HR - 109  TEMP - 98.2 °F (36.8 °C) (Oral)  O2 SATS - 96%    COMPLEXITY OF CARE  The patient requires admission.      DIAGNOSIS  Final diagnoses:   Acute ST elevation myocardial infarction (STEMI), unspecified artery   Smoker   H/O medication noncompliance   Acute pulmonary edema         DISPOSITION  ED Disposition       ED Disposition   Send to Cath Lab    Condition   --    Comment   --                  ADMISSION    Discussed treatment plan and reason for admission with pt/family and admitting physician.  Pt/family voiced understanding of the plan for admission for further testing/treatment as needed.       Please note that portions of this document were completed with a voice recognition program.    Note Disclaimer: At [facility], we believe that sharing information builds trust and better relationships. You are receiving this note because you recently visited [facility]. It is possible you will see health information before a provider has talked with you about it. This kind of information can be easy to misunderstand. To help you fully understand what it means for your health, we urge you to discuss this note with your provider.            August Olmedo MD  10/22/24 [id number]

## 2024-10-22 NOTE — CONSULTS
[facility]  [phone]  Dr. Se Avitia      Subjective   LOS: 0 days     59-year-old female male with coronary artery disease and history CABG and previous stent.  She apparently called EMS with ongoing chest discomfort which has apparently been going on for several days.  EKG with evidence for STEMI and she was taken to the Cath Lab.  She underwent primary PCI.  After the procedure she was somnolent.  Due to concern for acute stroke, a team D was called.  CTA of the head confirmed left M2 occlusion.  Patient was given TNK.  Subsequently taken for thrombectomy by Dr. Valladares.  With all this due to agitation and difficulty in getting imaging, she was intubated.  When she returns to the ICU she is currently paralyzed and on the ventilator.  She is subsequently completely unresponsive.    Past medical history includes a prior history of stroke, type 2 diabetes, hypertension, hyperlipidemia and apparently a history of methamphetamine use.  She is a current cigarette smoker with COPD.    From her previous medication list she is on metoprolol, Brilinta, fenofibrate, insulin, albuterol, amlodipine, aspirin, atorvastatin, baclofen, Vraylar, Advair, hydralazine, primidone, Effexor.    Albina Sosa  has no history on file for alcohol use.,  has no history on file for tobacco use.     Past Hx:  has no past medical history on file.  Surg Hx:  has no past surgical history on file.  FH: family history is not on file.  SH:  has no history on file for tobacco use, alcohol use, and drug use.    No medications prior to admission.     Allergies   Allergen Reactions    Bactrim [Sulfamethoxazole-Trimethoprim] Hives    Cefaclor Hives    Flexeril [Cyclobenzaprine] Hives    Robaxin [Methocarbamol] Unknown - High Severity       Review of Systems   Unable to perform ROS: Intubated       Vital Signs past 24hrs  BP range: BP: (117-124)/(76-89) 117/76  Pulse range: Heart Rate:  [] 90  Resp rate range: Resp:  [12-24]  15  Temp range: Temp (24hrs), Av.2 °F (36.8 °C), Min:98.2 °F (36.8 °C), Max:98.2 °F (36.8 °C)    Oxygen range: SpO2:  [91 %-96 %] 96 %; Flow (L/min) (Oxygen Therapy):  [2-15] 15;   Device (Oxygen Therapy): nonrebreather mask  54 kg (119 lb 0.8 oz); There is no height or weight on file to calculate BMI.  Net IO Since Admission: 800 mL [10/22/24 1614]      Mechanical Ventilator:     Adult female who is currently sedated, intubated and paralyzed.  Oral ET tube noted.  Pupils reactive.  Lungs reveal absent breath sounds in the left lung.  Right air entry diminished consistent with COPD.  Rhonchi present without active wheezing.  Heart examination S1-S2 present rhythm regular.  No actual murmurs at this time.  Extremities no edema.  Abdomen soft nontender bowel sounds present.  No liver spleen enlargement.  Patient is paralyzed neuroexam not possible.  No obvious lymphadenopathy.    Results Review:    I have reviewed the laboratory and imaging data from current admission. My annotations are as noted in assessment and plan.  Result Review:  I have personally reviewed the results from the time of this admission to 10/22/2024 16:14 EDT and agree with these findings:  [x]  Laboratory list / accordion  [x]  Microbiology  [x]  Radiology  []  EKG/Telemetry   []  Cardiology/Vascular   []  Pathology  []  Old records  []  Other:        Medication Review:  I have reviewed the current MAR. My annotations are as noted in assessment and plan.    sodium chloride, 100 mL/hr      Lines, Drains & Airways       Active LDAs       Name Placement date Placement time Site Days    Peripheral IV 10/22/24 1155 Anterior;Left;Upper Arm 10/22/24  1155  Arm  less than 1    Urethral Catheter Straight-tip;Silicone 16 Fr. 10/22/24  1553  -- less than 1    ETT  10/22/24  1445  created via procedure documentation  -- less than 1                  Isolation status: No active isolations    Dietary Orders (From admission, onward)       Start     Ordered     10/22/24 1613  NPO Diet NPO Type: Strict NPO  Diet Effective Now        Question:  NPO Type  Answer:  Strict NPO    10/22/24 1612                    Isolation:  No active isolations    PCCM Problems  Acute left MCA ischemic stroke, status post TNK and thrombectomy  Acute STEMI status post primary PCI  Acute respiratory failure requiring invasive mechanical ventilation  CAD with history CABG and prior stents  Ischemic cardiomyopathy with EF 10%  Uncontrolled hyperglycemia with blood sugar very high  Type 2 diabetes mellitus  COPD  Current cigarette smoker  Obesity  Hyponatremia  Elevated liver enzymes        Plan of Treatment  Ventilator settings adjusted.  Check ABG.    Right sided ET tube was pulled back.  Now with bilateral breath sounds.  Chest x-ray confirms.    COPD with decreased breath sounds.  Nebs added.  Currently wheezing is not appreciated.  Monitor for same.    Acute left MCA ischemic stroke, status post thrombectomy.  Await clearance of paralytics to assess neurofunction.    Acute STEMI and status post primary PCI.  Currently aspirin and Plavix on hold.    Uncontrolled hyperglycemia with blood sugar very high.  Check labs and determine if patient is in DKA.  For now insulin drip.    Hyponatremia on labs this morning.  Could be related to extremely high blood sugar.  Recheck labs now.    Elevated liver enzymes and will monitor.    Patient will be counseled to quit smoking prior to discharge.    Check UDS is previously reported methamphetamine use.    I spent 35 mins critical care time in care of this patient outside of any procedures and not overlapping with any other provider.     Electronically signed by Se Avitia MD, 10/22/24, 4:14 PM EDT.      Part of this note may be an electronic transcription/translation of spoken language to printed text using the Dragon Dictation System.

## 2024-10-22 NOTE — H&P
ADVISED   [facility] History and Physical       Encounter Date:10/22/24  Patient:Albina Sosa  :1965  MRN:[id number]    Date of Admission: 10/22/2024  Date of Encounter Visit: 10/22/24  Encounter Provider: Bret Lawrence MD  Place of Service: [facility]  Patient Care Team:  Germaine James APRN as PCP - General (Nurse Practitioner)      Chief Complaint: Chest pain      History of Presenting Illness:      Ms. Sosa is a 59 y.o. woman with past medical history notable for coronary disease status post LIMA to LAD percutaneous interventions to her circumflex and left-sided PDA, hypertension, mixed hyperlipidemia, diabetes type 2 on insulin therapy, history of stroke, history of methamphetamine use, and ongoing tobacco abuse with underlying lung disease who called EMS today after days of chest discomfort.  EMS arrived there is concerns for STEMI by the time she arrived to emergency room EKG clearly demonstrated STEMI she was hypoxic requiring oxygen.  Chest x-ray looks wet she had rales.  She was brought emergently to the Cath Lab where she was found to have multivessel disease culprit was felt to be a acute marginal branch stenoses.  She underwent balloon angioplasty and stent placement.  After we are finishing the case patient was very somnolent she was reversed with flumazenil and Narcan that point she became more agitated had nonspecific neuro findings and changes.  We are able to get NG tube down but she was too agitated to keep it down.  We had concerns about her neurologic status she did have a stroke when she had a cath in  according to records from [facility].  Neurology was also consulted with that stat code stroke to help assist.    She came to the lab she was more alert and talking fairly coherently and gave history to our ER staff as well.      Review of Systems:  Review of Systems   Unable to perform ROS: Acuity of condition       Medications:  Prior to  Admission medications    Not on File       Allergies   Allergen Reactions    Bactrim [Sulfamethoxazole-Trimethoprim] Hives    Cefaclor Hives    Flexeril [Cyclobenzaprine] Hives    Robaxin [Methocarbamol] Unknown - High Severity       No past medical history on file.    No past surgical history on file.    Social History     Socioeconomic History    Marital status:        No family history on file.      The following portions of the patient's history were reviewed and updated as appropriate: allergies, current medications, past family history, past medical history, past social history, past surgical history and problem list.         Objective:      Temp:  [98.2 °F (36.8 °C)] 98.2 °F (36.8 °C)  Heart Rate:  [109-113] 109  Resp:  [12-24] 15  BP: (124)/(89) 124/89   No intake or output data in the 24 hours ending 10/22/24 1324  There is no height or weight on file to calculate BMI.      10/22/24  1214   Weight: 54 kg (119 lb 0.8 oz)           Physical Exam:  Constitutional: Well appearing, well developed, no acute distress   HENT: Oropharynx clear and membrane moist  Eyes: Normal conjunctiva, no sclera icterus.  Neck: Supple, no carotid bruit bilaterally.  Cardiovascular: Regular and Tachycardia rate and rhythm, No Murmur, Trace bilateral lower extremity edema.  Pulmonary: Normal respiratory effort, coarse sounds, no wheezing.  Abdominal: Soft, nontender, no hepatosplenomegaly, liver is non-pulsatile.  Neurological: Initially was conversant after cath hard to obtain any information no focal changes but more diffuse nonspecific following commands.   Skin: Warm, dry, no ecchymosis, no rash.  Psych: Unable to assess        Lab Review:   Results from last 7 days   Lab Units 10/22/24  1150   SODIUM mmol/L 125*   POTASSIUM mmol/L 5.1   CHLORIDE mmol/L 86*   CO2 mmol/L 24.3   BUN mg/dL 22*   CREATININE mg/dL 1.35*   GLUCOSE mg/dL 803*   CALCIUM mg/dL 10.2   AST (SGOT) U/L 80*   ALT (SGPT) U/L 41*     Results from last 7  "days   Lab Units 10/22/24  1150   HSTROP T ng/L 3,432*     Results from last 7 days   Lab Units 10/22/24  1150   WBC 10*3/mm3 13.48*   HEMOGLOBIN g/dL 15.9   HEMATOCRIT % 48.4*   PLATELETS 10*3/mm3 363     Results from last 7 days   Lab Units 10/22/24  1150   INR  1.02   APTT seconds 22.4*               Invalid input(s): \"LDLCALC\"  The ASCVD Risk score ([place] JEFFERY, et al., 2019) failed to calculate for the following reasons:    Risk score cannot be calculated because patient has a medical history suggesting prior/existing ASCVD     Results from last 7 days   Lab Units 10/22/24  1150   PROBNP pg/mL 26,236.0*           ECG 10/22/2024 tracings reviewed myself:  Sinus tachycardia lateral ST elevations with reciprocal changes noted    Cardiac catheterization 10/22/2024:  Severe multivessel coronary artery disease with 100% ostial LAD, circumflex contains 100% stenosis of the mid marginal branch at the distal stent edge likely culprit for STEMI presentation, left-sided PDA from the circumflex has 100% proximal segment stenoses with left to left collaterals from a LIMA to LAD supplying the distal PDA, patent LIMA to LAD LAD is diffusely diseased small in caliber size with sequential 80% stenoses.  LVEDP of approximately 30 mmHg  Ejection fraction 10%  Successful PCI to mid marginal with placement of a 2.25 x 12 mm Xience drug-eluting stent deployed initially at 8 keon with her stent balloon back slightly postdilated to 16 keon          Assessment:           STEMI involving left circumflex coronary artery         Plan:       Ms. Sosa is a 59 y.o. woman with past medical history notable for coronary disease status post LIMA to LAD percutaneous interventions to her circumflex and left-sided PDA, hypertension, mixed hyperlipidemia, diabetes type 2 on insulin therapy, history of stroke, history of methamphetamine use, and ongoing tobacco abuse with underlying lung disease who called EMS today after days of chest discomfort with " concerns over a lateral STEMI.  She is found to have multivessel disease severe cardiomyopathy and culprit of a distal marginal branch.  Underwent PCI to a marginal branch.  Also has diffusely diseased LAD and PDA with collaterals from the diffusely diseased LAD filling the PDA.  EF was around 10% on left ventricular gram no obvious thrombus noted.  Hemodynamically she remained stable.  LVEDP was elevated she was given Lasix.  Her new neurologic status is concerning obviously with her EF of 10% this could be a possible source if there is a layered thrombus.  Her presentation seems a mixture of both chronic disease but also small subacute presentation with a small mid marginal branch being affected.  Stat neuro consult was obtained.  We were able to advance an NG tube to load her with aspirin and Plavix in the Cath Lab but due to agitation this was removed.  Did require sedation in the Cath Lab as well this did improve her mentation as she was fairly obtunded prior to this but again was having very concerning neurologic findings given her history and comorbidities neurology was seeing her emergently prior to disposition to the intensive care unit    STEMI;  Status post aspirin and clopidogrel load in the Cath Lab via NG tube  Will hopefully get on statin  Would hold off on beta-blocker given severe decompensated cardiomyopathy  Echocardiogram to further evaluate  Cardiac rehab referral placed    Concerns for stroke:  At neuro consult from the Cath Lab emergent evaluation ongoing    Cardiomyopathy, ischemic:  Patient with severe ischemic cardiomyopathy ejection fraction 10% on clear the duration  Will work on getting records from [facility] she had an echo there.  Would hold off on beta-blocker and ARB given decompensated state  80 of IV Lasix given here    Diabetes type 2:   Elevated blood sugars noted but no evidence of acidosis think it is more of a hyperglycemia  Sliding scale insulin placed  Appreciate critical  care assistance with managing her severe hyperglycemia    Acute hyponatremia:  This was noted during her last hospitalization was resolved in the setting of elevated blood sugars will monitor  80 of IV Lasix given due to pulmonary edema  Close monitoring of sodium will be needed               Bret Lawrence MD  [facility]  10/22/24  13:24 EDT

## 2024-10-22 NOTE — CONSULTS
Neurology Consult Note    Consult Date: 10/22/2024    Referring MD: No ref. provider found    Reason for Consult I have been asked to see the patient in neurological consultation to render advice and opinion regarding stroke.     Albina Sosa is a 59 y.o. female with PMH of CAD, smoking, medication non-compliance, meth abuse, HTN, HLD, AMI, type 2 diabetes on insulin, called EMS today for chest discomfort. EKG showed acute STEMI on arrival and was hypoxic. Chest x-ray looks wet she had rales. She was brought emergently to the Cath Lab where she was found to have multivessel disease culprit was felt to be a acute marginal branch stenoses. She underwent balloon angioplasty and stent placement. After we are finishing the case patient was very somnolent she was reversed with flumazenil and Narcan that point she became more agitated had right sided weakness and aphasia. She also is preferring to look to the left. She has slightly decreased sensation in right arm and leg and not blinking to threat on the right. Concern for left MCA syndrome. Patient sent for team D and neurology consulted for the same. CTH demonstrates no acute hemorrhage or hypodensity. CTA head and neck unable to be performed secondarily to agitiation. She was given Ativan 1 mg which did not help followed by zyprexa 10 mg. Patient given TNK. Decided to send patient for angiogram due to concern for left MCA syndrome. Patient last known well 1202. She got heparin injection of 7000 units at 1220. She is not on anticoagulation at baseline. Patient apparently had AMI in 2022 and following procedure also had a stroke per nursing staff. Blood pressure 117/76 mmHg.     Past Medical/Surgical Hx:  No past medical history on file.  No past surgical history on file.    Medications On Admission  No medications prior to admission.       Allergies:  Allergies   Allergen Reactions    Bactrim [Sulfamethoxazole-Trimethoprim] Hives    Cefaclor Hives    Flexeril  [Cyclobenzaprine] Hives    Robaxin [Methocarbamol] Unknown - High Severity       Social Hx:  Social History     Socioeconomic History    Marital status:        Family Hx:  No family history on file.    Exam    /89 (BP Location: Right arm, Patient Position: Lying)   Pulse 109   Temp 98.2 °F (36.8 °C) (Oral)   Resp 15   Wt 54 kg (119 lb 0.8 oz)   SpO2 96%   gen: NAD, vitals reviewed  MS: agitated and oriented to self only, unintelligible speech, impaired fluency, comprehension and repetition, no neglect  CN: visual acuity grossly normal, not blinking to threat on the right visual fields, PERRL, EOMI, mild facial droop, hearing symmetric, palate elevates symmetrically, shoulder shrug equal, tongue midline  Motor: 5/5 left upper and lower extremity, not moving right upper or lower extremity, decreased tone in right upper and lower extremity  Sensation: grimaces slightly to pain on the right upper and lower extremity. Withdrawals in left upper and lower extremity.   Coordination: WEST due to aphasia  Gait: WEST     DATA:    Lab Results   Component Value Date    GLUCOSE 803 (C) 10/22/2024    CALCIUM 10.2 10/22/2024     (L) 10/22/2024    K 5.1 10/22/2024    CO2 24.3 10/22/2024    CL 86 (L) 10/22/2024    BUN 22 (H) 10/22/2024    CREATININE 1.35 (H) 10/22/2024    EGFRIFAFRI >60 02/03/2022    BCR 16.3 10/22/2024    ANIONGAP 14.7 10/22/2024     Lab Results   Component Value Date    WBC 13.48 (H) 10/22/2024    HGB 15.9 10/22/2024    HCT 48.4 (H) 10/22/2024    MCV 89.8 10/22/2024     10/22/2024     Lab Results   Component Value Date    LDL UNABLE TO CALCULATE 07/20/2022     (H) 06/23/2022     (H) 11/11/2019     Lab Results   Component Value Date    HGBA1C 8.5 (H) 07/20/2022     Lab Results   Component Value Date    INR 1.02 10/22/2024    INR 1.0 02/01/2022    INR 0.9 04/06/2020    PROTIME 13.6 10/22/2024    PROTIME 10.8 02/01/2022    PROTIME 10.0 (L) 04/06/2020       Lab review:   Na  125  BUN 22  Creatinine 1.35  eGFR 45.6  Glucose 803  Alk phos 194  AST 80  ALT 41    WBC 13.48  HgB 15.9    PTT: 133.1      Imaging review:   CTH no acute hemorrhage or hypodensity    Diagnoses:  Right hemiplegia  Right facial droop  Severe mixed aphasia  Hyperglycemia, treat will defer management to primary care team  CAD s/p stent  Tobacco abuse  HTN  HLD    NIHSS: 22      PLAN:   -Pt was a candidate for tnk given.  -Received tnk at 1420.  -Pt was a candidate for thrombectomy given   -Admit to ICU  -Maintain BP <180/105 if no contraindication (until after thrombectomy is performed will require BP most likely less than 140 systolic BP, will defer to neuroradiologist dependent TICI score), can give cardene if needed to maintain blood pressure  -Perform bedside swallow test  -Telemetry to monitor for arrhythmia  -sequential pressure device for vte prophylaxis  -Neuro checks, if change in exam call neuro oncall  -PT/OT when appropriate   -No antiplatelets or anticoagulants for 24 hours.  -Repeat Head CT in 6 hours and 24 hours  -MRI brain without contrast  -Hemoglobin A1c and lipid panel  -Atorvastatin 80 mg   -Treat hyperglycemia    Recommendations discussed with Dr. Robert who is in agreement with plan.

## 2024-10-22 NOTE — NURSING NOTE
Pre-procedure NIH: 22  Decision time: 1428  In room : 1432  Procedure start: 1453  Arterial access Puncture time: 1454  Guide catheter placement time: 1500  First thrombectomy device placement time (first pass):  1509  Subsequent device placement time: 1519  Time of recanalization:  1521  Final TICI Flow: 2C  Procedure End time: 1535    **These values were verbally verified with physician performing procedure.

## 2024-10-22 NOTE — CODE DOCUMENTATION
"Patient Name:  Albina Sosa  YOB: 1965  MRN:  [id number]  Admit Date:  10/22/2024    Visit Diagnoses:     ICD-10-CM ICD-9-CM   1. Acute ST elevation myocardial infarction (STEMI), unspecified artery  I21.3 410.90   2. Smoker  F17.200 305.1   3. H/O medication noncompliance  Z91.148 V15.81   4. Acute pulmonary edema  J81.0 518.4       Reason For Rapid:    neuro change post cath lab stent to LCx    RN Communicated With:  Dr. Lawrence; Dr. Robert w/ stroke pager      Rapid Outcome:  TNK given; pt delivered to OR for cerebral angiogram; pt to get ICU bed post-op    Communication From Rapid Team:   Complete right hemiplegia, dysarthria and aphasia post cath lab intervention for STEMI. Pt taken to CT scanner for team D imaging. Issues w/ pt agitation and delirium, failed conventional calming methods. TNK given. Zyprexa ordered, but as not immediately available was not given. Pt taken stat to OR for cerebral angiogram. Dr. Lawrence updated  via phone of stroke and cath lab procedure.       Most Recent Vital Signs  Temp:  [98.2 °F (36.8 °C)] 98.2 °F (36.8 °C)  Heart Rate:  [109-113] 109  Resp:  [12-24] 15  BP: (124)/(89) 124/89  SpO2:  [91 %-96 %] 96 %  on  Flow (L/min) (Oxygen Therapy):  [2-15] 15;   Device (Oxygen Therapy): nonrebreather mask    Labs:      No results found for: \"POCGLU\"  No results found for: \"SITE\", \"ALLENTEST\", \"PHART\", \"YYA1PQB\", \"PO2ART\", \"DII0ALO\", \"BASEEXCESS\", \"A5KGFOQL\", \"HGBBG\", \"HCTABG\", \"OXYHEMOGLOBI\", \"METHHGBN\", \"CARBOXYHGB\", \"CO2CT\", \"BAROMETRIC\", \"MODALITY\", \"FIO2\"  Results from last 7 days   Lab Units 10/22/24  1150   WBC 10*3/mm3 13.48*   HEMOGLOBIN g/dL 15.9   PLATELETS 10*3/mm3 363     Results from last 7 days   Lab Units 10/22/24  1150   SODIUM mmol/L 125*   POTASSIUM mmol/L 5.1   CHLORIDE mmol/L 86*   CO2 mmol/L 24.3   BUN mg/dL 22*   CREATININE mg/dL 1.35*   GLUCOSE mg/dL 803*   ALBUMIN g/dL 3.8   BILIRUBIN mg/dL 0.6   ALK PHOS U/L 194*   AST (SGOT) U/L 80* " "  ALT (SGPT) U/L 41*   CrCl cannot be calculated (Unknown ideal weight.).  Results from last 7 days   Lab Units 10/22/24  1150   HSTROP T ng/L 3,432*   PROBNP pg/mL 26,236.0*         No results found for: \"STREPPNEUAG\", \"LEGANTIGENUR\"        NIH Stroke Scale:  Interval: baseline  1a. Level of Consciousness: 0-->Alert, keenly responsive  1b. LOC Questions: 2-->Answers neither question correctly  1c. LOC Commands: 2-->Performs neither task correctly  2. Best Gaze: 1-->Partial gaze palsy, gaze is abnormal in one or both eyes, but forced deviation or total gaze paresis is not present  3. Visual: 1-->Partial hemianopia  4. Facial Palsy: 1-->Minor paralysis (flattened nasolabial fold, asymmetry on smiling)  5a. Motor Arm, Left: 0-->No drift, limb holds 90 (or 45) degrees for full 10 secs  5b. Motor Arm, Right: 4-->No movement  6a. Motor Leg, Left: 1-->Drift, leg falls by the end of the 5-sec period but does not hit bed  6b. Motor Leg, Right: 4-->No movement  7. Limb Ataxia: 2-->Present in two limbs  8. Sensory: 1-->Mild-to-moderate sensory loss, patient feels pinprick is less sharp or is dull on the affected side, or there is a loss of superficial pain with pinprick, but patient is aware of being touched  9. Best Language: 2-->Severe aphasia, all communication is through fragmentary expression, great need for inference, questioning, and guessing by the listener. Range of information that can be exchanged is limited, listener carries burden of. . . (see row details)  10. Dysarthria: 2-->Severe dysarthria, patients speech is so slurred as to be unintelligible in the absence of or out of proportion to any dysphasia, or is mute/anarthric  11. Extinction and Inattention (formerly Neglect): 1-->Visual, tactile, auditory, spatial, or personal inattention or extinction to bilateral simultaneous stimulation in one of the sensory modalities    Total (NIH Stroke Scale): 21    Please refer to full rapid documentation on summary page " under Index / Code Timeline

## 2024-10-22 NOTE — Clinical Note
Pt unable to follow commands and exhibiting dysphagia post cath. Administered narcan and romazicon per MD after the procedure. Patient still unable to follow commands and dysphagia. MD made aware and gave verbal order to insert NG tube to administer oral medications ( and 600 plavix). Inserted the NG tube per MD order and administered 325 aspirin and 600 plavix via NG tube. The NG tube was then removed due to patient being extrem ely agitated and thrashing around/attempting to rip out the NG tube. MD returned to the room for further assessment and suggested we call code stroke. Code stroke was initiated.

## 2024-10-22 NOTE — BRIEF OP NOTE
EMBOLECTOMY MECHANICAL  Progress Note    Albina Sosa  10/22/2024    Pre-op Diagnosis:   Acute Stroke       Post-Op Diagnosis Codes:  Same    Procedure/CPT® Codes:      Procedure(s):  EMBOLECTOMY MECHANICAL    Surgeon(s):  Jeremiah Carey MD    Anesthesia: General    Staff:   Circulator: Renae Guzman RN; Braydon Davis RN; Debra Wood RN  Scrub Person: Genesis Stuart; Elena Deutsch  Vascular Radiology Technician: Ara Winslow, SAKINA; Marilynn Ordaz; Kailash Beavers         Estimated Blood Loss: 200ml    Urine Voided: * No values recorded between 10/22/2024  2:32 PM and 10/22/2024  3:35 PM *    Specimens:                None          Drains: * No LDAs found *    Findings: Inferior distal M2/M3 left MCA occlusion with successful suction thrombectomy after 2 passes. TICI 2c flow and no underlying stenosis. Official report to follow.        Complications: None encountered.          Jeremiah Carey MD     Date: 10/22/2024  Time: 15:40 EDT

## 2024-10-22 NOTE — ANESTHESIA PROCEDURE NOTES
Airway  Urgency: elective    Date/Time: 10/22/2024 2:45 PM  End Time:10/22/2024 2:45 PM  Airway not difficult    General Information and Staff    Patient location during procedure: OR  Anesthesiologist: Bob Dc MD    Indications and Patient Condition  Indications for airway management: airway protection    Preoxygenated: yes  Mask difficulty assessment: 0 - not attempted    Final Airway Details  Final airway type: endotracheal airway      Successful airway: ETT  Cuffed: yes   Successful intubation technique: direct laryngoscopy  Facilitating devices/methods: intubating stylet  Endotracheal tube insertion site: oral  Blade: Holder  Blade size: 2  ETT size (mm): 7.0  Cormack-Lehane Classification: grade I - full view of glottis  Placement verified by: chest auscultation and capnometry   Number of attempts at approach: 1  Assessment: lips, teeth, and gum same as pre-op

## 2024-10-23 NOTE — PROGRESS NOTES
[facility] NEUROSURGERY INTRACRANIAL PROGRESS NOTE    PATIENT IDENTIFICATION:   Name:  Albina Sosa      MRN:  [id number]     59 y.o.  female               CC: Acute left MCA occlusion postprocedure day #1 successful thrombectomy      Subjective     Interval History: Remains intubated, sedation is held.  No events overnight.    ROS:  Unobtainable    Objective     Vital signs in last 24 hours:  Temp:  [98.2 °F (36.8 °C)-102.1 °F (38.9 °C)] 102.1 °F (38.9 °C)  Heart Rate:  [] 116  Resp:  [12-26] 26  BP: ()/() 119/79  FiO2 (%):  [30 %-40 %] 30 %    Intake/Output this shift:  I/O this shift:  In: -   Out: 125 [Urine:125]    Intake/Output last 3 shifts:  I/O last 3 completed shifts:  In: 913 [I.V.:913]  Out: 1900 [Urine:1900]    LABS:  Results from last 7 days   Lab Units 10/23/24  0236 10/22/24  1646 10/22/24  1637 10/22/24  1150   WBC 10*3/mm3 14.22* 17.94*  --  13.48*   HEMOGLOBIN g/dL 14.8 15.2  --  15.9   HEMOGLOBIN, POC g/dL  --   --  16.6  --    HEMATOCRIT % 43.0 47.2*  --  48.4*   HEMATOCRIT POC %  --   --  49  --    PLATELETS 10*3/mm3 397 361  --  363       Results from last 7 days   Lab Units 10/23/24  0236 10/22/24  2253 10/22/24  1939 10/22/24  1150   SODIUM mmol/L 139  --  129* 125*   POTASSIUM mmol/L 3.5  --  4.3 5.1   CHLORIDE mmol/L 101  --  90* 86*   CO2 mmol/L 25.4  --  23.0 24.3   BUN mg/dL 21*  --  22* 22*   CREATININE mg/dL 1.54*  --  1.47* 1.35*   GLUCOSE mg/dL 169*   < > 699* 803*   CALCIUM mg/dL 11.0*  --  11.3* 10.2    < > = values in this interval not displayed.          IMAGING STUDIES:  XR Chest 1 View    Result Date: 10/23/2024  Decreased pulmonary vascular congestion.  This report was finalized on 10/23/2024 9:19 AM by Dr. Napoleon Vang M.D on Workstation: [id number]      CT Head Without Contrast    Result Date: 10/23/2024   1. This head CT without contrast is effectively a delayed postcontrast head CT as an arteriogram was performed immediately prior to this head CT.  This patient has a 10 x 6 x 13 mm old lacunar infarct extending from the mid right corona radiata region into the superior right putamen and a 5 mm old lacunar infarct in the posterior head of the right caudate nucleus and a 7 x 4 mm old lacunar infarct in the left side of the gloria. Given the fact that this is effectively a delayed postcontrast head CT, there is some residual circulating contrast in the cerebral arteries and the superior division of the left MCA M2 segment does not demonstrate enhancement whereas the inferior division of the left MCA does, however contralaterally the smaller diameter superior division right MCA does not enhance whereas the inferior division does.  This finding is nonspecific.  I cannot exclude an embolus. The results were discussed with Dr. Robert from stroke neurology while the patient was still on our scanner on 10/22/2024 at 1:53 p.m., and he requested a contrast-enhanced CT angiogram of the head and neck and CT perfusion study of the brain. However, the patient was unable to hold still for the contrast-enhanced CT angiogram of the head and neck and CT perfusion study and therefore no CTA and CTP was performed and the patient is being sent to the catheter lab for an arteriogram to evaluate the left internal carotid and left MCA for possible left MCA embolus in this patient who has acute right-sided weakness and slurred speech. Furthermore, one could get an MRI of the brain to further evaluate if there remains any clinical suspicion of acute stroke.  Radiation dose reduction techniques were utilized, including automated exposure control and exposure modulation based on body size.   This report was finalized on 10/23/2024 6:19 AM by Dr. Natalio Oviedo M.D on Workstation: [id number]      CT Head Without Contrast    Result Date: 10/23/2024  Electronically signed by Justyn Marti MD on 10-23-24 at 0503    MRI Brain Without Contrast    Result Date: 10/22/2024  1. Moderate to  large acute left middle cerebral artery distribution infarction with involvement of the left posterior left frontal lobe, left parietal lobe, left occipital lobe. Suspect small foci of petechial hemorrhage associated with the infarction without evidence for large hemorrhage or shift of the midline structures. 2. Small chronic lacunar infarctions within the right corona radiata, right gloria, posterior right caudate head.  Discussed with Sydney, the nurse caring for the patient in the ICU on 10/22/2024 at 6:40 p.m.  This report was finalized on 10/22/2024 7:06 PM by Napoleon Echevarria M.D on Workstation: [id number]      XR Chest 1 View    Result Date: 10/22/2024  1. ET tube tip is at the tapan and this could be withdrawn 3 cm for better position. 2. Mild changes of CHF with cardiomegaly and vascular engorgement and mild hydrostatic interstitial pulmonary edema.  This report was finalized on 10/22/2024 5:09 PM by Napoleon Echevarria M.D on Workstation: [id number]      XR Chest 1 View    Result Date: 10/22/2024   1. Suspect interstitial edema. 2. Possible airways disease. 3. Small bibasilar opacities  This report was finalized on 10/22/2024 12:01 PM by Dr. Justyn Vee M.D on Workstation: [id number]         I personally viewed and interpreted the patient's chart.    Meds reviewed/changed: Yes    Physical exam  Intubated, sedation held.  Not following any commands.  Dense right hemiparesis, moving the left upper and lower extremity.  Pupils equal round reactive to light  gait deferred  Bilateral groin sites flat, soft, no hematomas, pedal pulses present-vascular following      Assessment & Plan     ASSESSMENT:      STEMI involving left circumflex coronary artery    Acute ischemic left middle cerebral artery (MCA) stroke      The patient is a 59-year-old female who is status post acute STEMI, acute right MCA occlusion postprocedure day #1 successful thrombectomy.    PLAN:     Repeat CT this morning stable.  Neurology  "managing stroke.  Nothing further to add from a neurosurgical standpoint.  We will sign off but be available.  She can follow-up as needed.    I discussed the patient's findings and my recommendations with patient and nursing staff    I spent 35 minutes caring for Albina Sosa on this date of service. This time includes time spent by me in the following activities: preparing for the visit, reviewing tests, obtaining and/or reviewing a separately obtained history, performing a medically appropriate examination and/or evaluation, counseling and educating the patient/family/caregiver, ordering medications, tests, or procedures, referring and communicating with other health care professionals, documenting information in the medical record, independently interpreting results and communicating that information with the patient/family/caregiver, and care coordination          LOS: 1 day       Genesis Crocker, APRN  10/23/2024  11:31 EDT    \"Dictated utilizing Dragon dictation\".      "

## 2024-10-23 NOTE — NURSING NOTE
Patient new from OR post op thrombectomy, upon arrival to ICU NIH 32 paralytic not reversed. Critical lactic acid (Dr. Santiago aware), critical troponin (Dr. Lr aware). Down for stat MRI per neurology, shows acute infarct Dr. Taylor with neurology notified. Patient started on prop, had labile Bps requiring levo at one point. Bilateral pedal and dorsalis pulses unable to doppler, both cardiology Dr. Lr notified for R side and neurosurgery Rita CHAMBERLAIN notified for L side pulses. Per Rita CHAMBERLAIN consult vascular to see patient. Started on insulin gtt for hyperglycemia. Q1 blood glucose checks initiated. Had some bleeding from the mouth, Dr. Santiago aware and at bedside.

## 2024-10-23 NOTE — PROGRESS NOTES
"DOS: 10/23/2024  NAME: Albina Sosa   : 1965  PCP: Germaine James APRN  Chief Complaint   Patient presents with    Shortness of Breath    Chest Pain       Chief complaint: Close procedure left MCA syndrome  Subjective: This is a 59 years old white female with known diagnosis of polysubstance abuse, uncontrolled type 2 diabetes, CAD, medication noncompliance, hypertension, hyperlipidemia, insulin-dependent type 2 diabetes who presented initially to the ER for chest pain and shortness of breath workup found that she had acute myocardial infarction and she was taken urgently to the Cath Lab, postprocedure she suddenly became confused and she developed left MCA syndrome with right sided plegia and being aphasic her NIH score was 22, she was within the window for TNK which was given and she was taken urgently for mechanical thrombectomy status post successful procedure with tiki 2 C result.  After the procedure she was admitted to the ICU to manage her hyperglycemia and strictly manage her blood pressure, she had a stat MRI post the mechanical thrombectomy that showed left MCA stroke and suspected small foci of petechial hemorrhage without evidence of large hemorrhagic transformation.  She had a repeated CT scan of the head this morning which showed evolving left MCA stroke with some retained contrast, again no measurable intraparenchymal hematoma or midline shift.    Objective:  Vital signs: /79   Pulse 120   Temp (!) 102.1 °F (38.9 °C)   Resp 24   Ht 154.9 cm (61\")   Wt 56.5 kg (124 lb 9 oz)   SpO2 100%   BMI 23.54 kg/m²    Gen: Eyes closed, vitals reviewed  MS: oriented x0, did not follow commands, globally aphasic, unable to assess neglect.  CN: Does not blink to threat on the right, gaze midline VOR present, right lower facial droop, unable to assess dysarthria  Motor: Spontaneously moving the left side, plegic on the right, normal tone  Sensory: Withdrew to pain on the left, plegic on the " right    Laboratory results:  Lab Results   Component Value Date    GLUCOSE 169 (H) 10/23/2024    CALCIUM 11.0 (H) 10/23/2024     10/23/2024    K 3.5 10/23/2024    CO2 25.4 10/23/2024     10/23/2024    BUN 21 (H) 10/23/2024    CREATININE 1.54 (H) 10/23/2024    EGFRIFAFRI 69 06/09/2022    EGFRIFNONA 60 06/09/2022    BCR 13.6 10/23/2024    ANIONGAP 12.6 10/23/2024     Lab Results   Component Value Date    WBC 14.22 (H) 10/23/2024    HGB 14.8 10/23/2024    HCT 43.0 10/23/2024    MCV 86.7 10/23/2024     10/23/2024     Lab Results   Component Value Date     (H) 10/23/2024    LDL  11/21/2023      Comment:        LDL cholesterol not calculated. Triglyceride levels  greater than 400 mg/dL invalidate calculated LDL results.    Reference range: <100    Desirable range <100 mg/dL for primary prevention;    <70 mg/dL for patients with CHD or diabetic patients   with > or = 2 CHD risk factors.    LDL-C is now calculated using the Narciso-Ortiz   calculation, which is a validated novel method providing   better accuracy than the Friedewald equation in the   estimation of LDL-C.   Narciso SS et al. HIMA. 2013;310(19): 8073-6292   (http://education.gAuto.BorderJump/faq/OCR677)    LDL  07/18/2023      Comment:        LDL cholesterol not calculated. Triglyceride levels  greater than 400 mg/dL invalidate calculated LDL results.    Reference range: <100    Desirable range <100 mg/dL for primary prevention;    <70 mg/dL for patients with CHD or diabetic patients   with > or = 2 CHD risk factors.    LDL-C is now calculated using the Narciso-Ortiz   calculation, which is a validated novel method providing   better accuracy than the Friedewald equation in the   estimation of LDL-C.   Narciso SS et al. HIMA. 2013;310(19): 6494-0590   (http://education.gAuto.BorderJump/faq/TEB472)         Lab 10/23/24  0236   HEMOGLOBIN A1C 14.70*        Review of labs: A1c 14.7, , APTT this morning 23.6, lactate  improving 2.4 from 5.4 yesterday    Review and interpretation of imaging: I personally reviewed her repeated CT head this morning which showed evolving left MCA stroke with contrast staining, no hemorrhagic transformation or midline shift.    Diagnoses:  -Left MCA acute ischemic stroke cardioembolic status post TNK and mechanical thrombectomy  -Left M2 occlusion status post mechanical thrombectomy tiki2c  -Significant hyperglycemia on presentation  -Uncontrolled type 2 diabetes  -Meth abuse  -Recent myocardial infarction status post stenting  -Cardiomyopathy  -Heart failure with reduced ejection fraction  -Tobacco abuse      Comment: Overall her exam is stable compared to yesterday.    Plan:  1.  Okay for aspirin 81 mg daily, hold on antiplatelets until 24 hours post TNK CT head negative for bleeding.  2.  Stat 2D echo  3.  Goal LDL less than 70 currently at 150, Lipitor 80 mg daily  4.  Goal blood sugar less than 140, avoid hypoglycemia  5.  Goal blood pressure less than 140 systolic  6.  Keep on bedrest  7.  Goal A1c less than 7 currently at 14.7, consult diabetic educator when appropriate  8.  Patient is critically ill with high risk of complications  9.  Counsele regarding tobacco abuse and meth abuse when appropriate.    Management discussed with nursing staff, Dr. Avitia.

## 2024-10-23 NOTE — CONSULTS
Nutrition Services    Patient Name:  Albina Sosa  YOB: 1965  MRN: [id number]  Admit Date:  10/22/2024    Assessment Date:  10/23/24    Comment: Nutrition Consult   This is a 60 yo female admitted with STEMI involving left circumflex coronary artery, acute ischemic left middle cerebral artery stroke. Pt is on the vent and has a NG cortrak  Labs: glu 152, procal .41, A1C 14.70, chol 245, trig 311  No BM yet  Meds: Cardene held, Peridex, insulin, propofol on hold    Plan/Recommendation  TF's to begin with Diabetisource AC at 20ml/hr  Water flushes 30ml/hr  If pt unable to be wean, a TF goal rate is 55ml/hr  Will monitor labs, electrolytes to be replaced as needed  RD to follow    CLINICAL NUTRITION ASSESSMENT      Reason for Assessment Physician Consult, TF Assessment    Diagnosis/Problem    Medical & Surgical Hx No past medical history on file.    Past Surgical History:   Procedure Laterality Date    CARDIAC CATHETERIZATION N/A 10/22/2024    Procedure: Left Heart Cath;  Surgeon: Bret Lawrence MD;  Location: [facility] INVASIVE LOCATION;  Service: Cardiovascular;  Laterality: N/A;    CARDIAC CATHETERIZATION N/A 10/22/2024    Procedure: Stent LAM coronary;  Surgeon: Bret Lawrence MD;  Location: [facility] CATH INVASIVE LOCATION;  Service: Cardiovascular;  Laterality: N/A;    CARDIAC CATHETERIZATION N/A 10/22/2024    Procedure: Percutaneous Coronary Intervention;  Surgeon: Bret Lawrence MD;  Location: [facility] INVASIVE LOCATION;  Service: Cardiovascular;  Laterality: N/A;    CARDIAC CATHETERIZATION N/A 10/22/2024    Procedure: Coronary angiography;  Surgeon: Bret Lawrence MD;  Location: [facility] CATH INVASIVE LOCATION;  Service: Cardiovascular;  Laterality: N/A;    EMBOLECTOMY N/A 10/22/2024    Procedure: EMBOLECTOMY MECHANICAL;  Surgeon: Jeremiah Carey MD;  Location: [facility] HYBRID OR;  Service: Interventional Radiology;  Laterality: N/A;          Encounter Information       "  Nutrition History    Food Preferences    Supplements    Factors Affecting Intake altered respiratory status   Tests/Procedures Other: ECHO     Anthropometrics        Current Height   Current Weight  BMI kg/m2 Height: 154.9 cm (61\")  Weight: 56.2 kg (124 lb) (10/23/24 1252)  Body mass index is 23.43 kg/m².     Adj BMI (if applicable)    BMI Category Normal/Healthy (18.4 - 24.9)       Admission Weight    Ideal Body Weight (IBW) 105lb     Adj IBW (if applicable)    Usual Body Weight (UBW) unknown   Weight Change/Trend Unknown       Weight history: Wt Readings from Last 30 Encounters:   10/23/24 1252 56.2 kg (124 lb)   10/23/24 0238 56.5 kg (124 lb 9 oz)   10/22/24 1700 54 kg (119 lb 0.8 oz)   10/22/24 1214 54 kg (119 lb 0.8 oz)        Estimated/Assessed Needs        Energy Requirements    Weight for Calculation 56.2   Method for Estimation  25-30 kcal/kg   EST Needs (kcal/day) 3323-1871       Protein Requirements    Weight for Calculation 56.2   EST Protein Needs (g/kg) 1.2 - 1.5 gm/kg   EST Daily Needs (g/day) 67-84       Fluid Requirements     Method for Estimation 1 mL/kcal    Estimated Needs (mL/day)      Labs        Pertinent Labs    Results from last 7 days   Lab Units 10/23/24  0236 10/22/24  2253 10/22/24  1939 10/22/24  1150   SODIUM mmol/L 139  --  129* 125*   POTASSIUM mmol/L 3.5  --  4.3 5.1   CHLORIDE mmol/L 101  --  90* 86*   CO2 mmol/L 25.4  --  23.0 24.3   BUN mg/dL 21*  --  22* 22*   CREATININE mg/dL 1.54*  --  1.47* 1.35*   CALCIUM mg/dL 11.0*  --  11.3* 10.2   BILIRUBIN mg/dL  --   --  0.7 0.6   ALK PHOS U/L  --   --  153* 194*   ALT (SGPT) U/L  --   --  39* 41*   AST (SGOT) U/L  --   --  138* 80*   GLUCOSE mg/dL 169* 419* 699* 803*     Results from last 7 days   Lab Units 10/23/24  0236 10/22/24  1939   HEMOGLOBIN g/dL 14.8  --    HEMATOCRIT % 43.0  --    WBC 10*3/mm3 14.22*  --    TRIGLYCERIDES mg/dL 311*  --    ALBUMIN g/dL  --  3.2*     Results from last 7 days   Lab Units 10/23/24  0843 " 10/23/24  0236 10/22/24  1722 10/22/24  1646 10/22/24  1410 10/22/24  1150   INR   --   --   --   --   --  1.02   APTT seconds 23.6  --  49.8*  --  133.1* 22.4*   PLATELETS 10*3/mm3  --  397  --  361  --  363     SARS-CoV-2, SALVADOR   Date Value Ref Range Status   04/29/2022 NEGATIVE Negative Final     Comment:     The 2019-CoV rRT-PCR Assay is only for use under a Food and Drug Administration Emergency Use Authorization. The performance characteristics of the assay were verified by the Clinical Laboratory at [facility]. Results should be used in   conjunction with the patient's clinical symptoms, medical history, and other clinical/laboratory findings to determine an overall clinical diagnosis. Negative results do not preclude infection with SARS-CoV-2 (COVID-19).    Test parameters have not been validated for screening asymptomatic patients.     Lab Results   Component Value Date    HGBA1C 14.70 (H) 10/23/2024          Medications            Scheduled Medications aspirin, 81 mg, Oral, Daily  atorvastatin, 40 mg, Oral, Nightly  chlorhexidine, 15 mL, Mouth/Throat, Q12H  [Held by provider] clopidogrel, 75 mg, Oral, Daily  [START ON 10/24/2024] famotidine, 20 mg, Intravenous, Daily  insulin glargine, 20 Units, Subcutaneous, Q12H  insulin regular, 4-24 Units, Subcutaneous, Q4H  piperacillin-tazobactam, 3.375 g, Intravenous, Q8H  sodium chloride, 10 mL, Intravenous, Q12H        Infusions niCARdipine, 5-15 mg/hr, Last Rate: Stopped (10/22/24 1832)  propofol, 5-50 mcg/kg/min, Last Rate: Stopped (10/23/24 0830)        PRN Medications   acetaminophen    Calcium Replacement - Follow Nurse / BPA Driven Protocol    dextrose    dextrose    fentaNYL citrate (PF) **OR** fentaNYL citrate (PF)    glucagon (human recombinant)    hydrALAZINE    HYDROcodone-acetaminophen    Magnesium Standard Dose Replacement - Follow Nurse / BPA Driven Protocol    nitroglycerin    OLANZapine    ondansetron ODT **OR** ondansetron     Phosphorus Replacement - Follow Nurse / BPA Driven Protocol    Potassium Replacement - Follow Nurse / BPA Driven Protocol    Potassium Replacement - Follow Nurse / BPA Driven Protocol    [COMPLETED] Insert Peripheral IV **AND** sodium chloride    sodium chloride     Physical Findings          Physical Appearance ventilator support   Oral/Mouth Cavity tooth or teeth missing   Edema  no edema   Gastrointestinal hypoactive bowel sounds   Skin  other: left/right groin wound   Tubes/Drains/Lines Cortrak, NG tube   NFPE Not indicated at this time   --  Current Nutrition Orders & Evaluation of Intake       Oral Nutrition     Food Allergies NKFA   Current PO Diet NPO Diet NPO Type: Tube Feeding   Supplement    PO Evaluation     Trending % PO Intake    --  Nutrition Diagnosis        Nutrition Dx Problem 1 Problem: Inadequate Oral Intake  Etiology: Medical Diagnosis - respiratory failure  vent  Signs/Symptoms: NPO and Report/Observation    Comment:      INTERVENTION / PLAN OF CARE  Intervention Goal        Intervention Goal(s) Reduce/improve symptoms, Disease management/therapy, Initiate TF/PN, Tolerate TF/PN at goal, Transition TF to PO, and No significant weight loss     Nutrition Intervention        RD Action Continue to monitor, Care plan reviewed, and Recommend/order: TF's     Prescription         Diet     Supplement/Snack    EN/PN     Prescription Ordered       Enteral Prescription:     Enteral Route NG    TF Delivery Method Continuous    Enteral Product Diabetisource AC    Modular None    Propofol Rate/Kcal On hold    TF Start Rate 20    TF Goal Rate 55    Free Water Flush [id number]    TF Provision at Goal: 1452 kcal, 72 gm protein, 992 mL free water + 180 mL water flushes         Calories 100 % needs met         Protein  100 % needs met         Fluid (mL)     Prescription Ordered Yes     Monitor/Evaluation        Monitor Per protocol, I&O, Pertinent labs, EN delivery/tolerance, Weight, Skin status, GI status, Symptoms    Discharge Plan Pending clinical course   Education Education not appropriate at this time     RD to follow per protocol.       Electronically signed by:  Gely Dalton RD  10/23/24 15:43 EDT

## 2024-10-23 NOTE — PROGRESS NOTES
[facility]  [phone]  Dr. Se Avitia    Subjective:  LOS: 1    Chief Complaint: Acute stroke    Patient is on the ventilator and poorly responsive.    Objective   Vital Signs past 24hrs  Temp range: Temp (24hrs), Av.6 °F (38.1 °C), Min:98.3 °F (36.8 °C), Max:103 °F (39.4 °C)    BP range: BP: ()/() 132/94  Pulse range: Heart Rate:  [] 102  Resp rate range: Resp:  [14-26] 22  Device (Oxygen Therapy): ventilator   Oxygen range:SpO2:  [96 %-100 %] 99 %   Mechanical Ventilator:Mode: PC/AC (10/23/24 1445)    Physical Exam  Constitutional:       Interventions: She is sedated, intubated and restrained.   Cardiovascular:      Rate and Rhythm: Normal rate and regular rhythm.      Heart sounds: No murmur heard.  Pulmonary:      Effort: She is intubated.      Breath sounds: Decreased breath sounds and rhonchi (few) present.   Abdominal:      General: Bowel sounds are normal.      Palpations: Abdomen is soft.      Tenderness: There is no abdominal tenderness.   Musculoskeletal:         General: No swelling.   Neurological:      Comments: Right hemiplegia       Results Review:    I have reviewed the laboratory and imaging data since the last note by [facility] physician.  My annotations are noted in assessment and plan.      Result Review:  I have personally reviewed the results from last note by [facility] physician to 10/23/2024 15:39 EDT and agree with these findings:  [x]  Laboratory list / accordion  [x]  Microbiology  [x]  Radiology  []  EKG/Telemetry   []  Cardiology/Vascular   []  Pathology  []  Old records  []  Other:      Medication Review:  I have reviewed the current MAR.  My annotations are noted in assessment and plan.    aspirin, 81 mg, Oral, Daily  atorvastatin, 40 mg, Oral, Nightly  chlorhexidine, 15 mL, Mouth/Throat, Q12H  [Held by provider] clopidogrel, 75 mg, Oral, Daily  [START ON 10/24/2024] famotidine, 20 mg, Intravenous, Daily  insulin glargine, 20 Units, Subcutaneous,  Q12H  insulin regular, 4-24 Units, Subcutaneous, Q4H  piperacillin-tazobactam, 3.375 g, Intravenous, Q8H  sodium chloride, 10 mL, Intravenous, Q12H        niCARdipine, 5-15 mg/hr, Last Rate: Stopped (10/22/24 1832)  propofol, 5-50 mcg/kg/min, Last Rate: Stopped (10/23/24 0830)      Lines, Drains & Airways       Active LDAs       Name Placement date Placement time Site Days    Peripheral IV 10/22/24 1155 Anterior;Left;Upper Arm 10/22/24  1155  Arm  1    Peripheral IV 10/22/24 1645 Anterior;Right 10/22/24  1645  --  less than 1    Peripheral IV 10/22/24 1645 Left;Posterior Forearm 10/22/24  1645  Forearm  less than 1    Urethral Catheter Straight-tip;Silicone 16 Fr. 10/22/24  1553  -- less than 1    ETT  10/22/24  1445  created via procedure documentation  -- 1                  Isolation status: No active isolations    Dietary Orders (From admission, onward)       Start     Ordered    10/22/24 2005  NPO Diet NPO Type: Strict NPO  Diet Effective Now        Comments: Should Remain in Effect Until a Complete SLP Evaluation Occurs & a Superceding Order is Received   Question:  NPO Type  Answer:  Strict NPO    10/22/24 2004    10/22/24 1634  Patient is on Glucommander  Continuous        Question Answer Comment   Tray Note: Glucommander    Patient is on Glucommander: Yes        10/22/24 1633                    PCCM Problems  Acute left MCA ischemic stroke, status post TNK and thrombectomy  Acute STEMI status post primary PCI  Acute respiratory failure requiring invasive mechanical ventilation  CAD with history CABG and prior stents  Ischemic cardiomyopathy with EF 10%  Uncontrolled hyperglycemia with blood sugar very high  Type 2 diabetes mellitus  COPD  Current cigarette smoker  Obesity  Hyponatremia  Elevated liver enzymes  NICHO  Fever  Positive UDS    Plan of Treatment    Acute left MCA stroke with right hemiplegia.  Discussed with neurology this morning.  Will get CT scan this afternoon for 24-hour results.    Acute  STEMI and primary PCI.  Needs resumption of DAPT when possible.  Aspirin was started.  Plavix remains on hold.  Pending CT head as per neurology.    Acute respiratory failure and remains on mechanical ventilation.  Not yet stable for weaning and extubation.  She is aphasic and does not follow commands.  I am unsure of her ability to protect her airway at this time.    Ischemic cardiomyopathy and with EF 10%.  No evidence of fluid overload so far.  Cardiology is following.    NICHO somewhat worse today.  May need nephrology input if continues to worsen due to multiple contrast exposures.    Blood sugar controlled with IV insulin.  Switch to sliding scale.  Has poorly controlled diabetes mellitus.    COPD without current wheezing.  Continue nebs.    Patient needs counseling to quit smoke prior to discharge    Hyponatremia is better on today's labs.    Check liver enzymes.  Tomorrow morning labs.    Evidence hyperlipidemia on lipid panel.    Patient was febrile overnight.  Pancultures sent and patient started on Zosyn.    UDS positive on admission.    I spent 35 mins critical care time in care of this patient outside of any procedures and not overlapping with any other provider.     Se Avitia MD  10/23/24  15:39 EDT      Part of this note may be an electronic transcription/translation of spoken language to printed text using the Dragon Dictation System.

## 2024-10-23 NOTE — CASE MANAGEMENT/SOCIAL WORK
Discharge Planning Assessment  [facility]     Patient Name: Albina Sosa  MRN: [id number]  Today's Date: 10/23/2024    Admit Date: 10/22/2024    Plan: Pending clinical course: remains intubated from 10/22   Discharge Needs Assessment       Row Name 10/23/24 1448       Living Environment    People in Home parent(s);other (see comments)    Name(s) of People in Home A man named Juan A & pt's mother per pt's spouse Caesar Sosa    Current Living Arrangements home    Potentially Unsafe Housing Conditions none    In the past 12 months has the electric, gas, oil, or water company threatened to shut off services in your home? No    Primary Care Provided by self    Family Caregiver if Needed parent(s);other (see comments)    Family Caregiver Names A man named Juan A & pt's mother per pt's spouse Caesar Sosa    Able to Return to Prior Arrangements yes       Resource/Environmental Concerns    Resource/Environmental Concerns none    Transportation Concerns none       Transportation Needs    In the past 12 months, has lack of transportation kept you from medical appointments or from getting medications? no    In the past 12 months, has lack of transportation kept you from meetings, work, or from getting things needed for daily living? No       Food Insecurity    Within the past 12 months, you worried that your food would run out before you got the money to buy more. Never true    Within the past 12 months, the food you bought just didn't last and you didn't have money to get more. Never true       Transition Planning    Patient/Family Anticipates Transition to home with family    Patient/Family Anticipated Services at Transition none    Transportation Anticipated family or friend will provide       Discharge Needs Assessment    Readmission Within the Last 30 Days no previous admission in last 30 days    Equipment Currently Used at Home none    Concerns to be Addressed discharge planning    Do you want help finding or  "keeping work or a job? Patient unable to answer  Intubated    Do you want help with school or training? For example, starting or completing job training or getting a high school diploma, GED or equivalent Patient unable to answer  Intubated    Anticipated Changes Related to Illness none    Equipment Needed After Discharge other (see comments)  TBD    Provided Post Acute Provider List? N/A    N/A Provider List Comment Pending clinical course, pt remains intubated, unsure yet of needs    Provided Post Acute Provider Quality & Resource List? N/A    N/A Quality & Resource List Comment Pending clinical course, pt remains intubated, unsure yet of needs                   Discharge Plan       Row Name 10/23/24 1448       Plan    Plan Pending clinical course: remains intubated from 10/22    Patient/Family in Agreement with Plan yes    Plan Comments CCP completed screen by calling pt's spouse, Caesar Sosa, RT pt being intubated. CCP introduced self, role, & DC planning discussed. Face sheet information & Rx verified. Pt's spouse reports, \"pt lives in house with some nancy named Juan A & her mother\". Pt's spouse denies [employer]SCAR or steps inside. Pt drives. Pt is independent with ADLs. Pt's spouse denies any DME. Pt's spouse unsure of pt having a living will & declined need for information. Pt's spouse agreeable to  Meds to Beds. Pt's spouse denies pt having issues with affording or managing medications, affording food, or affording utilities. Pt has not used home health nor been to a rehab facility. DC plan is pending clinical course. PT/OT/ST to be consulted once pt extubated & appropriate to work with therapy. DC barriers: remains intubated from 10/22, cortrak, restraints, & CT head. SUZY Shine/CCP                  Continued Care and Services - Admitted Since 10/22/2024    No active coordination exists for this encounter.          Demographic Summary       Row Name 10/23/24 1839       General Information    Admission Type " inpatient    Arrived From home;emergency department    Referral Source admission list    Reason for Consult discharge planning    Preferred Language English       Contact Information    Permission Granted to Share Info With                    Functional Status       Row Name 10/23/24 1448       Functional Status    Usual Activity Tolerance good    Current Activity Tolerance good       Functional Status, IADL    Medications independent    Meal Preparation independent    Housekeeping independent    Laundry independent    Shopping independent       Mental Status    General Appearance WDL WDL       Mental Status Summary    Recent Changes in Mental Status/Cognitive Functioning no changes       Employment/    Employment Status unemployed           Susi Winkler RN

## 2024-10-23 NOTE — PROGRESS NOTES
[facility] Progress Note       Encounter Date:10/23/24  Patient:Albina Sosa  :1965  MRN:[id number]      Chief Complaint: Follow-up STEMI      Subjective:        Patient remains intubated weaning sedation this morning.    Review of Systems:  Review of Systems   Unable to perform ROS: Intubated       Medications:  Scheduled Meds:  [Held by provider] aspirin, 81 mg, Oral, Daily  atorvastatin, 40 mg, Oral, Nightly  chlorhexidine, 15 mL, Mouth/Throat, Q12H  [Held by provider] clopidogrel, 75 mg, Oral, Daily  [START ON 10/24/2024] famotidine, 20 mg, Intravenous, Daily  sodium chloride, 10 mL, Intravenous, Q12H    Continuous Infusions:  insulin, 0-100 Units/hr, Last Rate: 2.5 Units/hr (10/23/24 0901)  niCARdipine, 5-15 mg/hr, Last Rate: Stopped (10/22/24 183)  propofol, 5-50 mcg/kg/min, Last Rate: 15 mcg/kg/min (10/22/24 2318)    PRN Meds:    acetaminophen    Calcium Replacement - Follow Nurse / BPA Driven Protocol    dextrose    dextrose    fentaNYL citrate (PF) **OR** fentaNYL citrate (PF)    glucagon (human recombinant)    hydrALAZINE    HYDROcodone-acetaminophen    Magnesium Standard Dose Replacement - Follow Nurse / BPA Driven Protocol    nitroglycerin    OLANZapine    ondansetron ODT **OR** ondansetron    Phosphorus Replacement - Follow Nurse / BPA Driven Protocol    Potassium Replacement - Follow Nurse / BPA Driven Protocol    Potassium Replacement - Follow Nurse / BPA Driven Protocol    [COMPLETED] Insert Peripheral IV **AND** sodium chloride    sodium chloride         Objective:       Vitals:    10/23/24 0700 10/23/24 0741 10/23/24 0800 10/23/24 0900   BP: 130/85  122/79 119/86   Pulse: (!) 125 (!) 125 (!) 125 (!) 124   Resp:  21     Temp:       TempSrc:       SpO2: 100% 100% 99% 99%   Weight:       Height:               Physical Exam:  Constitutional: Chronically ill-appearing, frail, no acute distress   HENT: Oropharynx clear and membrane moist, ET tube in place  Eyes: Normal  conjunctiva, no sclera icterus.  Neck: Supple, no carotid bruit bilaterally.  Cardiovascular: Regular and Tachycardia rate and rhythm, No Murmur, No bilateral lower extremity edema.  Pulmonary: Normal respiratory effort, coarse lung sounds, no wheezing.  Abdominal: Soft, nontender, no hepatosplenomegaly, liver is non-pulsatile.  Neurological: Waking up from sedation still fairly sleepy  Skin: Warm, dry, no ecchymosis, no rash.  Psych: Unable to assess.           Lab Review:   Results from last 7 days   Lab Units 10/23/24  0236 10/22/24  2253 10/22/24  1939 10/22/24  1150   SODIUM mmol/L 139  --  129* 125*   POTASSIUM mmol/L 3.5  --  4.3 5.1   CHLORIDE mmol/L 101  --  90* 86*   CO2 mmol/L 25.4  --  23.0 24.3   BUN mg/dL 21*  --  22* 22*   CREATININE mg/dL 1.54*  --  1.47* 1.35*   GLUCOSE mg/dL 169* 419* 699* 803*   CALCIUM mg/dL 11.0*  --  11.3* 10.2   AST (SGOT) U/L  --   --  138* 80*   ALT (SGPT) U/L  --   --  39* 41*     Results from last 7 days   Lab Units 10/22/24  1646 10/22/24  1150   HSTROP T ng/L >10,000* 3,432*     Results from last 7 days   Lab Units 10/23/24  0236 10/22/24  1646 10/22/24  1637 10/22/24  1150   WBC 10*3/mm3 14.22* 17.94*  --  13.48*   HEMOGLOBIN g/dL 14.8 15.2  --  15.9   HEMOGLOBIN, POC g/dL  --   --  16.6  --    HEMATOCRIT % 43.0 47.2*  --  48.4*   HEMATOCRIT POC %  --   --  49  --    PLATELETS 10*3/mm3 397 361  --  363     Results from last 7 days   Lab Units 10/23/24  0843 10/22/24  1722 10/22/24  1410 10/22/24  1150   INR   --   --   --  1.02   APTT seconds 23.6 49.8* 133.1* 22.4*         Results from last 7 days   Lab Units 10/23/24  0236   CHOLESTEROL mg/dL 245*   TRIGLYCERIDES mg/dL 311*   HDL CHOL mg/dL 37*     Results from last 7 days   Lab Units 10/22/24  1150   PROBNP pg/mL 26,236.0*               Cardiac Catheterization 10/22/2024:  Severe multivessel coronary artery disease with 100% ostial LAD, circumflex contains 100% stenosis of the mid marginal branch at the distal stent  edge likely culprit for STEMI presentation, left-sided PDA from the circumflex has 100% proximal segment stenoses with left to left collaterals from a LIMA to LAD supplying the distal PDA, patent LIMA to LAD LAD is diffusely diseased small in caliber size with sequential 80% stenoses.  LVEDP of approximately 30 mmHg  Ejection fraction 10%  Successful PCI to mid marginal with placement of a 2.25 x 12 mm Xience drug-eluting stent deployed initially at 8 keon with her stent balloon back slightly postdilated to 16 at  [place] to right femoral artery  Patient developed acute neurologic changes at the end of the case when we went to load her with aspirin and Plavix.  She was noted to be fairly unresponsive even after reversal with Narcan and flumazenil code stroke was called she was assessed emergently and taken off her head CT       Assessment:          Diagnosis Plan   1. Acute ST elevation myocardial infarction (STEMI), unspecified artery  Ambulatory Referral to Cardiac Rehab      2. Smoker        3. H/O medication noncompliance        4. Acute pulmonary edema               Plan:       Ms. Sosa is a 59 y.o. woman with past medical history notable for coronary disease status post LIMA to LAD percutaneous interventions to her circumflex and left-sided PDA, hypertension, mixed hyperlipidemia, diabetes type 2 on insulin therapy, history of stroke, history of methamphetamine use, and ongoing tobacco abuse with underlying lung disease who presented to the emergency room with a day or 2 of acute onset chest discomfort on 10/22 was noted to have ST changes concerning for STEMI.  She was taken emergently to the Cath Lab where she was found to have multivessel disease some chronic and on her mid marginal branch which fit with her EKG changes was felt to be the acute lesion and behaved so.  This was revascularized and stent placed.  She was also noted to have occlusion of her left dominant circumflex however this behave more  chronic given that there was already collateralization and wire would not pass.  She was also found to have severe cardiomyopathy which is new compared to at least stress findings from 2 years ago.  Unfortunately patient had a stroke symptoms at the end of the case she was taken emergently had TNK and subsequently taken for thrombectomy despite that still had a fairly sizable stroke on the left side.  She has been intubated since her procedure given that she was somewhat combative and needed paralytics for her thrombectomy.  Currently attempts are made to try and wean sedation this morning.  She remains critically ill echocardiogram is pending this morning unclear etiology for her stroke but left ventricular thrombus could be a high possibility given her low ejection fraction.  From a cardiac standpoint hemodynamically is doing okay she is very tachycardic this morning which would not be too surprising given her severe cardiomyopathy would not be unreasonable to try and add on a low-dose metoprolol today.  Would be appropriate to try and continue aspirin and clopidogrel if we can again have to balance complications from stroke versus maintaining patency of her recent stent.    STEMI:  Mid marginal branch felt to be culprit however patient with severe chronic disease throughout the LAD and circumflex and left-sided PDA  Status post PCI 10/22  Was loaded with aspirin and clopidogrel in the Cath Lab 10/2022  Continue aspirin  Would try and add on low-dose beta-blocker given tachycardia and reasonable blood pressures  Would like to continue aspirin and Plavix if possible from neurosurgery's perspective  Follow-up echocardiogram high likelihood for left ventricular thrombus may need to transition at some point to anticoagulation and clopidogrel if possible but again would have to wait on echocardiogram findings and clinical status    Stroke:  Likely cardioembolic unclear if from left ventricular thrombus or clot on the  catheter  Status post TNK and thrombectomy  Neurology and neurosurgery following    Acute systolic congestive heart failure:  Severely elevated LVEDP of 40 mmHg in the Cath Lab  Did receive diuretic her chest x-ray is looking better  Renal function elevated but stable  Sodium is also improved with correcting blood sugars    Diabetes type 2:   Elevated blood sugars noted but no evidence of acidosis think it is more of a hyperglycemia  Appreciate critical care assistance with managing her severe hyperglycemia     Acute hyponatremia:  Has improved could be related to heart failure versus hyperglycemia  Had issues with this in the past             Bret Lawrence MD  [facility]  10/23/24  09:53 EDT

## 2024-10-23 NOTE — PLAN OF CARE
Problem: Enteral Nutrition  Goal: Absence of Aspiration Signs and Symptoms  Outcome: Progressing  Goal: Safe, Effective Therapy Delivery  Outcome: Progressing  Goal: Feeding Tolerance  Outcome: Progressing   Goal Outcome Evaluation:            TF's per MD order  RD to follow

## 2024-10-23 NOTE — SIGNIFICANT NOTE
10/23/24 1148   OTHER   Discipline speech language pathologist   Rehab Time/Intention   Session Not Performed other (see comments)  (Patient remains intubated. RN reports SBT at this time. SLP to follow for eval readiness.)

## 2024-10-23 NOTE — CONSULTS
[facility]   Consult Note    Patient Name: Albina Sosa  : 1965  MRN: [id number]  Primary Care Physician:  Germaine James APRN  Referring Physician: No ref. provider found  Date of admission: 10/22/2024    Inpatient Vascular Surgery Consult  Consult performed by: Iggy Chavez MD  Consult ordered by: Rita Kong APRN        Subjective   Subjective     Reason for Consult/ Chief Complaint: Decreased peripheral perfusion evaluation    History of Present Illness  Albina Sosa is a 59 y.o. female patient in the intensive care unit intubated sedated on mechanical ventilator recovering from both a heart attack as well as a stroke.    Review of Systems     Personal History     No past medical history on file.    No past surgical history on file.    Family History: Her family history is not on file.     Social History: She  has no history on file for tobacco use, alcohol use, and drug use.    Home Medications:       Allergies:  She is allergic to bactrim [sulfamethoxazole-trimethoprim], cefaclor, flexeril [cyclobenzaprine], and robaxin [methocarbamol].    Objective    Objective     Vitals:    Temp:  [98.2 °F (36.8 °C)-98.3 °F (36.8 °C)] 98.3 °F (36.8 °C)  Heart Rate:  [] 92  Resp:  [12-24] 14  BP: ()/() 107/75  Flow (L/min) (Oxygen Therapy):  [2-15] 15  FiO2 (%):  [40 %] 40 %  Output by Drain (mL) 10/21/24 0701 - 10/21/24 1900 10/21/24 1901 - 10/22/24 0700 10/22/24 0701 - 10/22/24 1900 10/22/24 1901 - 10/22/24 2030 Range Total   Urethral Catheter Straight-tip;Silicone 16 Fr.   1400  1400       Physical Exam  Constitutional:       Appearance: She is well-developed.   Pulmonary:      Effort: Pulmonary effort is normal. No respiratory distress.   Abdominal:      General: There is no distension.      Palpations: Abdomen is soft.   Neurological:      Mental Status: She is alert and oriented to person, place, and time.     Unable to find pulses in either feet.  She has cap refill  bilaterally at about 3 seconds.  Per reports and discussion with the nurses it sounds like her feet are looking better.    Result Review    Result Review:  I have personally reviewed the results from the time of this admission to 10/22/2024 20:30 EDT and agree with these findings:  [x]  Laboratory list / accordion  []  Microbiology  []  Radiology  []  EKG/Telemetry   []  Cardiology/Vascular   []  Pathology  []  Old records  []  Other:  Most notable findings include: White count 17.9 hemoglobin 15.2 troponins greater than 10,000 glucose 593      Assessment & Plan   Assessment / Plan     Brief Patient Summary:  Albina Sosa is a 59 y.o. female patient with multisystem organ failure with recent PCI yesterday for heart attack as well as a recent percutaneous embolectomy for stroke.  She has some questionable hemorrhagic conversion in the large stroke on post embolectomy MRI.  Neurology's concern for hemorrhagic conversion.  No anticoagulation at the current time.    Active Hospital Problems:  Active Hospital Problems    Diagnosis     **STEMI involving left circumflex coronary artery     Acute ischemic left middle cerebral artery (MCA) stroke        Plan:   10/22/2024: Patient in critical condition with multisystem organ failure on the mechanical ventilator following stroke and heart attack today.  Uncontrolled vascular risk factors with reports of hemoglobin A1c of 16 and daily blood sugars of 600.  Almost certainly she has baseline peripheral vascular disease.  Currently her feet have cap refill and have external appearance of being well-perfused.  Without the ability to perform surgery or provide anticoagulation we will continue to closely observe her.  This is clearly a situation were putting life-saving measures over limb saving measures.      VTE Prophylaxis:  Mechanical VTE prophylaxis orders are present.         Iggy Chavez MD

## 2024-10-23 NOTE — PROGRESS NOTES
[facility]   Surgical Progress Note    Patient Name: Albina Sosa  : 1965  MRN: [id number]  Date of admission: 10/22/2024  Surgical Procedures Since Admission:  Procedure(s):  Left Heart Cath  Stent LAM coronary  Percutaneous Coronary Intervention  Coronary angiography  Surgeon:  Bret Lawrence MD  Status:  1 Day Post-Op  -------------------    Procedure(s):  EMBOLECTOMY MECHANICAL  Surgeon:  Jeremiah Carey MD  Status:  1 Day Post-Op  -------------------    Subjective   Subjective     Chief Complaint: Cool extremity follow-up.    History of Present Illness   Patient remains intubated on the mechanical ventilator but per nurse being more alert.    Review of Systems    Objective   Objective     Vitals:   Temp:  [98.2 °F (36.8 °C)-99.5 °F (37.5 °C)] 99.5 °F (37.5 °C)  Heart Rate:  [] 125  Resp:  [12-24] 17  BP: ()/() 123/82  Flow (L/min) (Oxygen Therapy):  [2-15] 15  FiO2 (%):  [40 %] 40 %  Output by Drain (mL) 10/22/24 0701 - 10/22/24 1900 10/22/24 1901 - 10/23/24 0700 10/23/24 0701 - 10/23/24 0709 Range Total   Urethral Catheter Straight-tip;Silicone 16 Fr. 1400 500  1900       Physical Exam  Constitutional:       Appearance: She is well-developed.   Pulmonary:      Effort: Pulmonary effort is normal. No respiratory distress.   Abdominal:      General: There is no distension.      Palpations: Abdomen is soft.   Neurological:      Mental Status: She is alert and oriented to person, place, and time.     Difficult to find signals in bilateral feet however approximately 2-second capillary refill.  Feet appear viable bilaterally.  They are warm to the touch.     Result Review    Result Review:  I have personally reviewed the results from the time of this admission to 10/23/2024 07:09 EDT and agree with these findings:  [x]  Laboratory list / accordion  []  Microbiology  []  Radiology  []  EKG/Telemetry   []  Cardiology/Vascular   []  Pathology  []  Old records  []  Other:  Most  notable findings include: Glucose 157 hemoglobin A1c 14.      Assessment & Plan   Assessment / Plan     Brief Patient Summary:  Albina Sosa is a 59 y.o. female who with multisystem organ failure with recent PCI yesterday for heart attack as well as a recent percutaneous embolectomy for stroke. She has some questionable hemorrhagic conversion in the large stroke on post embolectomy MRI. Neurology's concern for hemorrhagic conversion. No anticoagulation at the current time.     Active Hospital Problems:   Active Hospital Problems    Diagnosis     **STEMI involving left circumflex coronary artery     Acute ischemic left middle cerebral artery (MCA) stroke      Plan:   10/23/2024: Continue supportive care.  Nothing to offer at the current time from a vascular surgical standpoint(current critical illness with contraindication anticoagulation).  Her feet appear viable without clinical signs or symptoms of critical limb ischemia.    VTE Prophylaxis:  Mechanical VTE prophylaxis orders are present.        Iggy Chavez MD

## 2024-10-24 NOTE — PROGRESS NOTES
"Nutrition Services    Patient Name:  Albina Sosa  YOB: 1965  MRN: [id number]  Admit Date:  10/22/2024  Assessment Date:  10/24/24    CLINICAL NUTRITION    Comments: Follow up for tube feeding    Current TF's: Diabetisource AC at 20ml/hr, water [id number] and pt tolerating  Pt remains on the vent  Labs: glu 210, BUN 30, cr 1.63  No BM yet, not on a bowel regimen  CT of head this am  Meds:Propofol off, cardene held, lipitor, peridex, plavix, pepcid, insulin, zosyn, KCL    Plan/Recommendations:   TF goal rate is Diabetisource AC at 55ml/hr  Water flushes [id number]  Will monitor labs, electrolytes to be replaced as needed     RD to continue to monitor per protocol.     Encounter Information         Reason For Encounter Follow up for TF    Current Issues Remains on the vent, STEMI involving left circumflex coronary artery, acute ischemic left middle cerebral artery stroke      Estimated Requirements         Calories  [id number] (25 kcal/kg, 30 kcal/kg)    Protein (gm)  67-84 (1.2 - 1.5 gm/kg)    Fluid (mL)   (1 mL/kcal)     Current Nutrition Orders & Evaluation of Intake       Oral Nutrition     Current PO Diet NPO Diet NPO Type: Tube Feeding   Supplement n/a   PO Evaluation     Trending % PO Intake     Factors Affecting Intake  altered respiratory status      Enteral Nutrition     Enteral Route NG    TF Delivery Method Continuous    Propofol Rate/Kcal     Current TF/Rate (mL) Diabetisource AC @ 20 mL/hr    TF Goal Rate (mL) 55    Current Water Flush (mL) 30 Q 4 hr    Modular None    TF Residual  no or minimal residual    TF Tolerance tolerating    TF Observation Verified correct TF and water flush infusing per orders     Anthropometrics          Height    Weight Height: 154.9 cm (61\")  Weight: 51.1 kg (112 lb 10.5 oz) (10/24/24 0500)    BMI kg/m2 Body mass index is 21.29 kg/m².  Normal/Healthy (18.4 - 24.9)    Weight trend Unknown     Labs        Pertinent Labs Reviewed, listed below     Results from last 7 " days   Lab Units 10/24/24  0333 10/23/24  2258 10/23/24  0236 10/22/24  2253 10/22/24  1939 10/22/24  1150   SODIUM mmol/L 136  --  139  --  129* 125*   POTASSIUM mmol/L 3.6 3.8 3.5  --  4.3 5.1   CHLORIDE mmol/L 100  --  101  --  90* 86*   CO2 mmol/L 24.8  --  25.4  --  23.0 24.3   BUN mg/dL 30*  --  21*  --  22* 22*   CREATININE mg/dL 1.63*  --  1.54*  --  1.47* 1.35*   CALCIUM mg/dL 9.4  --  11.0*  --  11.3* 10.2   BILIRUBIN mg/dL 0.4  --   --   --  0.7 0.6   ALK PHOS U/L 115  --   --   --  153* 194*   ALT (SGPT) U/L 24  --   --   --  39* 41*   AST (SGOT) U/L 58*  --   --   --  138* 80*   GLUCOSE mg/dL 178*  --  169* 419* 699* 803*     Results from last 7 days   Lab Units 10/24/24  0333 10/23/24  0236   PHOSPHORUS mg/dL 3.1  --    HEMOGLOBIN g/dL 12.2 14.8   HEMATOCRIT % 36.5 43.0   WBC 10*3/mm3 15.34* 14.22*   TRIGLYCERIDES mg/dL  --  311*   ALBUMIN g/dL 2.8*  --      Results from last 7 days   Lab Units 10/24/24  0333 10/23/24  0843 10/23/24  0236 10/22/24  1722 10/22/24  1646 10/22/24  1410 10/22/24  1150   INR   --   --   --   --   --   --  1.02   APTT seconds  --  23.6  --  49.8*  --  133.1* 22.4*   PLATELETS 10*3/mm3 304  --  397  --  361  --  363     SARS-CoV-2, SALVADOR   Date Value Ref Range Status   04/29/2022 NEGATIVE Negative Final     Comment:     The 2019-CoV rRT-PCR Assay is only for use under a Food and Drug Administration Emergency Use Authorization. The performance characteristics of the assay were verified by the Clinical Laboratory at [facility]. Results should be used in   conjunction with the patient's clinical symptoms, medical history, and other clinical/laboratory findings to determine an overall clinical diagnosis. Negative results do not preclude infection with SARS-CoV-2 (COVID-19).    Test parameters have not been validated for screening asymptomatic patients.     Lab Results   Component Value Date    HGBA1C 14.70 (H) 10/23/2024          Medications            Scheduled  Medications aspirin, 81 mg, Nasogastric, Daily  atorvastatin, 40 mg, Oral, Nightly  chlorhexidine, 15 mL, Mouth/Throat, Q12H  clopidogrel, 75 mg, Nasogastric, Daily  famotidine, 20 mg, Intravenous, Daily  insulin glargine, 20 Units, Subcutaneous, Q12H  insulin regular, 4-24 Units, Subcutaneous, Q4H  ipratropium-albuterol, 3 mL, Nebulization, 4x Daily - RT  piperacillin-tazobactam, 3.375 g, Intravenous, Q8H  potassium chloride, 40 mEq, Oral, Q4H  sodium chloride, 10 mL, Intravenous, Q12H        Infusions niCARdipine, 5-15 mg/hr, Last Rate: Stopped (10/22/24 1832)  propofol, 5-50 mcg/kg/min, Last Rate: Stopped (10/23/24 0830)        PRN Medications   acetaminophen    Calcium Replacement - Follow Nurse / BPA Driven Protocol    dextrose    dextrose    fentaNYL citrate (PF) **OR** fentaNYL citrate (PF)    glucagon (human recombinant)    hydrALAZINE    HYDROcodone-acetaminophen    Magnesium Standard Dose Replacement - Follow Nurse / BPA Driven Protocol    nitroglycerin    OLANZapine    ondansetron ODT **OR** ondansetron    Phosphorus Replacement - Follow Nurse / BPA Driven Protocol    Potassium Replacement - Follow Nurse / BPA Driven Protocol    Potassium Replacement - Follow Nurse / BPA Driven Protocol    [COMPLETED] Insert Peripheral IV **AND** sodium chloride    sodium chloride     Physical Findings          Physical Appearance ventilator support   Oral/Mouth Cavity tooth or teeth missing   Edema  not assessed   Gastrointestinal hypoactive bowel sounds   Skin  bruising   Tubes/Drains Cortrak, NG tube, bridle in place   NFPE Not indicated at this time     NUTRITION INTERVENTION / PLAN OF CARE  Intervention Goal         Intervention Goal(s) Maintain nutrition status, Reduce/improve symptoms, Meet estimated needs, Disease management/therapy, Tolerate TF/PN at goal, Transition TF to PO, and Maintain weight     Nutrition Intervention         RD Action Continue to monitor and Care plan reviewed     Prescription         Diet  Prescription     Supplement Prescription    EN/PN Prescription    New Prescription Ordered? Continue same per protocol   --  Enteral Prescription:      Enteral Route NG    TF Delivery Method Continuous    Enteral Product Diabetisource AC    Modular None    Propofol Rate/Kcal On hold    TF Start Rate 20    TF Goal Rate 55    Free Water Flush [id number]    TF Provision at Goal: 1452 kcal, 72 gm protein, 992 mL free water + 180 mL water flushes         Calories 100 % needs met         Protein  100 % needs met         Fluid (mL)      Prescription Ordered Yes      Monitor/Evaluation        Monitor Per protocol, I&O, Pertinent labs, EN delivery/tolerance, Weight, Skin status, GI status, Symptoms   Discharge Needs Pending clinical course       Electronically signed by:  Gely Dalton RD  10/24/24 10:50 EDT

## 2024-10-24 NOTE — PROGRESS NOTES
"DOS: 10/24/2024  NAME: Albina Sosa   : 1965  PCP: Germaine James APRN  Chief Complaint   Patient presents with    Shortness of Breath    Chest Pain       Chief complaint: Left MCA ischemic stroke recent myocardial infarction status post stenting  Subjective: Patient seen and examined at the bedside this morning, patient intubated off sedation overnight, per nursing staff she 50 of fentanyl earlier this morning at 5 AM for agitation, no reported seizure activity.    Objective:  Vital signs: BP 93/70   Pulse 102   Temp 99.3 °F (37.4 °C) (Oral)   Resp 13   Ht 154.9 cm (61\")   Wt 51.1 kg (112 lb 10.5 oz)   SpO2 100%   BMI 21.29 kg/m²    Gen: Lethargic, drowsy, open to sternal rub, vitals reviewed  MS: Drowsy, did not follow commands, globally aphasic, eyes closed does not respond to voice but opens to sternal rub.  CN: Does not blink to threat on the right, VOR present, gaze midline, pupils 2 mm reactive to the light bilaterally, unable to assess dysarthria, right facial droop to supraorbital pain.    Motor: Spontaneously moving the left side, plegic on the right, normal tone  Sensory: Unable to assess due to patient condition    Laboratory results:  Lab Results   Component Value Date    GLUCOSE 178 (H) 10/24/2024    CALCIUM 9.4 10/24/2024     10/24/2024    K 3.6 10/24/2024    CO2 24.8 10/24/2024     10/24/2024    BUN 30 (H) 10/24/2024    CREATININE 1.63 (H) 10/24/2024    EGFRIFAFRI 69 2022    EGFRIFNONA 60 2022    BCR 18.4 10/24/2024    ANIONGAP 11.2 10/24/2024     Lab Results   Component Value Date    WBC 15.34 (H) 10/24/2024    HGB 12.2 10/24/2024    HCT 36.5 10/24/2024    MCV 88.2 10/24/2024     10/24/2024     Lab Results   Component Value Date     (H) 10/23/2024    LDL  2023      Comment:        LDL cholesterol not calculated. Triglyceride levels  greater than 400 mg/dL invalidate calculated LDL results.    Reference range: <100    Desirable range " <100 mg/dL for primary prevention;    <70 mg/dL for patients with CHD or diabetic patients   with > or = 2 CHD risk factors.    LDL-C is now calculated using the Narciso-Ortiz   calculation, which is a validated novel method providing   better accuracy than the Friedewald equation in the   estimation of LDL-C.   Narciso SS et al. HIMA. 2013;310(19): 3644-9627   (http://education.[employer]/faq/OBB993)    LDL  07/18/2023      Comment:        LDL cholesterol not calculated. Triglyceride levels  greater than 400 mg/dL invalidate calculated LDL results.    Reference range: <100    Desirable range <100 mg/dL for primary prevention;    <70 mg/dL for patients with CHD or diabetic patients   with > or = 2 CHD risk factors.    LDL-C is now calculated using the Narciso-Ortiz   calculation, which is a validated novel method providing   better accuracy than the Friedewald equation in the   estimation of LDL-C.   Narciso SS et al. HIMA. 2013;310(19): 3566-9336   (http://education.[employer]/faq/ULG072)         Lab 10/23/24  0236   HEMOGLOBIN A1C 14.70*        Review of labs:     Review and interpretation of imaging: I personally reviewed repeated CT head 24-hour post TNK which showed left MCA hypodensity with no acute hemorrhagic transformation.  Repeat CT head this morning personally reviewed stable compared to prior exam from yesterday, no obvious hemorrhagic transformation.    Diagnoses:  -Left MCA acute ischemic stroke cardioembolic status post TNK and mechanical thrombectomy  -Left M2 occlusion status post mechanical thrombectomy [id number]  -Significant hyperglycemia on presentation  -Uncontrolled type 2 diabetes  -Meth abuse  -Recent myocardial infarction status post stenting  -Cardiomyopathy  -Heart failure with reduced ejection fraction  -Tobacco abuse        Comment: Overall her exam is stable compared to yesterday.     Plan:  -Continue dual antiplatelet therapy giving her recent stenting.  -Goal LDL  less than 70 currently at 150, Lipitor 80 mg daily  -Goal blood sugar less than 140, avoid hyperglycemia above 180 or hypoglycemia less than 100.  -Goal blood pressure less than 140 systolic, recommend strict blood pressure control to prevent hemorrhagic transformation.  -CT head this morning stable will start Lovenox for DVT prophylaxis  -Keep on bedrest for today.  -Goal A1c less than 7 currently at 14.7, consult diabetic educator when appropriate  -Patient is critically ill with high risk of complications  - regarding tobacco abuse and meth abuse when appropriate.     Management discussed with nursing staff, Dr. Avitia.

## 2024-10-24 NOTE — PLAN OF CARE
Goal Outcome Evaluation:  Plan of Care Reviewed With: patient        Progress: no change     Pt remains on ventilator with FiO2 of 30%. No continuous sedation all shift. PRN Norco given once for elevated CPOT score. IV Fentanyl given once as pt tried to pull out ETT once she was cleaned up. Left wrist restraint in place to prevent pulling out ETT. No response to stimuli on RUE (withdraws RLE and spontaneous movement noted on left side). NIH 23. Pt failed SBT due to not following commands. Head CT unchanged today and pt to be started on Lovenox for DVT prophylaxis. Plavix restarted per Cardio. Left wrist restraint in place to prevent pulling out ETT. Spouse visited pt today. Tolerating TF. Voiding in Man Catheter. Ongoing care provided.

## 2024-10-24 NOTE — PLAN OF CARE
Goal Outcome Evaluation:         VSS, Labs stable, Blood sugars are still running high, patient still not following commands, RUE is flaccid, RLE moves rarely, both left extremities move spontaneously. Bath done at 2200. No acute events overnight.          Problem: Adult Inpatient Plan of Care  Goal: Plan of Care Review  Outcome: Progressing  Goal: Patient-Specific Goal (Individualized)  Outcome: Progressing  Goal: Absence of Hospital-Acquired Illness or Injury  Outcome: Progressing  Intervention: Identify and Manage Fall Risk  Recent Flowsheet Documentation  Taken 10/24/2024 0600 by Marlene Abarca RN  Safety Promotion/Fall Prevention:   safety round/check completed   room organization consistent   lighting adjusted   clutter free environment maintained  Taken 10/24/2024 0500 by Marlene Abarca RN  Safety Promotion/Fall Prevention:   safety round/check completed   room organization consistent   lighting adjusted   clutter free environment maintained  Taken 10/24/2024 0400 by Marlene Abarca RN  Safety Promotion/Fall Prevention:   safety round/check completed   room organization consistent   lighting adjusted   clutter free environment maintained  Taken 10/24/2024 0300 by Marlene Abarca RN  Safety Promotion/Fall Prevention:   safety round/check completed   room organization consistent   lighting adjusted   clutter free environment maintained  Taken 10/24/2024 0200 by Marlene Abarca RN  Safety Promotion/Fall Prevention:   safety round/check completed   room organization consistent   lighting adjusted   clutter free environment maintained  Taken 10/24/2024 0100 by Marlene Abarca RN  Safety Promotion/Fall Prevention:   safety round/check completed   room organization consistent   lighting adjusted   clutter free environment maintained  Taken 10/24/2024 0000 by Marlene Abarca RN  Safety Promotion/Fall Prevention:   safety round/check completed   room organization consistent   lighting adjusted   clutter free environment  maintained  Taken 10/23/2024 2300 by Marlene Abarca RN  Safety Promotion/Fall Prevention:   safety round/check completed   room organization consistent   lighting adjusted   clutter free environment maintained  Taken 10/23/2024 2200 by Marlene Abarca RN  Safety Promotion/Fall Prevention:   safety round/check completed   room organization consistent   lighting adjusted   clutter free environment maintained  Taken 10/23/2024 2100 by Marlene Abarca RN  Safety Promotion/Fall Prevention:   safety round/check completed   room organization consistent   lighting adjusted   clutter free environment maintained  Taken 10/23/2024 2000 by Marlene Abarca RN  Safety Promotion/Fall Prevention:   safety round/check completed   room organization consistent   lighting adjusted   clutter free environment maintained  Intervention: Prevent Skin Injury  Recent Flowsheet Documentation  Taken 10/24/2024 0200 by Marlene Abarca RN  Body Position: supine  Taken 10/24/2024 0000 by Marlene Abarca RN  Body Position:   turned   tilted   right  Taken 10/23/2024 2200 by Marlene Abarca RN  Body Position:   turned   tilted   left  Taken 10/23/2024 2000 by Marlene Abarca RN  Body Position:   turned   supine  Intervention: Prevent and Manage VTE (Venous Thromboembolism) Risk  Recent Flowsheet Documentation  Taken 10/24/2024 0400 by Marlene Abarca RN  VTE Prevention/Management:   bilateral   SCDs (sequential compression devices) on  Taken 10/24/2024 0000 by Marlene Abarca RN  VTE Prevention/Management:   bilateral   SCDs (sequential compression devices) on  Intervention: Prevent Infection  Recent Flowsheet Documentation  Taken 10/24/2024 0600 by Marlene Abarca RN  Infection Prevention:   environmental surveillance performed   equipment surfaces disinfected   hand hygiene promoted   personal protective equipment utilized   rest/sleep promoted   single patient room provided  Taken 10/24/2024 0500 by Marlene Abarca RN  Infection Prevention:   environmental  surveillance performed   equipment surfaces disinfected   hand hygiene promoted   personal protective equipment utilized   rest/sleep promoted   single patient room provided  Taken 10/24/2024 0400 by Marlene Abarca RN  Infection Prevention:   environmental surveillance performed   equipment surfaces disinfected   hand hygiene promoted   single patient room provided   rest/sleep promoted   personal protective equipment utilized  Taken 10/24/2024 0300 by Marlene Abarca RN  Infection Prevention:   environmental surveillance performed   equipment surfaces disinfected   hand hygiene promoted   personal protective equipment utilized   rest/sleep promoted   single patient room provided  Taken 10/24/2024 0200 by Marlene Abarca RN  Infection Prevention:   environmental surveillance performed   equipment surfaces disinfected   hand hygiene promoted   personal protective equipment utilized   rest/sleep promoted   single patient room provided  Taken 10/24/2024 0100 by Marlene Abarca RN  Infection Prevention:   environmental surveillance performed   equipment surfaces disinfected   hand hygiene promoted   personal protective equipment utilized   rest/sleep promoted   single patient room provided  Taken 10/24/2024 0000 by Marlene Abarca RN  Infection Prevention:   environmental surveillance performed   equipment surfaces disinfected   hand hygiene promoted   personal protective equipment utilized   rest/sleep promoted   single patient room provided  Taken 10/23/2024 2300 by Marlene Abarca RN  Infection Prevention:   environmental surveillance performed   equipment surfaces disinfected   hand hygiene promoted   personal protective equipment utilized   rest/sleep promoted   single patient room provided  Taken 10/23/2024 2200 by Marlene Abarca RN  Infection Prevention:   environmental surveillance performed   equipment surfaces disinfected   hand hygiene promoted   personal protective equipment utilized   rest/sleep promoted    single patient room provided  Taken 10/23/2024 2100 by Marlene Abarca RN  Infection Prevention:   environmental surveillance performed   equipment surfaces disinfected   hand hygiene promoted   personal protective equipment utilized   rest/sleep promoted   single patient room provided  Taken 10/23/2024 2000 by Marlene Abarca RN  Infection Prevention:   environmental surveillance performed   equipment surfaces disinfected   hand hygiene promoted   personal protective equipment utilized   rest/sleep promoted   single patient room provided  Goal: Optimal Comfort and Wellbeing  Outcome: Progressing  Intervention: Provide Person-Centered Care  Recent Flowsheet Documentation  Taken 10/24/2024 0400 by Marlene Abarca RN  Trust Relationship/Rapport:   care explained   choices provided   emotional support provided   questions answered   questions encouraged   empathic listening provided   thoughts/feelings acknowledged   reassurance provided  Taken 10/24/2024 0000 by Marlene Abarca RN  Trust Relationship/Rapport:   care explained   choices provided   empathic listening provided   questions answered   reassurance provided   thoughts/feelings acknowledged   questions encouraged   emotional support provided  Taken 10/23/2024 2000 by Marlene Abarca RN  Trust Relationship/Rapport:   care explained   choices provided   emotional support provided   empathic listening provided   questions answered   questions encouraged   reassurance provided   thoughts/feelings acknowledged  Goal: Readiness for Transition of Care  Outcome: Progressing     Problem: Skin Injury Risk Increased  Goal: Skin Health and Integrity  Outcome: Progressing  Intervention: Optimize Skin Protection  Recent Flowsheet Documentation  Taken 10/24/2024 0200 by Marlene Abarca RN  Head of Bed (HOB) Positioning: HOB at 30-45 degrees  Taken 10/24/2024 0000 by Marlene Abarca RN  Head of Bed (HOB) Positioning: HOB at 30-45 degrees  Taken 10/23/2024 2200 by Marlene Abarca  RN  Head of Bed (HOB) Positioning: HOB at 30-45 degrees  Taken 10/23/2024 2000 by Marlene Abarca RN  Head of Bed (HOB) Positioning: HOB at 30-45 degrees     Problem: Restraint, Nonviolent  Goal: Absence of Harm or Injury  Outcome: Progressing  Intervention: Implement Least Restrictive Safety Strategies  Recent Flowsheet Documentation  Taken 10/24/2024 0600 by Marlene Abarca RN  Medical Device Protection: IV pole/bag removed from visual field  Diversional Activities: television  Taken 10/24/2024 0500 by Marlene Abarca RN  Medical Device Protection: IV pole/bag removed from visual field  Taken 10/24/2024 0400 by Marlene Abarca RN  Medical Device Protection: IV pole/bag removed from visual field  Diversional Activities: television  Taken 10/24/2024 0300 by Marlene Abarca RN  Medical Device Protection: IV pole/bag removed from visual field  Taken 10/24/2024 0200 by Marlene Abarca RN  Medical Device Protection: IV pole/bag removed from visual field  Diversional Activities: television  Taken 10/24/2024 0100 by Marlene Abarca RN  Medical Device Protection: IV pole/bag removed from visual field  Taken 10/24/2024 0000 by Marlene Abarca RN  Medical Device Protection: IV pole/bag removed from visual field  Diversional Activities: television  Taken 10/23/2024 2300 by Marlene Abarca RN  Medical Device Protection: IV pole/bag removed from visual field  Taken 10/23/2024 2200 by Marlene Abarca RN  Medical Device Protection: IV pole/bag removed from visual field  Diversional Activities: television  Taken 10/23/2024 2100 by Marlene Abarca RN  Medical Device Protection: IV pole/bag removed from visual field  Taken 10/23/2024 2000 by Marlene Abarca RN  Medical Device Protection: IV pole/bag removed from visual field  Diversional Activities: television  Intervention: Protect Dignity, Rights and Personal Wellbeing  Recent Flowsheet Documentation  Taken 10/24/2024 0400 by Marlene Abarca RN  Trust Relationship/Rapport:   care explained    choices provided   emotional support provided   questions answered   questions encouraged   empathic listening provided   thoughts/feelings acknowledged   reassurance provided  Taken 10/24/2024 0000 by Marlene Abarca RN  Trust Relationship/Rapport:   care explained   choices provided   empathic listening provided   questions answered   reassurance provided   thoughts/feelings acknowledged   questions encouraged   emotional support provided  Taken 10/23/2024 2000 by Marlene Abarca RN  Trust Relationship/Rapport:   care explained   choices provided   emotional support provided   empathic listening provided   questions answered   questions encouraged   reassurance provided   thoughts/feelings acknowledged  Intervention: Protect Skin and Joint Integrity  Recent Flowsheet Documentation  Taken 10/24/2024 0200 by Marlene Abarca RN  Body Position: supine  Taken 10/24/2024 0000 by Marlene Abarca RN  Body Position:   turned   tilted   right  Range of Motion: ROM (range of motion) performed  Taken 10/23/2024 2200 by Marlene Abarca RN  Body Position:   turned   tilted   left  Taken 10/23/2024 2000 by Marlene Abarca RN  Body Position:   turned   supine  Range of Motion: ROM (range of motion) performed     Problem: Mechanical Ventilation Invasive  Goal: Effective Communication  Outcome: Progressing  Intervention: Ensure Effective Communication  Recent Flowsheet Documentation  Taken 10/24/2024 0600 by Marlene Abarca RN  Diversional Activities: television  Taken 10/24/2024 0400 by Marlene Abarca RN  Trust Relationship/Rapport:   care explained   choices provided   emotional support provided   questions answered   questions encouraged   empathic listening provided   thoughts/feelings acknowledged   reassurance provided  Diversional Activities: television  Family/Support System Care:   support provided   self-care encouraged  Taken 10/24/2024 0200 by Marlene Abarca RN  Diversional Activities: television  Taken 10/24/2024 0000 by  Marlene Abarca RN  Trust Relationship/Rapport:   care explained   choices provided   empathic listening provided   questions answered   reassurance provided   thoughts/feelings acknowledged   questions encouraged   emotional support provided  Diversional Activities: television  Family/Support System Care: support provided  Taken 10/23/2024 2200 by Marlene Abarca RN  Diversional Activities: television  Taken 10/23/2024 2000 by Marlene Abarca RN  Trust Relationship/Rapport:   care explained   choices provided   emotional support provided   empathic listening provided   questions answered   questions encouraged   reassurance provided   thoughts/feelings acknowledged  Diversional Activities: television  Family/Support System Care:   support provided   self-care encouraged  Goal: Optimal Device Function  Outcome: Progressing  Intervention: Optimize Device Care and Function  Recent Flowsheet Documentation  Taken 10/24/2024 0600 by Marlene Abarca RN  Airway Safety Measures:   mask valve resuscitator at bedside   manual resuscitator/mask at bedside   oxygen flowmeter at bedside   suction at bedside  Taken 10/24/2024 0500 by Marlene Abarca RN  Airway Safety Measures:   mask valve resuscitator at bedside   manual resuscitator/mask at bedside   oxygen flowmeter at bedside   suction at bedside  Taken 10/24/2024 0400 by Marlene Abarca RN  Airway Safety Measures:   manual resuscitator/mask at bedside   mask valve resuscitator at bedside   oxygen flowmeter at bedside   suction at bedside  Taken 10/24/2024 0300 by Marlene Abarca RN  Airway Safety Measures:   mask valve resuscitator at bedside   manual resuscitator/mask at bedside   suction at bedside   oxygen flowmeter at bedside  Taken 10/24/2024 0200 by Marlene Abarca RN  Airway Safety Measures:   mask valve resuscitator at bedside   manual resuscitator/mask at bedside   suction at bedside   oxygen flowmeter at bedside  Taken 10/24/2024 0100 by Marlene Abarca RN  Airway Safety  Measures:   mask valve resuscitator at bedside   manual resuscitator/mask at bedside   suction at bedside   oxygen flowmeter at bedside  Taken 10/24/2024 0000 by Marlene Abarca RN  Airway Safety Measures:   mask valve resuscitator at bedside   manual resuscitator/mask at bedside   suction at bedside   oxygen flowmeter at bedside  Taken 10/23/2024 2300 by Marlene Abarca RN  Airway Safety Measures:   mask valve resuscitator at bedside   manual resuscitator/mask at bedside   suction at bedside   oxygen flowmeter at bedside  Taken 10/23/2024 2200 by Marlene Abarca RN  Airway Safety Measures:   mask valve resuscitator at bedside   manual resuscitator/mask at bedside   suction at bedside   oxygen flowmeter at bedside  Taken 10/23/2024 2100 by Marlene Abarca RN  Oral Care:   teeth brushed - suction toothbrush   swabbed with antiseptic solution  Airway Safety Measures:   mask valve resuscitator at bedside   manual resuscitator/mask at bedside   suction at bedside   oxygen flowmeter at bedside  Taken 10/23/2024 2000 by Marlene Abarca RN  Airway Safety Measures:   mask valve resuscitator at bedside   manual resuscitator/mask at bedside   suction at bedside   oxygen flowmeter at bedside  Goal: Mechanical Ventilation Liberation  Outcome: Progressing  Intervention: Promote Extubation and Mechanical Ventilation Liberation  Recent Flowsheet Documentation  Taken 10/24/2024 0600 by Marlene Abarca RN  Medication Review/Management: medications reviewed  Taken 10/24/2024 0500 by Marlene Abarca RN  Medication Review/Management: medications reviewed  Taken 10/24/2024 0400 by Marlene Abarca RN  Medication Review/Management: medications reviewed  Taken 10/24/2024 0300 by Marlene Abarca RN  Medication Review/Management: medications reviewed  Taken 10/24/2024 0200 by Marlene Abarca RN  Medication Review/Management: medications reviewed  Taken 10/24/2024 0100 by Marlene Abarca RN  Medication Review/Management: medications reviewed  Taken  10/24/2024 0000 by Marlene Abarca RN  Medication Review/Management: medications reviewed  Taken 10/23/2024 2300 by Marlene Abarca RN  Medication Review/Management: medications reviewed  Taken 10/23/2024 2200 by Marlene Abarca RN  Medication Review/Management: medications reviewed  Taken 10/23/2024 2100 by Marlene Abarca RN  Medication Review/Management: medications reviewed  Taken 10/23/2024 2000 by Marlene Abarca RN  Medication Review/Management: medications reviewed  Goal: Optimal Nutrition Delivery  Outcome: Progressing  Goal: Absence of Device-Related Skin and Tissue Injury  Outcome: Progressing  Goal: Absence of Ventilator-Induced Lung Injury  Outcome: Progressing  Intervention: Prevent Ventilator-Associated Pneumonia  Recent Flowsheet Documentation  Taken 10/24/2024 0200 by Marlene Abarca RN  Head of Bed (HOB) Positioning: HOB at 30-45 degrees  Taken 10/24/2024 0000 by Marlene Abarca RN  Head of Bed (HOB) Positioning: HOB at 30-45 degrees  Taken 10/23/2024 2200 by Marlene Abarca RN  Head of Bed (HOB) Positioning: HOB at 30-45 degrees  Taken 10/23/2024 2100 by Marlene Abarca RN  Oral Care:   teeth brushed - suction toothbrush   swabbed with antiseptic solution  Taken 10/23/2024 2000 by Marlene Abarca RN  Head of Bed (HOB) Positioning: HOB at 30-45 degrees     Problem: Chest Pain  Goal: Resolution of Chest Pain Symptoms  Outcome: Progressing     Problem: Fall Injury Risk  Goal: Absence of Fall and Fall-Related Injury  Outcome: Progressing  Intervention: Identify and Manage Contributors  Recent Flowsheet Documentation  Taken 10/24/2024 0600 by Marlene Abarca RN  Medication Review/Management: medications reviewed  Taken 10/24/2024 0500 by Marlene Abarca RN  Medication Review/Management: medications reviewed  Taken 10/24/2024 0400 by Marlene Abarca RN  Medication Review/Management: medications reviewed  Taken 10/24/2024 0300 by Marlene Abarca RN  Medication Review/Management: medications reviewed  Taken 10/24/2024  0200 by Marlene Abarca RN  Medication Review/Management: medications reviewed  Taken 10/24/2024 0100 by Marlene Abarca RN  Medication Review/Management: medications reviewed  Taken 10/24/2024 0000 by Marlene Abarca RN  Medication Review/Management: medications reviewed  Taken 10/23/2024 2300 by Marlene Abarca RN  Medication Review/Management: medications reviewed  Taken 10/23/2024 2200 by Marlene Abarca RN  Medication Review/Management: medications reviewed  Taken 10/23/2024 2100 by Marlene Abarca RN  Medication Review/Management: medications reviewed  Taken 10/23/2024 2000 by Marlene Abarca RN  Medication Review/Management: medications reviewed  Intervention: Promote Injury-Free Environment  Recent Flowsheet Documentation  Taken 10/24/2024 0600 by Marlene Abarca RN  Safety Promotion/Fall Prevention:   safety round/check completed   room organization consistent   lighting adjusted   clutter free environment maintained  Taken 10/24/2024 0500 by Marlene Abarca RN  Safety Promotion/Fall Prevention:   safety round/check completed   room organization consistent   lighting adjusted   clutter free environment maintained  Taken 10/24/2024 0400 by Marlene Abarca RN  Safety Promotion/Fall Prevention:   safety round/check completed   room organization consistent   lighting adjusted   clutter free environment maintained  Taken 10/24/2024 0300 by Marlene Abarca RN  Safety Promotion/Fall Prevention:   safety round/check completed   room organization consistent   lighting adjusted   clutter free environment maintained  Taken 10/24/2024 0200 by Marlene Abarca RN  Safety Promotion/Fall Prevention:   safety round/check completed   room organization consistent   lighting adjusted   clutter free environment maintained  Taken 10/24/2024 0100 by Marlene Abarca RN  Safety Promotion/Fall Prevention:   safety round/check completed   room organization consistent   lighting adjusted   clutter free environment maintained  Taken 10/24/2024 0000  by Tevin, Marlene, RN  Safety Promotion/Fall Prevention:   safety round/check completed   room organization consistent   lighting adjusted   clutter free environment maintained  Taken 10/23/2024 2300 by Marlene Abarca RN  Safety Promotion/Fall Prevention:   safety round/check completed   room organization consistent   lighting adjusted   clutter free environment maintained  Taken 10/23/2024 2200 by Marlene Abarca RN  Safety Promotion/Fall Prevention:   safety round/check completed   room organization consistent   lighting adjusted   clutter free environment maintained  Taken 10/23/2024 2100 by Marlene Abarca RN  Safety Promotion/Fall Prevention:   safety round/check completed   room organization consistent   lighting adjusted   clutter free environment maintained  Taken 10/23/2024 2000 by Marlene Abarca RN  Safety Promotion/Fall Prevention:   safety round/check completed   room organization consistent   lighting adjusted   clutter free environment maintained     Problem: Enteral Nutrition  Goal: Absence of Aspiration Signs and Symptoms  Outcome: Progressing  Intervention: Minimize Aspiration Risk  Recent Flowsheet Documentation  Taken 10/24/2024 0200 by Marlene Abarca RN  Head of Bed (HOB) Positioning: HOB at 30-45 degrees  Taken 10/24/2024 0000 by Marlene Abarca RN  Head of Bed (HOB) Positioning: HOB at 30-45 degrees  Taken 10/23/2024 2200 by Marlene Abarca RN  Head of Bed (HOB) Positioning: HOB at 30-45 degrees  Taken 10/23/2024 2100 by Marlene Abarca RN  Oral Care:   teeth brushed - suction toothbrush   swabbed with antiseptic solution  Taken 10/23/2024 2000 by Marlene Abarca RN  Head of Bed (HOB) Positioning: HOB at 30-45 degrees  Goal: Safe, Effective Therapy Delivery  Outcome: Progressing  Goal: Feeding Tolerance  Outcome: Progressing

## 2024-10-24 NOTE — PROGRESS NOTES
[facility] Progress Note       Encounter Date:10/24/24  Patient:Albina Sosa  :1965  MRN:[id number]      Chief Complaint: Follow-up STEMI      Subjective:        Patient remains intubated but doing better with sedation weaned today.  Heart rate is improved.  Still concerns over her ability to follow commands right-sided hemiplegia      Review of Systems:  Review of Systems   Unable to perform ROS: Intubated       Medications:  Scheduled Meds:  aspirin, 81 mg, Nasogastric, Daily  atorvastatin, 40 mg, Oral, Nightly  chlorhexidine, 15 mL, Mouth/Throat, Q12H  clopidogrel, 75 mg, Nasogastric, Daily  famotidine, 20 mg, Intravenous, Daily  insulin glargine, 20 Units, Subcutaneous, Q12H  insulin regular, 4-24 Units, Subcutaneous, Q4H  ipratropium-albuterol, 3 mL, Nebulization, 4x Daily - RT  piperacillin-tazobactam, 3.375 g, Intravenous, Q8H  potassium chloride, 40 mEq, Oral, Q4H  sodium chloride, 10 mL, Intravenous, Q12H    Continuous Infusions:  niCARdipine, 5-15 mg/hr, Last Rate: Stopped (10/22/24 1832)  propofol, 5-50 mcg/kg/min, Last Rate: Stopped (10/23/24 0830)    PRN Meds:    acetaminophen    Calcium Replacement - Follow Nurse / BPA Driven Protocol    dextrose    dextrose    fentaNYL citrate (PF) **OR** fentaNYL citrate (PF)    glucagon (human recombinant)    hydrALAZINE    HYDROcodone-acetaminophen    Magnesium Standard Dose Replacement - Follow Nurse / BPA Driven Protocol    nitroglycerin    OLANZapine    ondansetron ODT **OR** ondansetron    Phosphorus Replacement - Follow Nurse / BPA Driven Protocol    Potassium Replacement - Follow Nurse / BPA Driven Protocol    Potassium Replacement - Follow Nurse / BPA Driven Protocol    [COMPLETED] Insert Peripheral IV **AND** sodium chloride    sodium chloride         Objective:       Vitals:    10/24/24 0743 10/24/24 0749 10/24/24 0800 10/24/24 0815   BP:   (!) 89/62 93/70   BP Location:       Patient Position:       Pulse: 102 101 102 102    Resp: 14 13     Temp:       TempSrc:       SpO2: 100% 100% 100% 100%   Weight:       Height:               Physical Exam:  Constitutional: Chronically ill-appearing, frail, no acute distress   HENT: Oropharynx clear and membrane moist, ET tube in place NG in place  Eyes: Normal conjunctiva, no sclera icterus.  Neck: Supple, no carotid bruit bilaterally.  Cardiovascular: Regular and Tachycardia rate and rhythm, No Murmur, No bilateral lower extremity edema.  Pulmonary: Normal respiratory effort, coarse lung sounds, no wheezing.  Abdominal: Soft, nontender, no hepatosplenomegaly, liver is non-pulsatile.  Neurological: Still fairly sleepy not following commands well  Skin: Warm, dry, no ecchymosis, no rash.  Psych: Unable to assess.           Lab Review:   Results from last 7 days   Lab Units 10/24/24  0333 10/23/24  2258 10/23/24  0236 10/22/24  2253 10/22/24  1939 10/22/24  1150   SODIUM mmol/L 136  --  139  --  129* 125*   POTASSIUM mmol/L 3.6 3.8 3.5  --  4.3 5.1   CHLORIDE mmol/L 100  --  101  --  90* 86*   CO2 mmol/L 24.8  --  25.4  --  23.0 24.3   BUN mg/dL 30*  --  21*  --  22* 22*   CREATININE mg/dL 1.63*  --  1.54*  --  1.47* 1.35*   GLUCOSE mg/dL 178*  --  169* 419* 699* 803*   CALCIUM mg/dL 9.4  --  11.0*  --  11.3* 10.2   AST (SGOT) U/L  --   --   --   --  138* 80*   ALT (SGPT) U/L  --   --   --   --  39* 41*     Results from last 7 days   Lab Units 10/22/24  1646 10/22/24  1150   HSTROP T ng/L >10,000* 3,432*     Results from last 7 days   Lab Units 10/24/24  0333 10/23/24  0236 10/22/24  1646 10/22/24  1637 10/22/24  1150   WBC 10*3/mm3 15.34* 14.22* 17.94*  --  13.48*   HEMOGLOBIN g/dL 12.2 14.8 15.2  --  15.9   HEMOGLOBIN, POC g/dL  --   --   --  16.6  --    HEMATOCRIT % 36.5 43.0 47.2*  --  48.4*   HEMATOCRIT POC %  --   --   --  49  --    PLATELETS 10*3/mm3 304 397 361  --  363     Results from last 7 days   Lab Units 10/23/24  0843 10/22/24  1722 10/22/24  1410 10/22/24  1150   INR   --   --   --   1.02   APTT seconds 23.6 49.8* 133.1* 22.4*         Results from last 7 days   Lab Units 10/23/24  0236   CHOLESTEROL mg/dL 245*   TRIGLYCERIDES mg/dL 311*   HDL CHOL mg/dL 37*     Results from last 7 days   Lab Units 10/22/24  1150   PROBNP pg/mL 26,236.0*           Echocardiogram 10/23/2024 images reviewed by myself:  Left ventricular systolic function is severely decreased. Calculated left ventricular EF = 14.2%, visually estimated LVEF 20-25%.    There is global hypokinesis.  Additionally, the following left ventricular wall segments are akinetic: basal anterolateral, mid anterolateral, apical lateral, basal inferolateral, mid inferolateral and apex.    Left ventricular wall thickness is consistent with moderate concentric hypertrophy.    Left ventricular mass index is increased.  May consider infiltrative cardiomyopathy in the appropriate clinical setting.    No obvious LV thrombus noted including on echo contrast images.    Mild-moderate mitral regurgitation.    Borderline left atrial enlargement, normal RA and IVC size.    There is a small (<1cm) pericardial effusion. There is no evidence of cardiac tamponade.       Cardiac Catheterization 10/22/2024:  Severe multivessel coronary artery disease with 100% ostial LAD, circumflex contains 100% stenosis of the mid marginal branch at the distal stent edge likely culprit for STEMI presentation, left-sided PDA from the circumflex has 100% proximal segment stenoses with left to left collaterals from a LIMA to LAD supplying the distal PDA, patent LIMA to LAD LAD is diffusely diseased small in caliber size with sequential 80% stenoses.  LVEDP of approximately 30 mmHg  Ejection fraction 10%  Successful PCI to mid marginal with placement of a 2.25 x 12 mm Xience drug-eluting stent deployed initially at 8 keon with her stent balloon back slightly postdilated to 16 at  [place] to right femoral artery  Patient developed acute neurologic changes at the end of the case when  we went to load her with aspirin and Plavix.  She was noted to be fairly unresponsive even after reversal with Narcan and flumazenil code stroke was called she was assessed emergently and taken off her head CT       Assessment:          Diagnosis Plan   1. Acute ST elevation myocardial infarction (STEMI), unspecified artery  Ambulatory Referral to Cardiac Rehab      2. Smoker        3. H/O medication noncompliance        4. Acute pulmonary edema               Plan:       Ms. Sosa is a 59 y.o. woman with past medical history notable for coronary disease status post LIMA to LAD percutaneous interventions to her circumflex and left-sided PDA, hypertension, mixed hyperlipidemia, diabetes type 2 on insulin therapy, history of stroke, history of methamphetamine use, and ongoing tobacco abuse with underlying lung disease who presented to the emergency room with a day or 2 of acute onset chest discomfort on 10/22 was noted to have ST changes concerning for STEMI.  She was taken emergently to the Cath Lab where she was found to have multivessel disease some chronic and on her mid marginal branch which fit with her EKG changes was felt to be the acute lesion and behaved so.  This was revascularized and stent placed.  She was also noted to have occlusion of her left dominant circumflex however this behave more chronic given that there was already collateralization and wire would not pass.  She was also found to have severe cardiomyopathy which is new compared to at least stress findings from 2 years ago.  Unfortunately patient had a stroke symptoms at the end of the case she was taken emergently had TNK and subsequently taken for thrombectomy despite that still had a fairly sizable stroke on the left side.  She has been intubated since her procedure given that she was somewhat combative and needed paralytics for her thrombectomy.  Currently attempts are made to try and wean sedation this morning.  She remains critically ill  echocardiogram is pending this morning unclear etiology for her stroke but left ventricular thrombus could be a high possibility given her low ejection fraction.  From a cardiac standpoint hemodynamically is doing okay but remains critically ill with her known ejection fraction of 14%.  She does have better heart rate this morning.  At this juncture would be little concerned on being too aggressive with titrating on optimal medical therapy for her heart failure given her somewhat decompensated state and critical illness.  Might be able to add on a very low-dose metoprolol as the days go on.  From a volume status she seems to be doing better her chest x-ray looks better and again would be careful with overdiuresis.  Blood pressure remains low.  ICU working on hopefully getting her extubated.  Will try and keep  updated sounds like there is also a daughter who is planning on coming today as well    STEMI:  Mid marginal branch felt to be culprit however patient with severe chronic disease throughout the LAD and circumflex and left-sided PDA  Status post PCI 10/22  Was loaded with aspirin and clopidogrel in the Cath Lab 10/2022  Continue aspirin  Will monitor heart rate may add on low-dose beta-blocker later today or tomorrow  Would like to continue aspirin and Plavix   Follow-up echocardiogram does not show clear evidence of left ventricular thrombus    Stroke:  Likely cardioembolic unclear if from left ventricular thrombus or clot on the catheter  Status post TNK and thrombectomy  Neurology following  Now on aspirin and clopidogrel    Acute systolic congestive heart failure:  Severely elevated LVEDP of 40 mmHg in the Cath Lab  Did receive diuretic her chest x-ray is looking better  Renal function elevated but stable  Sodium is also improved with correcting blood sugars    Diabetes type 2:   Elevated blood sugars noted but no evidence of acidosis think it is more of a hyperglycemia  Appreciate critical care  assistance with managing her severe hyperglycemia     Acute hyponatremia:  Has improved could be related to heart failure versus hyperglycemia  Had issues with this in the past  Now corrected             Bret Lawrence MD  [facility]  10/24/24  08:46 EDT

## 2024-10-24 NOTE — PROGRESS NOTES
[facility]   Surgical Progress Note    Patient Name: Ablina Sosa  : 1965  MRN: [id number]  Date of admission: 10/22/2024  Surgical Procedures Since Admission:  Procedure(s):  Left Heart Cath  Stent LAM coronary  Percutaneous Coronary Intervention  Coronary angiography  Surgeon:  Bret Lawrence MD  Status:  2 Days Post-Op  -------------------    Procedure(s):  EMBOLECTOMY MECHANICAL  Surgeon:  Jeremiah Carey MD  Status:  2 Days Post-Op  -------------------    Subjective   Subjective     Chief Complaint: Cool extremity follow-up.    Shortness of Breath  Associated symptoms include chest pain.   Chest Pain   Associated symptoms include shortness of breath.      Patient remains intubated on the mechanical ventilator. Responds to stimulation but not to commands this morning.     Review of Systems   Respiratory:  Positive for shortness of breath.    Cardiovascular:  Positive for chest pain.       Objective   Objective     Vitals:   Temp:  [97.8 °F (36.6 °C)-100.2 °F (37.9 °C)] 99 °F (37.2 °C)  Heart Rate:  [] 100  Resp:  [13-19] 15  BP: ()/() 91/61  FiO2 (%):  [30 %-31 %] 31 %  Output by Drain (mL) 10/23/24 0701 - 10/23/24 1900 10/23/24 1901 - 10/24/24 0700 10/24/24 0701 - 10/24/24 1518 Range Total   Urethral Catheter Straight-tip;Silicone 16 Fr. 300 [phone]       Physical Exam  Constitutional:       Appearance: She is well-developed.   Pulmonary:      Effort: Pulmonary effort is normal. No respiratory distress.   Abdominal:      General: There is no distension.      Palpations: Abdomen is soft.   Neurological:      Mental Status: She is alert and oriented to person, place, and time.       Brisk DP and PT signals bilaterally  Feet/toes warm and well-perfused with normal cap refill  Bilateral groin access sites are soft with no palpable pulsatile mass or large hematoma.  Dressings intact with minimal strikethrough      Result Review    Result Review:  I have personally reviewed  the results from the time of this admission to 10/24/2024 15:18 EDT and agree with these findings:  [x]  Laboratory list / accordion  []  Microbiology  []  Radiology  []  EKG/Telemetry   []  Cardiology/Vascular   []  Pathology  []  Old records  []  Other:  Most notable findings include: hemoglobin A1c 14. Wbc 15k.       Assessment & Plan   Assessment / Plan     Brief Patient Summary:  Albina Sosa is a 59 y.o. female who with multisystem organ failure with recent PCI yesterday for heart attack as well as a recent percutaneous embolectomy for stroke. She has some questionable hemorrhagic conversion in the large stroke on post embolectomy MRI. Neurology's concern for hemorrhagic conversion. No anticoagulation at the current time.     Active Hospital Problems:   Active Hospital Problems    Diagnosis     **STEMI involving left circumflex coronary artery     Acute ischemic left middle cerebral artery (MCA) stroke      Plan:   10/23/2024: Continue supportive care.  Nothing to offer at the current time from a vascular surgical standpoint(current critical illness with contraindication anticoagulation).  Her feet appear viable without clinical signs or symptoms of critical limb ischemia.    10/24/2024: Exam considerably better today.  Has DP and PT signals bilaterally and feet are warm and well-perfused with normal cap refill.  No evidence of complication at groin access sites on exam.  I do not think she needs further vascular surgery workup or intervention.  Will see patient as needed for now. Please do not hesitate to call with any questions or concerns.       VTE Prophylaxis:  Pharmacologic & mechanical VTE prophylaxis orders are present.        Jasbir Lin II, MD  10/24/24  15:19 EDT  Office Number [phone]    [facility]

## 2024-10-24 NOTE — PROGRESS NOTES
[facility]  [phone]  Dr. Se Avitia    Subjective:  LOS: 2    Chief Complaint: Acute stroke    Poorly responsive on the ventilator.    Objective   Vital Signs past 24hrs  Temp range: Temp (24hrs), Av.2 °F (37.3 °C), Min:97.8 °F (36.6 °C), Max:100.2 °F (37.9 °C)    BP range: BP: ()/() 91/61  Pulse range: Heart Rate:  [] 100  Resp rate range: Resp:  [13-19] 15  Device (Oxygen Therapy): ventilator   Oxygen range:SpO2:  [94 %-100 %] 97 %   Mechanical Ventilator:Mode: PC/AC (10/24/24 1459)    Physical Exam  Constitutional:       Interventions: She is sedated, intubated and restrained.   Cardiovascular:      Rate and Rhythm: Normal rate and regular rhythm.      Heart sounds: No murmur heard.  Pulmonary:      Effort: She is intubated.      Breath sounds: Decreased breath sounds present.   Abdominal:      General: Bowel sounds are normal.      Palpations: Abdomen is soft.      Tenderness: There is no abdominal tenderness.   Musculoskeletal:         General: No swelling.   Neurological:      Comments: Right hemiplegia       Results Review:    I have reviewed the laboratory and imaging data since the last note by [facility] physician.  My annotations are noted in assessment and plan.      Result Review:  I have personally reviewed the results from last note by [facility] physician to 10/24/2024 15:36 EDT and agree with these findings:  [x]  Laboratory list / accordion  [x]  Microbiology  [x]  Radiology  []  EKG/Telemetry   []  Cardiology/Vascular   []  Pathology  []  Old records  []  Other:      Medication Review:  I have reviewed the current MAR.  My annotations are noted in assessment and plan.    aspirin, 81 mg, Nasogastric, Daily  atorvastatin, 40 mg, Oral, Nightly  chlorhexidine, 15 mL, Mouth/Throat, Q12H  clopidogrel, 75 mg, Nasogastric, Daily  enoxaparin, 30 mg, Subcutaneous, Nightly  [START ON 10/25/2024] famotidine, 20 mg, Nasogastric, Daily  insulin glargine, 20 Units, Subcutaneous,  Q12H  insulin regular, 4-24 Units, Subcutaneous, Q4H  ipratropium-albuterol, 3 mL, Nebulization, 4x Daily - RT  piperacillin-tazobactam, 3.375 g, Intravenous, Q8H  sodium chloride, 10 mL, Intravenous, Q12H        niCARdipine, 5-15 mg/hr, Last Rate: Stopped (10/22/24 1832)  propofol, 5-50 mcg/kg/min, Last Rate: Stopped (10/23/24 0830)      Lines, Drains & Airways       Active LDAs       Name Placement date Placement time Site Days    Peripheral IV 10/22/24 1155 Anterior;Left;Upper Arm 10/22/24  1155  Arm  1    Peripheral IV 10/22/24 1645 Anterior;Right 10/22/24  1645  --  less than 1    Peripheral IV 10/22/24 1645 Left;Posterior Forearm 10/22/24  1645  Forearm  less than 1    Urethral Catheter Straight-tip;Silicone 16 Fr. 10/22/24  1553  -- less than 1    ETT  10/22/24  1445  created via procedure documentation  -- 1                  Isolation status: No active isolations    Dietary Orders (From admission, onward)       Start     Ordered    10/24/24 1102  Tube Feeding: Formula: Diabetisource AC (Glucerna 1.2); Feeding Type: Continuous; Start at: 20 mL/hr; Then Advance By: 20 mL/hr; Every: 4 hours; To Goal Rate of: 55 mL/hr; Water Flush: 30 mL; Every: 4 hours; Water Bolus: None  (Tube Feeding (RD to Initiate & Manage) + NPO)  Diet Effective Now        Question Answer Comment   Formula Diabetisource AC (Glucerna 1.2)    Feeding Type Continuous    Start at 20 mL/hr    Then Advance By 20 mL/hr    Every 4 hours    To Goal Rate of 55 mL/hr    Water Flush 30 mL    Every 4 hours    Water Bolus None        10/24/24 1101    10/23/24 1542  NPO Diet NPO Type: Tube Feeding  (Tube Feeding (RD to Initiate & Manage) + NPO)  Diet Effective Now        Question:  NPO Type  Answer:  Tube Feeding    10/23/24 1542    10/22/24 1634  Patient is on Glucommander  Continuous        Question Answer Comment   Tray Note: Glucommander    Patient is on Glucommander: Yes        10/22/24 1633                    PCCM Problems  Acute left MCA ischemic  stroke, status post TNK and thrombectomy  Acute STEMI status post primary PCI  Acute respiratory failure requiring invasive mechanical ventilation  CAD with history CABG and prior stents  Ischemic cardiomyopathy with EF 10%  Uncontrolled hyperglycemia with blood sugar very high  Type 2 diabetes mellitus  COPD  Current cigarette smoker  Obesity  Hyponatremia  Elevated liver enzymes  NICHO  Fever  Positive UDS  Peripheral vascular disease    Plan of Treatment    Acute left MCA stroke with right hemiplegia.  Discussed with neurology this morning.  Now on aspirin and Plavix and tolerating.    Acute STEMI and primary PCI.  Back on DAPT.    Acute respiratory failure and remains on mechanical ventilation.  She is aphasic and does not follow commands.  I am unsure of her ability to protect her airway at this time.  So far she has not been able to wean.    Ischemic cardiomyopathy and with EF 10%.  No evidence of fluid overload so far.  Cardiology is following.    NICHO somewhat worse.  Will add some fluids via cortrack, caution with cardiomyopathy and EF 10%.  Consider IV fluids.  Again so far no evidence of fluid overload on chest x-ray.    Blood sugar controlled with sliding scale.  Has poorly controlled diabetes mellitus.    Started tube feeds.    COPD without current wheezing.  Continue nebs.    Patient needs counseling to quit smoke prior to discharge    Peripheral vascular disease and appreciate Dr. Lin input.  Improved circulation.    Liver enzymes are better.    Evidence hyperlipidemia on lipid panel.    Patient was febrile.  Pancultures sent and patient started on Zosyn.    UDS positive on admission.    I spent 35 mins critical care time in care of this patient outside of any procedures and not overlapping with any other provider.     Se Avitia MD  10/24/24  15:36 EDT      Part of this note may be an electronic transcription/translation of spoken language to printed text using the Dragon Dictation System.

## 2024-10-25 NOTE — PROGRESS NOTES
[facility] Progress Note       Encounter Date:10/25/24  Patient:Albina Sosa  :1965  MRN:[id number]      Chief Complaint: Follow-up STEMI      Subjective:        Patient extubated now breathing on her own still not following commands well.  Heart rate is increased now that she is extubated.      Review of Systems:  Review of Systems   Unable to perform ROS: Patient nonverbal       Medications:  Scheduled Meds:  aspirin, 81 mg, Nasogastric, Daily  atorvastatin, 40 mg, Oral, Nightly  chlorhexidine, 15 mL, Mouth/Throat, Q12H  clopidogrel, 75 mg, Nasogastric, Daily  enoxaparin, 30 mg, Subcutaneous, Nightly  famotidine, 20 mg, Nasogastric, Daily  insulin glargine, 25 Units, Subcutaneous, Q12H  insulin regular, 4-24 Units, Subcutaneous, Q4H  ipratropium-albuterol, 3 mL, Nebulization, 4x Daily - RT  metoprolol tartrate, 12.5 mg, Oral, Q12H  piperacillin-tazobactam, 3.375 g, Intravenous, Q8H    Continuous Infusions:  niCARdipine, 5-15 mg/hr, Last Rate: Stopped (10/22/24 1832)    PRN Meds:    acetaminophen    Calcium Replacement - Follow Nurse / BPA Driven Protocol    dextrose    dextrose    fentaNYL citrate (PF) **OR** [DISCONTINUED] fentaNYL citrate (PF)    glucagon (human recombinant)    hydrALAZINE    HYDROcodone-acetaminophen    Magnesium Standard Dose Replacement - Follow Nurse / BPA Driven Protocol    nitroglycerin    OLANZapine    ondansetron ODT **OR** ondansetron    Phosphorus Replacement - Follow Nurse / BPA Driven Protocol    Potassium Replacement - Follow Nurse / BPA Driven Protocol    Potassium Replacement - Follow Nurse / BPA Driven Protocol    [COMPLETED] Insert Peripheral IV **AND** sodium chloride         Objective:       Vitals:    10/25/24 1300 10/25/24 1330 10/25/24 1345 10/25/24 1405   BP: 118/79 119/93  121/92   BP Location:       Patient Position:       Pulse: 112 (!) 121 (!) 140 120   Resp:       Temp:       TempSrc:       SpO2: 94% 92% 95% 92%   Weight:       Height:                Physical Exam:  Constitutional: Chronically ill-appearing, frail, no acute distress   HENT: Oropharynx clear and membrane moist, NG in place  Eyes: Normal conjunctiva, no sclera icterus.  Neck: Supple, no carotid bruit bilaterally.  Cardiovascular: Regular and Tachycardia rate and rhythm, No Murmur, No bilateral lower extremity edema.  Pulmonary: Normal respiratory effort, coarse lung sounds, no wheezing.  Abdominal: Soft, nontender, no hepatosplenomegaly, liver is non-pulsatile.  Neurological: Still fairly sleepy not following commands well  Skin: Warm, dry, no ecchymosis, no rash.  Psych: Unable to assess.           Lab Review:   Results from last 7 days   Lab Units 10/25/24  0404 10/24/24  1502 10/24/24  0333 10/23/24  2258 10/23/24  0236 10/22/24  2253 10/22/24  1939 10/22/24  1150   SODIUM mmol/L 144  --  136  --  139  --  129* 125*   POTASSIUM mmol/L 4.0 4.9 3.6 3.8 3.5  --  4.3 5.1   CHLORIDE mmol/L 111*  --  100  --  101  --  90* 86*   CO2 mmol/L 23.7  --  24.8  --  25.4  --  23.0 24.3   BUN mg/dL 34*  --  30*  --  21*  --  22* 22*   CREATININE mg/dL 1.64*  --  1.63*  --  1.54*  --  1.47* 1.35*   GLUCOSE mg/dL 166*  --  178*  --  169* 419* 699* 803*   CALCIUM mg/dL 8.4*  --  9.4  --  11.0*  --  11.3* 10.2   AST (SGOT) U/L  --   --  58*  --   --   --  138* 80*   ALT (SGPT) U/L  --   --  24  --   --   --  39* 41*     Results from last 7 days   Lab Units 10/22/24  1646 10/22/24  1150   HSTROP T ng/L >10,000* 3,432*     Results from last 7 days   Lab Units 10/25/24  0404 10/24/24  0333 10/23/24  0236 10/22/24  1646 10/22/24  1637 10/22/24  1150   WBC 10*3/mm3 10.98* 15.34* 14.22* 17.94*  --  13.48*   HEMOGLOBIN g/dL 11.1* 12.2 14.8 15.2  --  15.9   HEMOGLOBIN, POC g/dL  --   --   --   --  16.6  --    HEMATOCRIT % 34.0 36.5 43.0 47.2*  --  48.4*   HEMATOCRIT POC %  --   --   --   --  49  --    PLATELETS 10*3/mm3 316 304 397 361  --  363     Results from last 7 days   Lab Units 10/23/24  0819  10/22/24  1722 10/22/24  1410 10/22/24  1150   INR   --   --   --  1.02   APTT seconds 23.6 49.8* 133.1* 22.4*         Results from last 7 days   Lab Units 10/23/24  0236   CHOLESTEROL mg/dL 245*   TRIGLYCERIDES mg/dL 311*   HDL CHOL mg/dL 37*     Results from last 7 days   Lab Units 10/22/24  1150   PROBNP pg/mL 26,236.0*           Echocardiogram 10/23/2024:  Left ventricular systolic function is severely decreased. Calculated left ventricular EF = 14.2%, visually estimated LVEF 20-25%.    There is global hypokinesis.  Additionally, the following left ventricular wall segments are akinetic: basal anterolateral, mid anterolateral, apical lateral, basal inferolateral, mid inferolateral and apex.    Left ventricular wall thickness is consistent with moderate concentric hypertrophy.    Left ventricular mass index is increased.  May consider infiltrative cardiomyopathy in the appropriate clinical setting.    No obvious LV thrombus noted including on echo contrast images.    Mild-moderate mitral regurgitation.    Borderline left atrial enlargement, normal RA and IVC size.    There is a small (<1cm) pericardial effusion. There is no evidence of cardiac tamponade.       Cardiac Catheterization 10/22/2024:  Severe multivessel coronary artery disease with 100% ostial LAD, circumflex contains 100% stenosis of the mid marginal branch at the distal stent edge likely culprit for STEMI presentation, left-sided PDA from the circumflex has 100% proximal segment stenoses with left to left collaterals from a LIMA to LAD supplying the distal PDA, patent LIMA to LAD LAD is diffusely diseased small in caliber size with sequential 80% stenoses.  LVEDP of approximately 30 mmHg  Ejection fraction 10%  Successful PCI to mid marginal with placement of a 2.25 x 12 mm Xience drug-eluting stent deployed initially at 8 keon with her stent balloon back slightly postdilated to 16 at  [place] to right femoral artery  Patient developed acute  neurologic changes at the end of the case when we went to load her with aspirin and Plavix.  She was noted to be fairly unresponsive even after reversal with Narcan and flumazenil code stroke was called she was assessed emergently and taken off her head CT       Assessment:          Diagnosis Plan   1. Acute ST elevation myocardial infarction (STEMI), unspecified artery  Ambulatory Referral to Cardiac Rehab      2. Smoker        3. H/O medication noncompliance        4. Acute pulmonary edema               Plan:       Ms. Sosa is a 59 y.o. woman with past medical history notable for coronary disease status post LIMA to LAD percutaneous interventions to her circumflex and left-sided PDA, hypertension, mixed hyperlipidemia, diabetes type 2 on insulin therapy, history of stroke, history of methamphetamine use, and ongoing tobacco abuse with underlying lung disease who presented to the emergency room with a day or 2 of acute onset chest discomfort on 10/22 was noted to have ST changes concerning for STEMI.  She was taken emergently to the Cath Lab where she was found to have multivessel disease some chronic and on her mid marginal branch which fit with her EKG changes was felt to be the acute lesion and behaved so.  This was revascularized and stent placed.  She was also noted to have occlusion of her left dominant circumflex however this behave more chronic given that there was already collateralization and wire would not pass.  She was also found to have severe cardiomyopathy which is new compared to at least stress findings from 2 years ago.  Unfortunately patient had a stroke symptoms at the end of the case she was taken emergently had TNK and subsequently taken for thrombectomy despite that still had a fairly sizable stroke on the left side.  She has been intubated since her procedure given that she was somewhat combative and needed paralytics for her thrombectomy.  Currently attempts are made to try and wean  sedation this morning.  She remains critically ill echocardiogram is pending this morning unclear etiology for her stroke but left ventricular thrombus could be a high possibility given her low ejection fraction.  From a cardiac standpoint hemodynamically is doing okay but remains critically ill with her known ejection fraction of 14%.  She does have better heart rate this morning.  At this juncture would be little concerned on being too aggressive with titrating on optimal medical therapy for her heart failure given her somewhat decompensated state and critical illness.  Was at least able to get extubated today unfortunately still remains fairly weak and severe neurodeficit.  Heart rate is elevated and adding on low-dose metoprolol to see how she does.  Continue to titrate this.  Critical care is also concerned that there might be a component of withdrawal after watch out for that as well.    STEMI:  Mid marginal branch felt to be culprit however patient with severe chronic disease throughout the LAD and circumflex and left-sided PDA  Status post PCI 10/22  Was loaded with aspirin and clopidogrel in the Cath Lab 10/2022  Continue aspirin  Adding on low-dose metoprolol 12.5 mg we will have to use tartrate given that is going through a core track but can titrate up as tolerated  Would like to continue aspirin and Plavix   Follow-up echocardiogram does not show clear evidence of left ventricular thrombus    Stroke:  Likely cardioembolic unclear if from left ventricular thrombus or clot on the catheter  Status post TNK and thrombectomy  Neurology following  Now on aspirin and clopidogrel    Acute systolic congestive heart failure:  Severely elevated LVEDP of 40 mmHg in the Cath Lab  Did receive diuretic her chest x-ray is looking better  Renal function elevated but stable  Sodium is also improved with correcting blood sugars    Diabetes type 2:   Elevated blood sugars noted but no evidence of acidosis think it is more  of a hyperglycemia  Appreciate critical care assistance with managing her severe hyperglycemia     Acute hyponatremia:  Has improved could be related to heart failure versus hyperglycemia  Had issues with this in the past  Now corrected             Bret Lawrence MD  [facility]  10/25/24  14:19 EDT

## 2024-10-25 NOTE — NURSING NOTE
10/25/24 1001   Wound 10/24/24 1600 Bilateral sacral spine   Placement Date/Time: 10/24/24 1600   Side: Bilateral  Location: sacral spine  Primary Wound Type: Pressure Injury   Dressing Appearance dry;intact   Base purple  (dark purple and brown)   Periwound intact;dry   Drainage Amount none   Dressing Care dressing applied;dressing changed;silicone border foam     WOCN: Patient has been on pressers. She has 2 dark purple brown areas on buttock, Is not over angel prominences. Apply heart shape silicone border dressing. Needs total assist to turn.  Turn every 2 hours. Buttock discoloration due to medications.

## 2024-10-25 NOTE — PLAN OF CARE
Goal Outcome Evaluation:              Outcome Evaluation: Discussed with RN, hold swallow eval at this time. Will check back Monday unless new orders received over the weekend.

## 2024-10-25 NOTE — PROGRESS NOTES
[facility]  [phone]  Dr. Se Avitia    Subjective:  LOS: 3    Chief Complaint: Acute stroke    Examined this morning and is restless when aroused on the ventilator.  Does not open eyes.  However this could be a consequence of her stroke which is left her with aphasia.    Objective   Vital Signs past 24hrs  Temp range: Temp (24hrs), Av.2 °F (37.3 °C), Min:98.3 °F (36.8 °C), Max:100.8 °F (38.2 °C)    BP range: BP: ()/(60-93) 121/92  Pulse range: Heart Rate:  [] 120  Resp rate range: Resp:  [15-25] 24  Device (Oxygen Therapy): nasal cannulaFlow (L/min) (Oxygen Therapy):  [4] 4  Oxygen range:SpO2:  [91 %-100 %] 92 %   Mechanical Ventilator:Mode: PS (PS for an SBT) (10/25/24 0707)    Physical Exam  Constitutional:       Interventions: She is sedated, intubated and restrained.   Cardiovascular:      Rate and Rhythm: Normal rate and regular rhythm.      Heart sounds: No murmur heard.  Pulmonary:      Effort: She is intubated.      Breath sounds: Decreased breath sounds present.   Abdominal:      General: Bowel sounds are normal.      Palpations: Abdomen is soft.      Tenderness: There is no abdominal tenderness.   Musculoskeletal:         General: No swelling.   Neurological:      Comments: Right hemiplegia       Results Review:    I have reviewed the laboratory and imaging data since the last note by [facility] physician.  My annotations are noted in assessment and plan.      Result Review:  I have personally reviewed the results from last note by [facility] physician to 10/25/2024 14:27 EDT and agree with these findings:  [x]  Laboratory list / accordion  [x]  Microbiology  [x]  Radiology  []  EKG/Telemetry   []  Cardiology/Vascular   []  Pathology  []  Old records  []  Other:      Medication Review:  I have reviewed the current MAR.  My annotations are noted in assessment and plan.    aspirin, 81 mg, Nasogastric, Daily  atorvastatin, 40 mg, Oral, Nightly  chlorhexidine, 15 mL, Mouth/Throat,  Q12H  clopidogrel, 75 mg, Nasogastric, Daily  enoxaparin, 30 mg, Subcutaneous, Nightly  famotidine, 20 mg, Nasogastric, Daily  insulin glargine, 25 Units, Subcutaneous, Q12H  insulin regular, 4-24 Units, Subcutaneous, Q4H  ipratropium-albuterol, 3 mL, Nebulization, 4x Daily - RT  metoprolol tartrate, 12.5 mg, Oral, Q12H  piperacillin-tazobactam, 3.375 g, Intravenous, Q8H        niCARdipine, 5-15 mg/hr, Last Rate: Stopped (10/22/24 1832)      Lines, Drains & Airways       Active LDAs       Name Placement date Placement time Site Days    Peripheral IV 10/22/24 1155 Anterior;Left;Upper Arm 10/22/24  1155  Arm  1    Peripheral IV 10/22/24 1645 Anterior;Right 10/22/24  1645  --  less than 1    Peripheral IV 10/22/24 1645 Left;Posterior Forearm 10/22/24  1645  Forearm  less than 1    Urethral Catheter Straight-tip;Silicone 16 Fr. 10/22/24  1553  -- less than 1    ETT  10/22/24  1445  created via procedure documentation  -- 1                  Isolation status: No active isolations    Dietary Orders (From admission, onward)       Start     Ordered    10/24/24 1543  Tube Feeding: Formula: Diabetisource AC (Glucerna 1.2); Feeding Type: Continuous; Start at: 20 mL/hr; Then Advance By: 20 mL/hr; Every: 4 hours; To Goal Rate of: 55 mL/hr; Water Flush: 50 mL; Every: 1 hour; Water Bolus: None  (Tube Feeding (RD to Initiate & Manage) + NPO)  Diet Effective Now        Question Answer Comment   Formula Diabetisource AC (Glucerna 1.2)    Feeding Type Continuous    Start at 20 mL/hr    Then Advance By 20 mL/hr    Every 4 hours    To Goal Rate of 55 mL/hr    Water Flush 50 mL    Every 1 hour    Water Bolus None        10/24/24 1542    10/23/24 1542  NPO Diet NPO Type: Tube Feeding  (Tube Feeding (RD to Initiate & Manage) + NPO)  Diet Effective Now        Question:  NPO Type  Answer:  Tube Feeding    10/23/24 1542                    PCCM Problems  Acute left MCA ischemic stroke, status post TNK and thrombectomy  Acute STEMI status post  primary PCI  Acute respiratory failure requiring invasive mechanical ventilation  CAD with history CABG and prior stents  Ischemic cardiomyopathy with EF 10%  Uncontrolled hyperglycemia with blood sugar very high  Type 2 diabetes mellitus  COPD  Current cigarette smoker  Obesity  Hyponatremia  Elevated liver enzymes  NICHO  Fever  Positive UDS  Peripheral vascular disease    Plan of Treatment    Acute left MCA stroke with right hemiplegia.  Remains on aspirin and Plavix and tolerating.  Neurology would like tighter control of her blood glucose.  Patient is restless after extubation.  Will try adding some Effexor to see if it will help.    Acute STEMI and primary PCI.  Back on DAPT.    Acute respiratory failure and on mechanical ventilation when examined this morning.  She is aphasic and does not follow commands.  This was obtained and patient was successfully extubated after reviewing.    Ischemic cardiomyopathy and with EF 10%.  Cardiology is following.    Fluids via cortrack were increased yesterday for mildly worse NICHO.  However today chest x-ray showing pulmonary vascular congestion.  Will give 40 mg IV Lasix.  Cut down the fluids via cortrack are 20 mL per 2 hours.  Monitor renal function    Blood sugar controlled with sliding scale.  Has poorly controlled diabetes mellitus.  Neurology would like tight control of blood glucose to below 180.  If next blood sugar remains above 180, patient will be restarted on insulin drip.    Tube feeds were initiated which may be contributing to her blood glucose going up.    COPD without current wheezing.  Continue nebs.    Peripheral vascular disease and appreciate Dr. Lin input.  Improved circulation.    Evidence hyperlipidemia on lipid panel.    Patient was febrile.  Pancultures sent and patient started on Zosyn.  Mildly elevated procalcitonin but in the setting of renal dysfunction. So far cultures NG.    UDS positive on admission.  Patient could be withdrawing as  well.    I spent 35 mins critical care time in care of this patient outside of any procedures and not overlapping with any other provider.     Se Avitia MD  10/25/24  14:27 EDT      Part of this note may be an electronic transcription/translation of spoken language to printed text using the Dragon Dictation System.

## 2024-10-25 NOTE — CASE MANAGEMENT/SOCIAL WORK
Continued Stay Note  [facility]     Patient Name: Albina Sosa  MRN: [id number]  Today's Date: 10/25/2024    Admit Date: 10/22/2024    Plan: Pending clinical progress & PT/OT/ST evals ordered 10/25   Discharge Plan       Row Name 10/25/24 1738       Plan    Plan Pending clinical progress & PT/OT/ST evals ordered 10/25    Patient/Family in Agreement with Plan yes    Plan Comments Pt discussed in multidisciplinary rounds. Clinicals reviewed. CCP noted pt extubated today. Pt not yet medically ready for DC; CCP will follow. DC plan pending pt's progress during hospitalization, anticipate need for SNF vs IRF pending progress. DC barriers: PT/OT/ST evals ordered 10/25, imani & samia Shine RN/SHENG Winkler RN

## 2024-10-25 NOTE — PLAN OF CARE
Problem: Adult Inpatient Plan of Care  Goal: Plan of Care Review  Outcome: Progressing  Flowsheets (Taken 10/25/2024 0743)  Progress: no change  Goal: Patient-Specific Goal (Individualized)  Outcome: Progressing  Goal: Absence of Hospital-Acquired Illness or Injury  Outcome: Progressing  Intervention: Identify and Manage Fall Risk  Recent Flowsheet Documentation  Taken 10/25/2024 0400 by Nelida Hernandez RN  Safety Promotion/Fall Prevention:   clutter free environment maintained   fall prevention program maintained   lighting adjusted   room organization consistent   safety round/check completed  Taken 10/25/2024 0200 by Nelida Hernandez RN  Safety Promotion/Fall Prevention:   clutter free environment maintained   fall prevention program maintained   lighting adjusted   room organization consistent   safety round/check completed  Taken 10/25/2024 0000 by Nelida Hernandez RN  Safety Promotion/Fall Prevention:   clutter free environment maintained   fall prevention program maintained   lighting adjusted   room organization consistent   safety round/check completed  Taken 10/24/2024 2200 by Nelida Hernandez RN  Safety Promotion/Fall Prevention:   clutter free environment maintained   fall prevention program maintained   lighting adjusted   room organization consistent   safety round/check completed  Taken 10/24/2024 2000 by Nelida Hernandez RN  Safety Promotion/Fall Prevention:   clutter free environment maintained   fall prevention program maintained   lighting adjusted   room organization consistent   safety round/check completed  Intervention: Prevent Skin Injury  Recent Flowsheet Documentation  Taken 10/25/2024 0400 by Nelida Hernandez RN  Body Position:   turned   supine  Skin Protection: pectin skin barriers applied  Taken 10/25/2024 0200 by Nelida Hernandez RN  Body Position:   turned   right   neutral body alignment   lower extremity elevated   neutral head position   upper extremity elevated  Taken  10/25/2024 0000 by Nelida Hernandez RN  Body Position:   turned   supine  Skin Protection:   incontinence pads utilized   transparent dressing maintained   skin sealant/moisture barrier applied  Taken 10/24/2024 2200 by Nelida Hernandez RN  Body Position:   turned   left   upper extremity elevated  Taken 10/24/2024 2000 by Nelida Hernandez RN  Body Position:   supine   upper extremity elevated  Skin Protection:   incontinence pads utilized   transparent dressing maintained  Intervention: Prevent and Manage VTE (Venous Thromboembolism) Risk  Recent Flowsheet Documentation  Taken 10/25/2024 0000 by Nelida Hernandez RN  VTE Prevention/Management:   bilateral   SCDs (sequential compression devices) on  Intervention: Prevent Infection  Recent Flowsheet Documentation  Taken 10/25/2024 0400 by Nelida eHrnandez RN  Infection Prevention:   environmental surveillance performed   hand hygiene promoted   rest/sleep promoted   single patient room provided  Taken 10/25/2024 0200 by Nelida Hernandez RN  Infection Prevention:   environmental surveillance performed   hand hygiene promoted   rest/sleep promoted   single patient room provided  Taken 10/25/2024 0000 by Nelida Hernandez RN  Infection Prevention:   environmental surveillance performed   hand hygiene promoted   single patient room provided   visitors restricted/screened  Taken 10/24/2024 2200 by Nelida Hernandez RN  Infection Prevention:   environmental surveillance performed   hand hygiene promoted   single patient room provided   rest/sleep promoted  Taken 10/24/2024 2000 by Nelida Hernandez RN  Infection Prevention:   environmental surveillance performed   personal protective equipment utilized   single patient room provided  Goal: Optimal Comfort and Wellbeing  Outcome: Progressing  Intervention: Monitor Pain and Promote Comfort  Recent Flowsheet Documentation  Taken 10/25/2024 0600 by Nelida Hernandez RN  Pain Management Interventions:   position adjusted   pain  medication given  Intervention: Provide Person-Centered Care  Recent Flowsheet Documentation  Taken 10/25/2024 0400 by Nelida Hernandez RN  Trust Relationship/Rapport: care explained  Taken 10/25/2024 0000 by Nelida Hernandez RN  Trust Relationship/Rapport: care explained  Taken 10/24/2024 2000 by Nelida Hernandez RN  Trust Relationship/Rapport: care explained  Goal: Readiness for Transition of Care  Outcome: Progressing     Problem: Skin Injury Risk Increased  Goal: Skin Health and Integrity  Outcome: Progressing  Intervention: Optimize Skin Protection  Recent Flowsheet Documentation  Taken 10/25/2024 0400 by Nelida Hernandez RN  Pressure Reduction Techniques: weight shift assistance provided  Head of Bed (HOB) Positioning: HOB at 30 degrees  Pressure Reduction Devices: specialty bed utilized  Skin Protection: pectin skin barriers applied  Taken 10/25/2024 0200 by Nelida Hernandez RN  Head of Bed ([facility]) Positioning: HOB at 30 degrees  Taken 10/25/2024 0000 by Nelida Hernandez RN  Pressure Reduction Techniques: weight shift assistance provided  Head of Bed ([facility]) Positioning: HOB at 30 degrees  Pressure Reduction Devices:   pressure-redistributing mattress utilized   specialty bed utilized  Skin Protection:   incontinence pads utilized   transparent dressing maintained   skin sealant/moisture barrier applied  Taken 10/24/2024 2200 by Nelida Hernandez RN  Head of Bed ([facility]) Positioning: HOB at 30-45 degrees  Taken 10/24/2024 2000 by Nelida Hernandez RN  Activity Management: bedrest  Pressure Reduction Techniques: weight shift assistance provided  Head of Bed ([facility]) Positioning: HOB at 30 degrees  Pressure Reduction Devices:   specialty bed utilized   pressure-redistributing mattress utilized  Skin Protection:   incontinence pads utilized   transparent dressing maintained     Problem: Restraint, Nonviolent  Goal: Absence of Harm or Injury  Intervention: Protect Dignity, Rights and Personal Wellbeing  Recent Flowsheet  Documentation  Taken 10/25/2024 0400 by Nelida Hernandez RN  Trust Relationship/Rapport: care explained  Taken 10/25/2024 0000 by Nelida Hernandez RN  Trust Relationship/Rapport: care explained  Taken 10/24/2024 2000 by Nelida Hernandez RN  Trust Relationship/Rapport: care explained     Problem: Mechanical Ventilation Invasive  Goal: Effective Communication  Outcome: Progressing  Intervention: Ensure Effective Communication  Recent Flowsheet Documentation  Taken 10/25/2024 0600 by Nelida Hernandez RN  Diversional Activities: television  Taken 10/25/2024 0400 by Nelida Hernandez RN  Trust Relationship/Rapport: care explained  Diversional Activities: television  Communication Enhancement Strategies:   extra time allowed for response   nonverbal strategies used   one-step directions provided   repetition utilized  Taken 10/25/2024 0200 by Nelida Hernandez RN  Diversional Activities: television  Taken 10/25/2024 0000 by Nelida Hernandez RN  Trust Relationship/Rapport: care explained  Diversional Activities: television  Communication Enhancement Strategies:   extra time allowed for response    utilized   nonverbal strategies used   one-step directions provided   repetition utilized  Taken 10/24/2024 2200 by Nelida Hernandez RN  Diversional Activities: television  Taken 10/24/2024 2000 by Nelida Hernandez RN  Trust Relationship/Rapport: care explained  Diversional Activities: television  Communication Enhancement Strategies:   nonverbal strategies used   one-step directions provided   repetition utilized  Goal: Optimal Device Function  Outcome: Progressing  Intervention: Optimize Device Care and Function  Recent Flowsheet Documentation  Taken 10/25/2024 0400 by Nelida Hernandez RN  Oral Care:   swabbed with antiseptic solution   suction provided  Airway Safety Measures:   manual resuscitator/mask at bedside   oxygen flowmeter at bedside   suction at bedside  Taken 10/25/2024 0200 by Nelida Hernandez RN  Airway  Safety Measures:   manual resuscitator/mask at bedside   suction at bedside   oxygen flowmeter at bedside  Taken 10/25/2024 0000 by Nelida Hernandez RN  Oral Care:   swabbed with antiseptic solution   suction provided  Airway Safety Measures:   manual resuscitator/mask at bedside   oxygen flowmeter at bedside   suction at bedside  Taken 10/24/2024 2200 by Nelida Hernandez RN  Airway Safety Measures:   manual resuscitator/mask at bedside   oxygen flowmeter at bedside   suction at bedside  Taken 10/24/2024 2000 by Nelida Hernandez RN  Oral Care:   swabbed with antiseptic solution   suction provided  Airway Safety Measures:   manual resuscitator/mask at bedside   oxygen flowmeter at bedside   suction at bedside  Goal: Mechanical Ventilation Liberation  Outcome: Progressing  Intervention: Promote Extubation and Mechanical Ventilation Liberation  Recent Flowsheet Documentation  Taken 10/25/2024 0400 by Nelida Hernandez RN  Environmental Support:   calm environment promoted   environmental consistency promoted  Medication Review/Management: medications reviewed  Taken 10/25/2024 0200 by Nelida Hernandez RN  Medication Review/Management: medications reviewed  Taken 10/25/2024 0000 by Nelida Hernandez RN  Medication Review/Management: medications reviewed  Taken 10/24/2024 2200 by Nelida Hernandez RN  Medication Review/Management: medications reviewed  Taken 10/24/2024 2000 by Nelida Hernandez RN  Environmental Support:   calm environment promoted   environmental consistency promoted  Sleep/Rest Enhancement:   noise level reduced   relaxation techniques promoted   room darkened  Medication Review/Management: medications reviewed  Goal: Optimal Nutrition Delivery  Outcome: Progressing  Intervention: Optimize Nutrition Delivery  Recent Flowsheet Documentation  Taken 10/25/2024 0000 by Nelida Hernandez RN  Nutrition Support Management: tube feeding rate increased  Goal: Absence of Device-Related Skin and Tissue  Injury  Outcome: Progressing  Intervention: Maintain Skin and Tissue Health  Recent Flowsheet Documentation  Taken 10/25/2024 0400 by Nelida Hernandez RN  Device Skin Pressure Protection: absorbent pad utilized/changed  Taken 10/25/2024 0000 by Nelida Hernandez RN  Device Skin Pressure Protection:   absorbent pad utilized/changed   adhesive use limited  Taken 10/24/2024 2000 by Nelida Hernandez RN  Device Skin Pressure Protection:   absorbent pad utilized/changed   adhesive use limited   tubing/devices free from skin contact  Goal: Absence of Ventilator-Induced Lung Injury  Outcome: Progressing  Intervention: Prevent Ventilator-Associated Pneumonia  Recent Flowsheet Documentation  Taken 10/25/2024 0400 by Nelida Hernandez RN  Head of Bed ([facility]) Positioning: HOB at 30 degrees  Oral Care:   swabbed with antiseptic solution   suction provided  Taken 10/25/2024 0200 by Nelida Hernandez RN  Head of Bed ([facility]) Positioning: HOB at 30 degrees  Taken 10/25/2024 0000 by Nelida Hernandez RN  Head of Bed ([facility]) Positioning: HOB at 30 degrees  VAP Prevention Measures: completed  Oral Care:   swabbed with antiseptic solution   suction provided  Taken 10/24/2024 2200 by Nelida Hernandez RN  Head of Bed ([facility]) Positioning: HOB at 30-45 degrees  Taken 10/24/2024 2000 by Nelida Hernandez RN  Head of Bed ([facility]) Positioning: HOB at 30 degrees  Oral Care:   swabbed with antiseptic solution   suction provided   Goal Outcome Evaluation:           Progress: no change

## 2024-10-25 NOTE — PROGRESS NOTES
"DOS: 10/25/2024  NAME: Albina Sosa   : 1965  PCP: Germaine Jaems APRN  Chief Complaint   Patient presents with    Shortness of Breath    Chest Pain       Chief complaint: Left MCA acute ischemic stroke, recent STEMI status post stenting.  Subjective: Patient seen and examined at the bedside this morning, no acute overnight event, patient intubated off sedation, blood pressure under control with some episodes of hypotension overnight, blood sugars this morning at 221.    Objective:  Vital signs: /73   Pulse 114   Temp 98.3 °F (36.8 °C) (Oral)   Resp 16   Ht 154.9 cm (61\")   Wt 51.1 kg (112 lb 10.5 oz)   SpO2 97%   BMI 21.29 kg/m²    Gen: Restrained to the bed, eyes closed, vitals reviewed  MS: Eyes closed opens to sternal rub, did not follow commands, globally aphasic  CN: Blinks to threat on the left, does not blink on the right, VOR present, gaze midline, pupils 2 mm reactive to the light bilaterally, unable to assess dysarthria, right facial droop to supraorbital pain.    Motor: Spontaneously moving the left side, right upper extremity plegic, slight withdrawal on the right lower extremity, normal tone  Sensory: Unable to assess due to patient condition    Laboratory results:  Lab Results   Component Value Date    GLUCOSE 166 (H) 10/25/2024    CALCIUM 8.4 (L) 10/25/2024     10/25/2024    K 4.0 10/25/2024    CO2 23.7 10/25/2024     (H) 10/25/2024    BUN 34 (H) 10/25/2024    CREATININE 1.64 (H) 10/25/2024    EGFRIFAFRI 69 2022    EGFRIFNONA 60 2022    BCR 20.7 10/25/2024    ANIONGAP 9.3 10/25/2024     Lab Results   Component Value Date    WBC 10.98 (H) 10/25/2024    HGB 11.1 (L) 10/25/2024    HCT 34.0 10/25/2024    MCV 89.2 10/25/2024     10/25/2024     Lab Results   Component Value Date     (H) 10/23/2024    LDL  2023      Comment:        LDL cholesterol not calculated. Triglyceride levels  greater than 400 mg/dL invalidate calculated LDL " results.    Reference range: <100    Desirable range <100 mg/dL for primary prevention;    <70 mg/dL for patients with CHD or diabetic patients   with > or = 2 CHD risk factors.    LDL-C is now calculated using the Narciso-Ortiz   calculation, which is a validated novel method providing   better accuracy than the Friedewald equation in the   estimation of LDL-C.   Narciso SS et al. HIMA. 2013;310(19): 8218-8073   (http://education.[employer].com/faq/KJH979)    LDL  07/18/2023      Comment:        LDL cholesterol not calculated. Triglyceride levels  greater than 400 mg/dL invalidate calculated LDL results.    Reference range: <100    Desirable range <100 mg/dL for primary prevention;    <70 mg/dL for patients with CHD or diabetic patients   with > or = 2 CHD risk factors.    LDL-C is now calculated using the Narciso-Ortiz   calculation, which is a validated novel method providing   better accuracy than the Friedewald equation in the   estimation of LDL-C.   Narciso SS et al. HIMA. 2013;310(19): 7358-8216   (http://education.[employer]/faq/DPO016)         Lab 10/23/24  0236   HEMOGLOBIN A1C 14.70*        eview of labs: Blood sugar this morning 221     Review and interpretation of imaging: No new brain imaging to review.    Workup:  -MRI brain:  1. Moderate to large acute left middle cerebral artery distribution  infarction with involvement of the left posterior left frontal lobe,  left parietal lobe, left occipital lobe. Suspect small foci of petechial  hemorrhage associated with the infarction without evidence for large  hemorrhage or shift of the midline structures.  2. Small chronic lacunar infarctions within the right corona radiata,  right gloria, posterior right caudate head.    CT head 24-hour post TNK which showed left MCA hypodensity with no acute hemorrhagic transformation.      Repeat CT head morning 10/24: Personally reviewed stable compared to prior exam from yesterday, no obvious  hemorrhagic transformation.    CTA H/N: Could not complete initially due to patient agitation.    2D echo: EF 14%, borderline left atrial dilatation, no mention about LVT.     Diagnoses:  -Left MCA acute ischemic stroke cardioembolic status post TNK and mechanical thrombectomy  -Left M2 occlusion status post mechanical thrombectomy [id number]  -Significant hyperglycemia on presentation  -Uncontrolled type 2 diabetes  -Meth abuse  -Recent myocardial infarction status post stenting  -Cardiomyopathy  -Heart failure with reduced ejection fraction  -Tobacco abuse        Comment: Overall her exam is stable compared to yesterday.     Plan:  -Will order carotid ultrasound  -Continue dual antiplatelet therapy giving her recent stenting.  -Goal LDL less than 70 currently at 150, Lipitor 80 mg daily  -Goal blood sugar less than 140-180 range, avoid hyperglycemia above 180 or hypoglycemia less than 100.  -Goal blood pressure less than 140 systolic, recommend strict blood pressure control to prevent hemorrhagic transformation.  -Lovenox for DVT prophylaxis  -PT/OT when appropriate  -Extubate when appropriate from ICU standpoint.  -Goal A1c less than 7 currently at 14.7, consult diabetic educator when appropriate  -Patient is critically ill with high risk of complications  - regarding tobacco abuse and meth abuse when appropriate.     Management discussed with nursing staff, Dr. Avitia.

## 2024-10-25 NOTE — PLAN OF CARE
Goal Outcome Evaluation:  Plan of Care Reviewed With: patient        Progress: no change    Pt extubated at 0938 on 3-4L O2 NC.  Pt continues to not follow commands. NIH 23 (aphasic, RUE flaccid, withdraws RLE & spontaneously movement on left side). Left wrist restraint in place prevent pulling out NC and Cortrak. Pt at times very restless. Around 1345, HR noted to be elevated and remain tachycardic to 140 bpm. Pt was found to have pulled off brief, gauze/tegaderm on left groin site, and coccyx mepolex. Pt was repositioned and STAT EKG obtained. HR decreased to below 120 bpm. Dr. Avitia notified of concern of pt withdrawing and no beta blocker on MAR to give (Cardiology did hold off due to labile BP and pt on sedation in past). Cardiology called and updated of events and order was received to initiate Metoprolol tonight. Dr. Avitia to start pt on Effexor tonight. One time IV Lasix given with large output. Neurology ok with pt transferring out of ICU, but pt needing tighter BG control (less than 180). Insulin orders adjusted per Dr. Avitia and informed RN to notify pulmonary if BG above 180 as pt may need insulin gtt. 1600 BG check 194 & RN notified Dr. Avitia who did not want to start insulin gtt at that time. Nursing communication order in for nightshift to call answering service if BG above 180. Pt's boyfriend visited today. Ongoing care provided.

## 2024-10-26 PROBLEM — R74.8 ELEVATED LIVER ENZYMES: Status: ACTIVE | Noted: 2024-10-26

## 2024-10-26 PROBLEM — J44.9 COPD (CHRONIC OBSTRUCTIVE PULMONARY DISEASE): Status: ACTIVE | Noted: 2024-10-26

## 2024-10-26 PROBLEM — E11.65 TYPE 2 DIABETES MELLITUS WITH HYPERGLYCEMIA: Status: ACTIVE | Noted: 2024-10-26

## 2024-10-26 PROBLEM — J96.01 ACUTE HYPOXIC RESPIRATORY FAILURE: Status: ACTIVE | Noted: 2024-10-26

## 2024-10-26 PROBLEM — I25.5 ISCHEMIC CARDIOMYOPATHY: Status: ACTIVE | Noted: 2024-10-26

## 2024-10-26 PROBLEM — I73.9 PERIPHERAL VASCULAR DISEASE: Status: ACTIVE | Noted: 2024-10-26

## 2024-10-26 PROBLEM — E78.5 HYPERLIPEMIA: Status: ACTIVE | Noted: 2024-10-26

## 2024-10-26 PROBLEM — F19.10 POLYSUBSTANCE ABUSE: Status: ACTIVE | Noted: 2024-10-26

## 2024-10-26 NOTE — CONSULTS
Patient Name:  Albina Sosa  YOB: 1965  MRN:  [id number]  Date of Admission:  10/22/2024  Date of Consult:  10/26/2024  Patient Care Team:  Germaine James APRN as PCP - General (Nurse Practitioner)    Inpatient Internal Medicine Consult  Consult performed by: Ramona Sams APRN  Consult ordered by: Alek Bauer MD        Subjective   History of Present Illness  Ms. Sosa is a 59 y.o. female that has been admitted to [facility] 10/22/24 due to cardiology service line for ST segment elevation and by involving the left circumflex coronary artery.  She has a history of prior coronary artery disease status post LIMA to the LAD PCI to her circumflex and left PDA, hypertension, mixed sleep hyperlipidemia, type 2 diabetes on insulin therapy, history of CVA, history of methamphetamine use, and ongoing tobacco use with underlying lung disease.  EMS was called after the patient developed chest discomfort and EKG changes noted a lateral ST segment and MI.  She underwent emergent coronary catheterization and was found to have multivessel disease with severe cardiomyopathy and a culprit distal marginal branch and underwent PCI, stent to the marginal branch. She has diffuse LAD and PDA disease with collaterals.  Ejection fraction was noted to be 10% on the LV gram per cath obvious thrombus.  Postcatheterization she was noted to have some increased agitation and right sided weakness and aphasia and stat neurology consult was placed and team D was activate.  CT of the head showed no acute hemorrhage or hypodensity CTA of the head and neck was not performed at that time due to agitation.  Patient was given TNK and sent for angiogram out of concern for left MCA disease.  She underwent mechanical embolectomy by neurosurgery for acute stroke for inferior distal M2/M3 left MCA occlusion.  She was noted to have significant hyper per glycemia on admission with hemoglobin A1c noted to be  14.7.  The echo was obtained and showed an EF of 14% with no obvious LV thrombus noted carotid ultrasound showed nonsignificant stenosis of the internal carotid arteries bilaterally.   She has left with right-sided paraparesis as well as aphasia.    Her pulmonary status has been somewhat tenuous.  She has responded well to low-dose oxygen and IV diuresis.  Extubated on 10/25.  We have been asked to evaluate and make recommendations regarding treatment for her diabetes        Evaluate status and make recommendations regarding treatment for diabetes and hypertension.    History reviewed. No pertinent past medical history.  Past Surgical History:   Procedure Laterality Date    CARDIAC CATHETERIZATION N/A 10/22/2024    Procedure: Left Heart Cath;  Surgeon: Bret Lawrence MD;  Location: [facility] CATH INVASIVE LOCATION;  Service: Cardiovascular;  Laterality: N/A;    CARDIAC CATHETERIZATION N/A 10/22/2024    Procedure: Stent LAM coronary;  Surgeon: Bret Lawrence MD;  Location: [facility] CATH INVASIVE LOCATION;  Service: Cardiovascular;  Laterality: N/A;    CARDIAC CATHETERIZATION N/A 10/22/2024    Procedure: Percutaneous Coronary Intervention;  Surgeon: Bret Lawrence MD;  Location: [facility] CATH INVASIVE LOCATION;  Service: Cardiovascular;  Laterality: N/A;    CARDIAC CATHETERIZATION N/A 10/22/2024    Procedure: Coronary angiography;  Surgeon: Bret Lawrence MD;  Location: [facility] CATH INVASIVE LOCATION;  Service: Cardiovascular;  Laterality: N/A;    EMBOLECTOMY N/A 10/22/2024    Procedure: EMBOLECTOMY MECHANICAL;  Surgeon: Jeremiah Carey MD;  Location: [facility] OR;  Service: Interventional Radiology;  Laterality: N/A;     History reviewed. No pertinent family history.      Current Facility-Administered Medications:     acetaminophen (TYLENOL) tablet 650 mg, 650 mg, Nasogastric, Q4H PRN, Alek Bauer MD, 650 mg at 10/24/24 2057    amoxicillin-clavulanate (AUGMENTIN) 500-125 MG per tablet 500 mg, 500  mg, Oral, Q12H, Alek Bauer MD, 500 mg at 10/26/24 1054    aspirin chewable tablet 81 mg, 81 mg, Nasogastric, Daily, Alek Bauer MD, 81 mg at 10/26/24 0847    atorvastatin (LIPITOR) tablet 40 mg, 40 mg, Oral, Nightly, Alek Bauer MD, 40 mg at 10/25/24 2054    Calcium Replacement - Follow Nurse / BPA Driven Protocol, , Does not apply, PRN, Alek Bauer MD    chlorhexidine (PERIDEX) 0.12 % solution 15 mL, 15 mL, Mouth/Throat, Q12H, Alek Bauer MD, 15 mL at 10/26/24 0859    clopidogrel (PLAVIX) tablet 75 mg, 75 mg, Nasogastric, Daily, Alek Bauer MD, 75 mg at 10/26/24 0847    dextrose (D50W) (25 g/50 mL) IV injection 25 g, 25 g, Intravenous, Q15 Min PRN, Alek Bauer MD    dextrose (GLUTOSE) oral gel 15 g, 15 g, Oral, Q15 Min PRN, Alek Bauer MD    Enoxaparin Sodium (LOVENOX) syringe 30 mg, 30 mg, Subcutaneous, Nightly, Alek Bauer MD, 30 mg at 10/25/24 2041    famotidine (PEPCID) tablet 20 mg, 20 mg, Nasogastric, Daily, Alek Bauer MD, 20 mg at 10/26/24 0849    glucagon (GLUCAGEN) injection 1 mg, 1 mg, Intramuscular, Q15 Min PRN, Alek Bauer MD    hydrALAZINE (APRESOLINE) injection 10 mg, 10 mg, Intravenous, Q4H PRN, Alek Bauer MD    HYDROcodone-acetaminophen (NORCO) 5-325 MG per tablet 1 tablet, 1 tablet, Nasogastric, Q4H PRN, Alek Bauer MD, 1 tablet at 10/25/24 1435    insulin glargine (LANTUS, SEMGLEE) injection 30 Units, 30 Units, Subcutaneous, Q12H, Alek Bauer MD    insulin regular (humuLIN R,novoLIN R) injection 4-24 Units, 4-24 Units, Subcutaneous, Q4H, Alek Bauer MD, 4 Units at 10/26/24 1315    ipratropium-albuterol (DUO-NEB) nebulizer solution 3 mL, 3 mL, Nebulization, 4x Daily - RT, Alek Bauer MD, 3 mL at 10/26/24 1134    ipratropium-albuterol (DUO-NEB) nebulizer solution 3 mL, 3 mL, Nebulization, Q1H PRN, Alek Bauer MD    Magnesium Standard Dose Replacement - Follow Nurse / BPA Driven Protocol, , Does not apply, PRN, Juancarlos, Lebnan S,  MD    metoprolol tartrate (LOPRESSOR) tablet 12.5 mg, 12.5 mg, Oral, Q12H, Alek Bauer MD, 12.5 mg at 10/26/24 0849    nitroglycerin (NITROSTAT) SL tablet 0.4 mg, 0.4 mg, Sublingual, Q5 Min PRN, Alek Bauer MD    OLANZapine (zyPREXA) injection 10 mg, 10 mg, Intramuscular, Once PRN, Alek Bauer MD    ondansetron ODT (ZOFRAN-ODT) disintegrating tablet 4 mg, 4 mg, Oral, Q6H PRN **OR** ondansetron (ZOFRAN) injection 4 mg, 4 mg, Intravenous, Q6H PRN, Alek Bauer MD    Phosphorus Replacement - Follow Nurse / BPA Driven Protocol, , Does not apply, Juancarlos TAYLOR Lebnan S, MD    Potassium Replacement - Follow Nurse / BPA Driven Protocol, , Does not apply, Juancarlos TAYLOR Lebnan S, MD    Potassium Replacement - Follow Nurse / BPA Driven Protocol, , Does not apply, Juancarlos TAYLOR Lebnan S, MD    [COMPLETED] Insert Peripheral IV, , , Once **AND** sodium chloride 0.9 % flush 10 mL, 10 mL, Intravenous, PRN, Alek Bauer MD    venlafaxine (EFFEXOR) tablet 37.5 mg, 37.5 mg, Nasogastric, BID, Alek Bauer MD, 37.5 mg at 10/26/24 0852       No medications prior to admission.     Allergies:  Bactrim [sulfamethoxazole-trimethoprim], Cefaclor, Flexeril [cyclobenzaprine], and Robaxin [methocarbamol]    Review of Systems  Unobtainable due to aphasia and somnolence    Objective      Vital Signs  Temp:  [97.5 °F (36.4 °C)-99.2 °F (37.3 °C)] 99.2 °F (37.3 °C)  Heart Rate:  [] 110  Resp:  [19-22] 20  BP: ()/() 127/92  Body mass index is 20.45 kg/m².    Physical Exam  Vitals and nursing note reviewed.   Constitutional:       Appearance: She is ill-appearing (Medically ill-appearing).      Comments: Somnolent, does withdraw to pain   Eyes:      General: No scleral icterus.     Conjunctiva/sclera: Conjunctivae normal.   Cardiovascular:      Rate and Rhythm: Normal rate and regular rhythm.      Pulses: Normal pulses.      Heart sounds: Normal heart sounds.      Comments: Doppler pulse right DP/PT, feet warm  bilaterally  Pulmonary:      Effort: Pulmonary effort is normal.      Breath sounds: Normal breath sounds.   Abdominal:      General: Bowel sounds are normal. There is no distension.      Palpations: Abdomen is soft.      Tenderness: There is no abdominal tenderness.   Musculoskeletal:      Right lower leg: No edema.      Left lower leg: No edema.   Skin:     General: Skin is warm and dry.      Comments: Venous stasis changes noted to lower extremities   Neurological:      Comments: Right-sided neglect, purposeful movements to left           Results Review:   I reviewed the patient's new clinical results.  I reviewed the patient's new imaging results and agree with the interpretation.  I reviewed the patient's other test results and agree with the interpretation         Assessment/Plan     Active Hospital Problems    Diagnosis  POA    **STEMI involving left circumflex coronary artery [I21.21]  Yes    Hyperlipemia [E78.5]  Unknown    Acute hypoxic respiratory failure [J96.01]  Unknown    COPD (chronic obstructive pulmonary disease) [J44.9]  Unknown    Type 2 diabetes mellitus with hyperglycemia [E11.65]  Unknown    Ischemic cardiomyopathy [I25.5]  Unknown    Elevated liver enzymes [R74.8]  Unknown    Polysubstance abuse [F19.10]  Unknown    Peripheral vascular disease [I73.9]  Unknown    Acute ischemic left middle cerebral artery (MCA) stroke [I63.512]  No      Resolved Hospital Problems   No resolved problems to display.     Acute left MCA ischemic stroke, status post TNK and thrombectomy  Acute STEMI status post primary PCI  Acute respiratory failure requiring invasive mechanical ventilation  CAD with history CABG and prior stents  Ischemic cardiomyopathy with EF 10%  Uncontrolled hyperglycemia with blood sugar very high  Type 2 diabetes mellitus  COPD  Current cigarette smoker  Obesity  Hyponatremia  Elevated liver enzymes  NICHO  Fever  Positive UDS  Peripheral vascular disease    Ms. Ssoa is a 59 y.o. female who  is s/p PCI to marginal branch and cranial EMBOLECTOMY MECHANICAL for left MCA stroke    Acute left MCA ischemic stroke  - status post TNK and thrombectomy  -Neurology following.  Repeat head imaging negative for hemorrhagic conversion per nephrology  -Remains on dual antiplatelet therapy and statin therapy  -Partial goal systolic 140 or less strict blood pressure control to prevent hemorrhagic transformation  -Neurology has okayed use of Lovenox for DVT prophylaxis  -Continue therapies PT and OT  -LDL goal less than 70    Acute STEMI status post primary PCI/stent/ischemic cardiomyopathy  -Cardiac cath with multivessel disease with PCI/stent to mid marginal branch  -EF 14% by echo without clear evidence of left ventricular thrombus  -Cardiology following  -Currently on aspirin and Plavix  -History of prior CAB    Hyperlipidemia poorly controlled / hypertriglyceridemia  -Currently on atorvastatin  -Noted total cholesterol back in 2023 403 with an LDL incalculable with triglycerides over thousand.  -Admission lipid panel does show total cholesterol of 245, HDL 37 , triglycerides 311.  LDL HDL ratio is 3.94  -LDL less than 70  -Certainly her poorly controlled diabetes is contributing to her elevated triglycerides and subsequent elevated LDL  -TSH     Acute hypoxic respiratory failure/COPD  -Initially requiring ventilator support . Liberated from ventilator 10/25 3 L nasal cannula  -Pulmonary is following  -Continue nebulizers    Type 2 diabetes, uncontrolled hyperglycemia   -A1c 14.7  -Sugars over 600 on admission  -Clear how she was managing her diabetes as an outpatient  -Currently on 30 units Lantus every 12  -She is on every 6 hour Accu-Cheks while on tube feeding with sliding scale insulin coverage.    History of tobacco use disorder   -Recommend full cessation of tobacco    Hyponatremia  -Initially hyponatremic on admission.  This has resolved now drifting a bit hypernatremic  -He is on tube feeds with 992  mL free water +180 mL water flushes  -Trend sodium    Elevated liver enzymes  -Likely from shock and low cardiac output.  Overall trend improving.  ALT has normalized AST continues to trend down    NICHO on CKD  -Creatinine improving.  Creatinine 1.38 today down from 1.64.  Did get some contrast load initially on admission likely contributed to rise in creatinine on 10/25  -He has component of chronic kidney disease from poorly controlled diabetes  -Avoid nephrotoxic agents    Fever  -Developed fever on 1020/2400.8 Tmax.  Low-grade temp Tmax 99.2   -She was on Zosyn with plans by pulmonary to transition to oral antibiotic and then discontinue  -Micro reviewed all cultures negative to date.  Noted positive procalcitonin and leukocytosis which did improve on antibiotic therapy    Positive UDS  -Positive for benzos and amphetamines on admission  -Continue to monitor for withdrawal  -Currently she is nonparticipatory in her care so access referral likely not helpful at this point    Peripheral vascular disease  -Doppler pulses on the right.  Vascular was consulted early on in admission  -Vascular felt she likely had peripheral vascular disease and recommended continue supportive care but did not feel surgery was needed and planned to monitor closely.  -Feet remain warm, Doppler pulses on right.    Urinary retention  -Man catheter removed yesterday.  Patient is requiring In-N-Out cath for high residuals residuals  -Continue to monitor may have to replace Man and consult urology if need for In-N-Out cath remains consistent    Thank you very much for asking [facility] to be involved in this patient's care. We will follow along with you.      BULMARO Rider  [facility]  10/26/24  14:58 EDT

## 2024-10-26 NOTE — PROGRESS NOTES
Pulmonary/Critical care  Was called by nursing patient increasing O2 needs chest x-ray done looks like worsening pulmonary edema and bilateral pleural effusions she was pretty clear this morning.  She has a very severe cardiomyopathy some renal insufficiency I am going to give her dose of IV Lasix and see if we can affect some improvement.  I am a little hesitant to put her on noninvasive ventilation with her aphasia I am afraid increased risk of aspiration pneumonia.,  She is not on that much O2 yet were still holding her sats reasonably well.  Will watch her closely hopefully diuresis will help

## 2024-10-26 NOTE — THERAPY EVALUATION
Patient Name: Albina Sosa  : 1965    MRN: [id number]                              Today's Date: 10/26/2024       Admit Date: 10/22/2024    Visit Dx:     ICD-10-CM ICD-9-CM   1. Acute ST elevation myocardial infarction (STEMI), unspecified artery  I21.3 410.90   2. Smoker  F17.200 305.1   3. H/O medication noncompliance  Z91.148 V15.81   4. Acute pulmonary edema  J81.0 518.4     Patient Active Problem List   Diagnosis    STEMI involving left circumflex coronary artery    Acute ischemic left middle cerebral artery (MCA) stroke    Hyperlipemia    Acute hypoxic respiratory failure    COPD (chronic obstructive pulmonary disease)    Type 2 diabetes mellitus with hyperglycemia    Ischemic cardiomyopathy    Elevated liver enzymes    Polysubstance abuse    Peripheral vascular disease     History reviewed. No pertinent past medical history.  Past Surgical History:   Procedure Laterality Date    CARDIAC CATHETERIZATION N/A 10/22/2024    Procedure: Left Heart Cath;  Surgeon: Bret Lawrence MD;  Location: [facility] CATH INVASIVE LOCATION;  Service: Cardiovascular;  Laterality: N/A;    CARDIAC CATHETERIZATION N/A 10/22/2024    Procedure: Stent LAM coronary;  Surgeon: Bret Lawrence MD;  Location: [facility] CATH INVASIVE LOCATION;  Service: Cardiovascular;  Laterality: N/A;    CARDIAC CATHETERIZATION N/A 10/22/2024    Procedure: Percutaneous Coronary Intervention;  Surgeon: Bret Lawrence MD;  Location: [facility] CATH INVASIVE LOCATION;  Service: Cardiovascular;  Laterality: N/A;    CARDIAC CATHETERIZATION N/A 10/22/2024    Procedure: Coronary angiography;  Surgeon: Bret Lawrence MD;  Location: [facility] CATH INVASIVE LOCATION;  Service: Cardiovascular;  Laterality: N/A;    EMBOLECTOMY N/A 10/22/2024    Procedure: EMBOLECTOMY MECHANICAL;  Surgeon: Jeremiah Carey MD;  Location: [facility] HYBRID OR;  Service: Interventional Radiology;  Laterality: N/A;      General Information       Row Name 10/26/24 1504           Physical Therapy Time and Intention    Document Type evaluation  -DB     Mode of Treatment individual therapy;physical therapy  -DB       Row Name 10/26/24 1504          General Information    Patient Profile Reviewed yes  -DB     Existing Precautions/Restrictions fall  -DB     Barriers to Rehab medically complex;cognitive status  -DB       Row Name 10/26/24 1504          Cognition    Orientation Status (Cognition) unable/difficult to assess  -DB       Row Name 10/26/24 1504          Safety Issues/Impairments Affecting Functional Mobility    Safety Issues Affecting Function (Mobility) ability to follow commands;at risk behavior observed;awareness of need for assistance;impulsivity  -DB     Impairments Affecting Function (Mobility) balance;cognition;endurance/activity tolerance;coordination;grasp;motor control;motor planning;muscle tone abnormal;postural/trunk control;strength  -DB     Cognitive Impairments, Mobility Safety/Performance attention;awareness, need for assistance;insight into deficits/self-awareness;impulsivity;problem-solving/reasoning;safety precaution awareness  -DB               User Key  (r) = Recorded By, (t) = Taken By, (c) = Cosigned By      Initials Name Provider Type    DB Amanda Diaz PT Physical Therapist                   Mobility       Row Name 10/26/24 150          Bed Mobility    Bed Mobility supine-sit;sit-supine  -DB     Supine-Sit [place] (Bed Mobility) moderate assist (50% patient effort)  -DB     Sit-Supine [place] (Bed Mobility) moderate assist (50% patient effort)  -DB     Assistive Device (Bed Mobility) bed rails;head of bed elevated  -DB       Row Name 10/26/24 1508          Sit-Stand Transfer    Sit-Stand [place] (Transfers) unable to assess  -DB               User Key  (r) = Recorded By, (t) = Taken By, (c) = Cosigned By      Initials Name Provider Type    Amanda Pacheco PT Physical Therapist                   Obj/Interventions       Row Name 10/26/24  1508          Strength Comprehensive (MMT)    Comment, General Manual Muscle Testing (MMT) Assessment R stacy, 0/5  -DB       Row Name 10/26/24 1508          Balance    Balance Assessment sitting static balance;sitting dynamic balance  -DB     Static Sitting Balance maximum assist  -DB     Dynamic Sitting Balance maximum assist  -DB     Position, Sitting Balance sitting edge of bed;supported  -DB     Balance Interventions sitting  -DB               User Key  (r) = Recorded By, (t) = Taken By, (c) = Cosigned By      Initials Name Provider Type    DB Amanda Diaz, PT Physical Therapist                   Goals/Plan       Row Name 10/26/24 1527          Bed Mobility Goal 1 (PT)    Activity/Assistive Device (Bed Mobility Goal 1, PT) bed mobility activities, all  -DB     Drew Level/Cues Needed (Bed Mobility Goal 1, PT) minimum assist (75% or more patient effort)  -DB     Time Frame (Bed Mobility Goal 1, PT) 1 week  -DB       Row Name 10/26/24 1527          Transfer Goal 1 (PT)    Activity/Assistive Device (Transfer Goal 1, PT) transfers, all  -DB     Drew Level/Cues Needed (Transfer Goal 1, PT) minimum assist (75% or more patient effort)  -DB     Time Frame (Transfer Goal 1, PT) 1 week  -DB       Row Name 10/26/24 1527          Gait Training Goal 1 (PT)    Activity/Assistive Device (Gait Training Goal 1, PT) gait (walking locomotion)  -DB     Drew Level (Gait Training Goal 1, PT) minimum assist (75% or more patient effort)  -DB     Time Frame (Gait Training Goal 1, PT) 1 week  -DB       Row Name 10/26/24 1527          Therapy Assessment/Plan (PT)    Planned Therapy Interventions (PT) balance training;bed mobility training;gait training;home exercise program;neuromuscular re-education;patient/family education;strengthening;ROM (range of motion);stair training;postural re-education;transfer training  -DB               User Key  (r) = Recorded By, (t) = Taken By, (c) = Cosigned By      Initials  Name Provider Type    Amanda Pacheco, MICHELLE Physical Therapist                   Clinical Impression       Row Name 10/26/24 1504          Plan of Care Review    Plan of Care Reviewed With patient  -DB     Outcome Evaluation Patient is a 59 y.o. female admitted to [facility] on 10/22/2024 for STEMI, s/p stenting, L MCA CVA. Unable to obtain patient information d/t aphasia. Pt supine in bed at start of session, soft wrist restrain on LUE, Flaccid RUE. Today, patient performed bed mobility with min/modA. Pt was not following directions, unsafe to attempt transfer or standing this session. Strength, balance, and activity tolerance deficits noted. Patient may benefit from skilled PT services acutely to address functional deficits as well as improve level of independence prior to discharge. Anticipate IRF upon DC.  -DB       Row Name 10/26/24 1500          Therapy Assessment/Plan (PT)    Rehab Potential (PT) fair  -DB     Criteria for Skilled Interventions Met (PT) yes  -DB     Therapy Frequency (PT) 6 times/wk  -DB       Row Name 10/26/24 1509          Vital Signs    O2 Delivery Pre Treatment room air  -DB     O2 Delivery Intra Treatment room air  -DB     O2 Delivery Post Treatment room air  -DB     Pre Patient Position Supine  -DB     Intra Patient Position Sitting  -DB     Post Patient Position Supine  -DB       Row Name 10/26/24 1508          Positioning and Restraints    Pre-Treatment Position in bed  -DB     Post Treatment Position bed  -DB     In Bed supine;call light within reach;encouraged to call for assist;exit alarm on  -DB     Restraints reapplied:;soft limb  -DB               User Key  (r) = Recorded By, (t) = Taken By, (c) = Cosigned By      Initials Name Provider Type    Amanda Pacheco PT Physical Therapist                   Outcome Measures       Row Name 10/26/24 0124          How much help from another person do you currently need...    Turning from your back to your side while in flat bed without  using bedrails? 1  -DB     Moving from lying on back to sitting on the side of a flat bed without bedrails? 1  -DB     Moving to and from a bed to a chair (including a wheelchair)? 1  -DB     Standing up from a chair using your arms (e.g., wheelchair, bedside chair)? 1  -DB     Climbing 3-5 steps with a railing? 1  -DB     To walk in hospital room? 1  -DB     AM-PAC 6 Clicks Score (PT) 6  -DB     Highest Level of Mobility Goal 2 --> Bed activities/dependent transfer  -       Row Name 10/26/24 1435          Modified [place] Scale    Pre-Stroke Modified [place] Scale 0 - No Symptoms at all.  -BC     Modified [place] Scale 5 - Severe disability.  Bedridden, incontinent, and requiring constant nursing care and attention.  -BC       Row Name 10/26/24 1528 10/26/24 1435       Functional Assessment    Outcome Measure Options AM-PAC 6 Clicks Basic Mobility (PT)  -DB AM-PAC 6 Clicks Daily Activity (OT);Modified [place]  -BC              User Key  (r) = Recorded By, (t) = Taken By, (c) = Cosigned By      Initials Name Provider Type    DB Amanda Diaz, PT Physical Therapist    BC Josselin Shaikh, OT Occupational Therapist                                 Physical Therapy Education       Title: PT OT SLP Therapies (In Progress)       Topic: Physical Therapy (In Progress)       Point: Mobility training (In Progress)       Learning Progress Summary            Patient Nonacceptance, E, NR,NL by DB at 10/26/2024 1528                      Point: Home exercise program (In Progress)       Learning Progress Summary            Patient Nonacceptance, E, NR,NL by DB at 10/26/2024 1528                      Point: Body mechanics (In Progress)       Learning Progress Summary            Patient Nonacceptance, E, NR,NL by DB at 10/26/2024 1528                      Point: Precautions (In Progress)       Learning Progress Summary            Patient Nonacceptance, E, NR,NL by DB at 10/26/2024 1528                                      User Key        Initials Effective Dates Name Provider Type Discipline    DB 06/16/21 -  Amanda Diaz, PT Physical Therapist PT                  PT Recommendation and Plan  Planned Therapy Interventions (PT): balance training, bed mobility training, gait training, home exercise program, neuromuscular re-education, patient/family education, strengthening, ROM (range of motion), stair training, postural re-education, transfer training  Outcome Evaluation: Patient is a 59 y.o. female admitted to [facility] on 10/22/2024 for STEMI, s/p stenting, L MCA CVA. Unable to obtain patient information d/t aphasia. Pt supine in bed at start of session, soft wrist restrain on LUE, Flaccid RUE. Today, patient performed bed mobility with min/modA. Pt was not following directions, unsafe to attempt transfer or standing this session. Strength, balance, and activity tolerance deficits noted. Patient may benefit from skilled PT services acutely to address functional deficits as well as improve level of independence prior to discharge. Anticipate IRF upon DC.     Time Calculation:         PT Charges       Row Name 10/26/24 1535             Time Calculation    Start Time 1353  -DB      Stop Time 1410  -DB      Time Calculation (min) 17 min  -DB      PT Received On 10/26/24  -DB      PT - Next Appointment 10/28/24  -DB      PT Goal Re-Cert Due Date 11/02/24  -DB         Time Calculation- PT    Total Timed Code Minutes- PT 8 minute(s)  -DB                User Key  (r) = Recorded By, (t) = Taken By, (c) = Cosigned By      Initials Name Provider Type    DB Amanda Diaz, PT Physical Therapist                  Therapy Charges for Today       Code Description Service Date Service Provider Modifiers Qty    [id number]  PT EVAL MOD COMPLEXITY 2 10/26/2024 Amanda Diaz, PT GP 1    [id number]  PT THERAPEUTIC ACT EA 15 MIN 10/26/2024 Amanda Diaz, PT GP 1            PT G-Codes  Outcome Measure Options: AM-PAC 6 Clicks Basic Mobility (PT)  AM-PAC 6  Clicks Score (PT): 6  AM-PAC 6 Clicks Score (OT): 6  Modified [place] Scale: 5 - Severe disability.  Bedridden, incontinent, and requiring constant nursing care and attention.  PT Discharge Summary  Anticipated Discharge Disposition (PT): inpatient rehabilitation facility    Amanda Diaz, PT  10/26/2024

## 2024-10-26 NOTE — NURSING NOTE
Pt arrived to unit from ICU at this time via bed transfer. Pt is non verbal, PERRLA, NIH-25, no s/s of pain or distress noted. NG tube intact, flushes well. HOB elevated, call light and personal items in reach.

## 2024-10-26 NOTE — PROGRESS NOTES
"Patient Care Team:  Germaine James APRN as PCP - General (Nurse Practitioner)    Chief Complaint: Cardiomyopathy, STEMI, embolic CVA    Interval History:   Off vasopressors and currently doing well on 2 L nasal cannula.  Tube feed for dysphagia    Objective   Vital Signs  Temp:  [97.5 °F (36.4 °C)-98.7 °F (37.1 °C)] 98.2 °F (36.8 °C)  Heart Rate:  [] 106  Resp:  [19-24] 20  BP: ()/(68-99) 137/97    Intake/Output Summary (Last 24 hours) at 10/26/2024 1152  Last data filed at 10/26/2024 0530  Gross per 24 hour   Intake 1857.01 ml   Output 3375 ml   Net -1517.99 ml     Flowsheet Rows      Flowsheet Row First Filed Value   Admission Height 154.9 cm (61\") Documented at 10/22/2024 1700   Admission Weight 54 kg (119 lb 0.8 oz) Documented at 10/22/2024 1214            Temp:  [97.5 °F (36.4 °C)-98.7 °F (37.1 °C)] 98.2 °F (36.8 °C)  Heart Rate:  [] 106  Resp:  [19-24] 20  BP: ()/(68-99) 137/97    Intake/Output Summary (Last 24 hours) at 10/26/2024 1152  Last data filed at 10/26/2024 0530  Gross per 24 hour   Intake 1857.01 ml   Output 3375 ml   Net -1517.99 ml     Flowsheet Rows      Flowsheet Row First Filed Value   Admission Height 154.9 cm (61\") Documented at 10/22/2024 1700   Admission Weight 54 kg (119 lb 0.8 oz) Documented at 10/22/2024 1214            General Appearance:    Alert, cooperative, in no acute distress   Head:    Normocephalic, without obvious abnormality, atraumatic       Neck/Lymph   No adenopathy, supple, no thyromegaly, no carotid bruit, no    JVD   Lungs:     Clear to auscultation bilaterally, no wheezes, rales, or     rhonchi    Cardiac:    Normal rate, regular rhythm, no murmur, no rub, no gallop   Chest Wall:    No abnormalities observed   GI:     Normal bowel sounds, soft, nontender, nondistended,            no rebound tenderness   Extremities:   No cyanosis, clubbing, or edema   Circulatory/Peripheral Vascular :   Pulses palpable and equal bilaterally   Integumentary: "   No bleeding or rash. Normal temperature                amoxicillin-clavulanate, 500 mg, Oral, Q12H  aspirin, 81 mg, Nasogastric, Daily  atorvastatin, 40 mg, Oral, Nightly  chlorhexidine, 15 mL, Mouth/Throat, Q12H  clopidogrel, 75 mg, Nasogastric, Daily  enoxaparin, 30 mg, Subcutaneous, Nightly  famotidine, 20 mg, Nasogastric, Daily  insulin glargine, 30 Units, Subcutaneous, Q12H  insulin regular, 4-24 Units, Subcutaneous, Q4H  ipratropium-albuterol, 3 mL, Nebulization, 4x Daily - RT  metoprolol tartrate, 12.5 mg, Oral, Q12H  venlafaxine, 37.5 mg, Nasogastric, BID             Results Review:    Results from last 7 days   Lab Units 10/26/24  1025 10/26/24  0307   SODIUM mmol/L  --  148*   POTASSIUM mmol/L 3.7 3.3*   CHLORIDE mmol/L  --  110*   CO2 mmol/L  --  26.3   BUN mg/dL  --  34*   CREATININE mg/dL  --  1.38*   GLUCOSE mg/dL  --  168*   CALCIUM mg/dL  --  8.8     Results from last 7 days   Lab Units 10/22/24  1646 10/22/24  1150   HSTROP T ng/L >10,000* 3,432*     Results from last 7 days   Lab Units 10/26/24  0307   WBC 10*3/mm3 11.13*   HEMOGLOBIN g/dL 12.4   HEMATOCRIT % 36.6   PLATELETS 10*3/mm3 359     Results from last 7 days   Lab Units 10/23/24  0843 10/22/24  1722 10/22/24  1410 10/22/24  1150   INR   --   --   --  1.02   APTT seconds 23.6 49.8* 133.1* 22.4*     Results from last 7 days   Lab Units 10/23/24  0236   CHOLESTEROL mg/dL 245*         Results from last 7 days   Lab Units 10/23/24  0236   CHOLESTEROL mg/dL 245*   TRIGLYCERIDES mg/dL 311*   HDL CHOL mg/dL 37*   LDL CHOL mg/dL 150*     @LABRCNT(bnp)@  I reviewed the patient's new clinical results.  I personally viewed and interpreted the patient's EKG/Telemetry data            Assessment & Plan   59 y.o. woman with past medical history notable for coronary disease status post LIMA to LAD percutaneous interventions to her circumflex and left-sided PDA, hypertension, mixed hyperlipidemia, diabetes type 2 on insulin therapy, history of stroke,  history of methamphetamine use, and ongoing tobacco abuse with underlying lung disease who presented to the emergency room with a day or 2 of acute onset chest discomfort on 10/22 was noted to have ST changes concerning for STEMI.  She was taken emergently to the Cath Lab where she was found to have multivessel disease some chronic and on her mid marginal branch which fit with her EKG changes was felt to be the acute lesion and behaved so.  This was revascularized and stent placed.  She was also noted to have occlusion of her left dominant circumflex however this behave more chronic given that there was already collateralization and wire would not pass.  She was also found to have severe cardiomyopathy which is new compared to at least stress findings from 2 years ago.      Unfortunately patient had a stroke symptoms at the end of the case she was taken emergently had TNK and subsequently taken for thrombectomy despite that still had a fairly sizable stroke on the left side.       STEMI:  Mid marginal branch felt to be culprit however patient with severe chronic disease throughout the LAD and circumflex and left-sided PDA  Status post PCI 10/22  Would like to continue aspirin and Plavix   Follow-up echocardiogram does not show clear evidence of left ventricular thrombus severely decreased ejection     Stroke:  Likely cardioembolic unclear if from left ventricular thrombus or catheter  Status post TNK and thrombectomy  Neurology following  Now on aspirin and clopidogrel     Acute systolic congestive heart failure:  Severely elevated LVEDP of 40 mmHg in the Cath Lab  Did receive diuretic her chest x-ray is looking better    Diabetes type 2: Hemoglobin A1c 14.7  Elevated blood sugars noted but no evidence of acidosis think it is more of a hyperglycemia

## 2024-10-26 NOTE — PLAN OF CARE
Goal Outcome Evaluation:  Plan of Care Reviewed With:  (pt unable to demo teachback for POC)        Progress: no change  Outcome Evaluation: Pt is a 58 y/o M admitted to [facility] 10/22 with Left MCA stroke, recent STEMI s/p stenting, heart failure with reduced ejection fraction. Hx of uncontrolled type 2 diabetes, meth abuse, cardiomyopathy, and tobacco abuse. Upon entering room Pt with eyes closed, unable to arouse initially to OT. completed assessment and appropriate to continue to sitting from supine in attempt to get any neuro feedback/stimulation, alertness/arousal for future ADL participation and per stroke recovery. Pt with eyes intermittently open but mostly closed but was able to assist some w/ holding herself up EOB unsupported w min to mod A. Pt was also able to tolerate standing /WBing through BLEs for NMR /feebback but otherwise not active mvmts seen in functional response. Will trial OT services with goal to increase ADL participation, for sitting balance and NMR training, increased communication/comprehension skills for active /functional participation in tasks. DC REC pending ability to progress while IP IRF vs ADILIA recs. Will trial high frequency 5x/week and if Pts function does not improve then recommending decrease POC.    Anticipated Discharge Disposition (OT): inpatient rehabilitation facility, skilled nursing facility

## 2024-10-26 NOTE — PLAN OF CARE
Goal Outcome Evaluation:  Plan of Care Reviewed With: patient      Patient is a 59 y.o. female admitted to [facility] on 10/22/2024 for STEMI, s/p stenting, L MCA CVA. Unable to obtain patient information d/t aphasia. Pt supine in bed at start of session, soft wrist restrain on LUE, Flaccid RUE. Today, patient performed bed mobility with min/modA. Pt was not following directions, unsafe to attempt transfer or standing this session. Strength, balance, and activity tolerance deficits noted. Patient may benefit from skilled PT services acutely to address functional deficits as well as improve level of independence prior to discharge. Anticipate IRF upon DC.       Anticipated Discharge Disposition (PT): inpatient rehabilitation facility

## 2024-10-26 NOTE — PROGRESS NOTES
"  PROGRESS NOTE  Patient Name: Albina Sosa  Age/Sex: 59 y.o. female  : 1965  MRN: [id number]    Date of Admission: 10/22/2024  Date of Encounter Visit: 10/26/24   LOS: 4 days   Patient Care Team:  Germaine James APRN as PCP - General (Nurse Practitioner)    Chief Complaint: Acute CVA, severe cardiomyopathy    Hospital course: Patient is doing better, she was liberated from the ventilator.  She got 1 dose of Lasix yesterday and her kidney function is actually better, she is not on any pressors.  She is on dual antiplatelet therapy  She is afebrile  White count is at 11, patient is doing well on 2 L nasal cannula oxygen with no respiratory distress or congestion  She is on tube feeding for dysphagia  She is on the Lantus 25 with sliding scale, the goal is to keep blood sugar in tight control        REVIEW OF SYSTEMS:   CONSTITUTIONAL: no fever or chills  System review patient is nonverbal    Ventilator/Non-Invasive Ventilation Settings (From admission, onward)         Vital Signs  Temp:  [97.5 °F (36.4 °C)-98.7 °F (37.1 °C)] 97.5 °F (36.4 °C)  Heart Rate:  [] 103  Resp:  [19-24] 20  BP: ()/(61-93) 121/85  SpO2:  [91 %-100 %] 94 %  on  Flow (L/min) (Oxygen Therapy):  [2-4] 2 Device (Oxygen Therapy): nasal cannula    Intake/Output Summary (Last 24 hours) at 10/26/2024 0837  Last data filed at 10/26/2024 0530  Gross per 24 hour   Intake 2719.01 ml   Output 3400 ml   Net -680.99 ml     Flowsheet Rows      Flowsheet Row First Filed Value   Admission Height 154.9 cm (61\") Documented at 10/22/2024 1700   Admission Weight 54 kg (119 lb 0.8 oz) Documented at 10/22/2024 1214          Body mass index is 20.45 kg/m².      10/23/24  1252 10/24/24  0500 10/26/24  0200   Weight: 56.2 kg (124 lb) 51.1 kg (112 lb 10.5 oz) 49.1 kg (108 lb 3.9 oz)       Physical Exam:  GEN:  No acute distress, alert, cooperative, well developed she is responsive, does follow basic commands, but is nonverbal  EYES:   Sclerae " clear. No icterus. PERRL. Normal EOM  ENT:   External ears/nose normal, no oral lesions, no thrush, mucous membranes moist, right-sided facial droop  NECK:  Supple, midline trachea, no JVD  LUNGS: Normal chest on inspection, CTAB, no wheezes. No rhonchi. No crackles. Respirations regular, even and unlabored.   CV:  Regular rhythm and rate. Normal S1/S2. No murmurs, gallops, or rubs noted.  ABD:  Soft, nontender and nondistended. Normal bowel sounds. No guarding  EXT: Normal movement on the left, very weak on the right but does withdraw to painful stimuli no cyanosis. No redness. No edema.   Skin: Dry, intact, no bleeding    Results Review:    Results From Last 14 Days   Lab Units 10/23/24  0843 10/23/24  0236 10/22/24  2251   LACTATE mmol/L 2.4* 3.5* 5.1*   TRIGLYCERIDES mg/dL  --  311*  --      Results from last 7 days   Lab Units 10/26/24  0307 10/25/24  0404 10/24/24  1502 10/24/24  0333 10/23/24  2258 10/23/24  0236 10/22/24  1939 10/22/24  1150   SODIUM mmol/L 148* 144  --  136  --  139 129* 125*   POTASSIUM mmol/L 3.3* 4.0 4.9 3.6 3.8 3.5 4.3 5.1   CHLORIDE mmol/L 110* 111*  --  100  --  101 90* 86*   CO2 mmol/L 26.3 23.7  --  24.8  --  25.4 23.0 24.3   BUN mg/dL 34* 34*  --  30*  --  21* 22* 22*   CREATININE mg/dL 1.38* 1.64*  --  1.63*  --  1.54* 1.47* 1.35*   CALCIUM mg/dL 8.8 8.4*  --  9.4  --  11.0* 11.3* 10.2   AST (SGOT) U/L  --   --   --  58*  --   --  138* 80*   ALT (SGPT) U/L  --   --   --  24  --   --  39* 41*   ANION GAP mmol/L 11.7 9.3  --  11.2  --  12.6 16.0* 14.7   ALBUMIN g/dL 2.7* 2.4*  --  2.8*  --   --  3.2* 3.8     Results from last 7 days   Lab Units 10/22/24  1646 10/22/24  1150   HSTROP T ng/L >10,000* 3,432*         Results from last 7 days   Lab Units 10/22/24  1150   PROBNP pg/mL 26,236.0*     Results from last 7 days   Lab Units 10/26/24  0307 10/25/24  0404 10/24/24  0333 10/23/24  0236 10/22/24  1646 10/22/24  1637 10/22/24  1150   WBC 10*3/mm3 11.13* 10.98* 15.34* 14.22* 17.94*   --  13.48*   HEMOGLOBIN g/dL 12.4 11.1* 12.2 14.8 15.2  --  15.9   HEMOGLOBIN, POC g/dL  --   --   --   --   --  16.6  --    HEMATOCRIT % 36.6 34.0 36.5 43.0 47.2*  --  48.4*   HEMATOCRIT POC %  --   --   --   --   --  49  --    PLATELETS 10*3/mm3 359 316 304 397 361  --  363   MCV fL 92.0 89.2 88.2 86.7 91.7  --  89.8   NEUTROPHIL % %  --   --   --   --  76.6*  --  83.2*   LYMPHOCYTE % %  --   --   --   --  13.7*  --  9.7*   MONOCYTES % %  --   --   --   --  8.6  --  6.3   EOSINOPHIL % %  --   --   --   --  0.1*  --  0.1*   BASOPHIL % %  --   --   --   --  0.4  --  0.4   IMM GRAN % %  --   --   --   --  0.6*  --  0.3     Results from last 7 days   Lab Units 10/23/24  0843 10/22/24  1722 10/22/24  1410 10/22/24  1150   INR   --   --   --  1.02   APTT seconds 23.6 49.8* 133.1* 22.4*         Results from last 7 days   Lab Units 10/23/24  0236   CHOLESTEROL mg/dL 245*   TRIGLYCERIDES mg/dL 311*   HDL CHOL mg/dL 37*     Results from last 7 days   Lab Units 10/25/24  0407 10/24/24  1145 10/24/24  0312 10/23/24  0406 10/22/24  1637   PH, ARTERIAL pH units 7.463* 7.463* 7.504* 7.540* 7.375   PCO2, ARTERIAL mm Hg 33.2* 35.9 33.6* 31.6* 41.7   PO2 ART mm Hg 66.0* 83.7 73.3* 90.4 77.4*   HCO3 ART mmol/L 23.8 25.7 26.4 27.0 24.4     Results from last 7 days   Lab Units 10/23/24  0236   HEMOGLOBIN A1C % 14.70*     Glucose   Date/Time Value Ref Range Status   10/26/2024 0730 260 (H) 70 - 130 mg/dL Final   10/26/2024 0416 179 (H) 70 - 130 mg/dL Final   10/26/2024 0051 147 (H) 70 - 130 mg/dL Final   10/25/2024 1957 150 (H) 70 - 130 mg/dL Final   10/25/2024 1625 194 (H) 70 - 130 mg/dL Final   10/25/2024 1133 226 (H) 70 - 130 mg/dL Final   10/25/2024 0732 221 (H) 70 - 130 mg/dL Final   10/25/2024 0449 169 (H) 70 - 130 mg/dL Final     Results from last 7 days   Lab Units 10/23/24  1134 10/23/24  0843 10/23/24  0236 10/22/24  2251 10/22/24  1939 10/22/24  1646 10/22/24  1637   PROCALCITONIN ng/mL 0.41*  --  0.40*  --   --   --   --     LACTATE mmol/L  --  2.4* 3.5* 5.1* 5.4* 5.9* 3.5*     Results from last 7 days   Lab Units 10/24/24  0405 10/23/24  1214 10/23/24  1134   BLOODCX   --  No growth at 2 days No growth at 2 days   RESPCX  Culture in progress  --   --      Results from last 7 days   Lab Units 10/23/24  0237   NITRITE UA  Negative   WBC UA /HPF 11-20*   BACTERIA UA /HPF None Seen   SQUAM EPITHEL UA /HPF 0-2                   Imaging:   Imaging Results (All)               I reviewed the patient's new clinical results.  I personally viewed and interpreted the patient's imaging results:        Medication Review:   aspirin, 81 mg, Nasogastric, Daily  atorvastatin, 40 mg, Oral, Nightly  chlorhexidine, 15 mL, Mouth/Throat, Q12H  clopidogrel, 75 mg, Nasogastric, Daily  enoxaparin, 30 mg, Subcutaneous, Nightly  famotidine, 20 mg, Nasogastric, Daily  insulin glargine, 25 Units, Subcutaneous, Q12H  insulin regular, 4-24 Units, Subcutaneous, Q4H  ipratropium-albuterol, 3 mL, Nebulization, 4x Daily - RT  metoprolol tartrate, 12.5 mg, Oral, Q12H  piperacillin-tazobactam, 3.375 g, Intravenous, Q8H  potassium chloride ER, 40 mEq, Oral, Q4H  venlafaxine, 37.5 mg, Nasogastric, BID             ASSESSMENT:   Acute left MCA ischemic stroke, status post TNK and thrombectomy  Acute STEMI status post primary PCI  Acute respiratory failure requiring invasive mechanical ventilation  CAD with history CABG and prior stents  Ischemic cardiomyopathy with EF 10%  Uncontrolled hyperglycemia with blood sugar very high  Type 2 diabetes mellitus  COPD  Current cigarette smoker  Obesity  Hyponatremia  Elevated liver enzymes  NICHO  Fever  Positive UDS  Peripheral vascular disease    PLAN:  Chest has been satisfactory until 730 this morning was a high reading of 260.  White count is relatively stable at 11, hemoglobin is stable with good platelet count  Kidney function is showing improvement in creatinine down to 1.38, electrolytes are acceptable with mild hyponatremia at  148 and mild hypokalemia 3.3.  Blood pressure is on the lower end of normal, patient is on 2 L nasal cannula oxygen    Recommendations:  Wean oxygen as tolerated  Antiplatelet therapy per cardiology  Patient is on Zosyn for today so for, will transition to oral antibiotic for couple more days and discontinue.  All cultures have been negative to date but the patient has positive procalcitonin and leukocytosis which did improve with antibiotics  Adjust Lantus dose  Okay to transfer out of the ICU per neurology  Will consult internal medicine to further follow-up on her medical issues, diabetes control    Discussed with nursing staff at the bedside  Labs/Notes/films were independently reviewed and pertinent results are summarized above  The copied texts in this note were reviewed and they are accurate as of 10/26/24    Disposition: Stepdown unit    Alek Bauer MD  10/26/24  08:37 EDT            Dictated utilizing Dragon dictation

## 2024-10-26 NOTE — PROGRESS NOTES
"DOS: 10/26/2024  NAME: Albina Sosa   : 1965  PCP: Germaine James APRN  Chief Complaint   Patient presents with    Shortness of Breath    Chest Pain       Chief complaint: Left MCA stroke, recent STEMI, heart failure with reduced ejection fraction  Subjective: Patient seen and examined at the bedside this morning, patient got extubated yesterday and tolerating well.    Objective:  Vital signs: /97   Pulse 104   Temp 97.5 °F (36.4 °C) (Oral)   Resp 20   Ht 154.9 cm (61\")   Wt 49.1 kg (108 lb 3.9 oz)   SpO2 94%   BMI 20.45 kg/m²    Gen: NAD, vitals reviewed  MS: Eyes spontaneously open but did not follow any commands, globally aphasic.  CN: Blink to threat on the right, blinks to threat on the left, pupils 2 mm reactive to light, VOR present, corneals present, right lower facial droop, unable to assess dysarthria.    Motor: Spontaneously moving the left side, slight withdrawal on the right upper and lower extremity, normal tone  Sensory: Unable to assess due to patient condition    Laboratory results:  Lab Results   Component Value Date    GLUCOSE 168 (H) 10/26/2024    CALCIUM 8.8 10/26/2024     (H) 10/26/2024    K 3.3 (L) 10/26/2024    CO2 26.3 10/26/2024     (H) 10/26/2024    BUN 34 (H) 10/26/2024    CREATININE 1.38 (H) 10/26/2024    EGFRIFAFRI 69 2022    EGFRIFNONA 60 2022    BCR 24.6 10/26/2024    ANIONGAP 11.7 10/26/2024     Lab Results   Component Value Date    WBC 11.13 (H) 10/26/2024    HGB 12.4 10/26/2024    HCT 36.6 10/26/2024    MCV 92.0 10/26/2024     10/26/2024     Lab Results   Component Value Date     (H) 10/23/2024    LDL  2023      Comment:        LDL cholesterol not calculated. Triglyceride levels  greater than 400 mg/dL invalidate calculated LDL results.    Reference range: <100    Desirable range <100 mg/dL for primary prevention;    <70 mg/dL for patients with CHD or diabetic patients   with > or = 2 CHD risk factors.    LDL-C " is now calculated using the Narciso-Ortiz   calculation, which is a validated novel method providing   better accuracy than the Friedewald equation in the   estimation of LDL-C.   Narciso SS et al. HIMA. 2013;310(19): 6359-4738   (http://education.[employer].com/faq/PRP914)    LDL  07/18/2023      Comment:        LDL cholesterol not calculated. Triglyceride levels  greater than 400 mg/dL invalidate calculated LDL results.    Reference range: <100    Desirable range <100 mg/dL for primary prevention;    <70 mg/dL for patients with CHD or diabetic patients   with > or = 2 CHD risk factors.    LDL-C is now calculated using the Narciso-Ortiz   calculation, which is a validated novel method providing   better accuracy than the Friedewald equation in the   estimation of LDL-C.   Narciso SS et al. HIMA. 2013;310(19): 6287-5921   (http://education.[employer].Recurve/faq/RRY355)         Lab 10/23/24  0236   HEMOGLOBIN A1C 14.70*        Review of labs: Blood Glucose 260 this morning.     Review and interpretation of imaging: No new brain imaging to review.     Workup:  -MRI brain:  1. Moderate to large acute left middle cerebral artery distribution  infarction with involvement of the left posterior left frontal lobe,  left parietal lobe, left occipital lobe. Suspect small foci of petechial  hemorrhage associated with the infarction without evidence for large  hemorrhage or shift of the midline structures.  2. Small chronic lacunar infarctions within the right corona radiata,  right gloria, posterior right caudate head.     CT head 24-hour post TNK which showed left MCA hypodensity with no acute hemorrhagic transformation.       Repeat CT head morning 10/24: Personally reviewed stable compared to prior exam from yesterday, no obvious hemorrhagic transformation.     CTA H/N: Could not complete initially due to patient agitation.     2D echo: EF 14%, borderline left atrial dilatation, no mention about LVT.    Carotid  ultrasound showed non significant less than 50% stenosis of the ICAs bilaterally.    Stroke labs: A1c 14.7,      Diagnoses:  -Left MCA acute ischemic stroke cardioembolic status post TNK and mechanical thrombectomy  -Left M2 occlusion status post mechanical thrombectomy [id number]  -Significant hyperglycemia on presentation  -Uncontrolled type 2 diabetes  -Meth abuse  -Recent myocardial infarction status post stenting  -Cardiomyopathy  -Heart failure with reduced ejection fraction  -Tobacco abuse        Comment: Overall her exam is stable compared to yesterday.     Plan:  -Okay to transfer out of the ICU from stroke standpoint  -Continue dual antiplatelet therapy giving her recent stenting.  -Goal LDL less than 70 currently at 150, Lipitor 80 mg daily  -Goal blood sugar less than 140-180 range, avoid hyperglycemia above 180 or hypoglycemia less than 100.    -Goal blood pressure less than 140 systolic, recommend strict blood pressure control to prevent hemorrhagic transformation.  -Lovenox for DVT prophylaxis  -Out of bed PT/OT   -Goal A1c less than 7 currently at 14.7, consult diabetic educator when appropriate  -Patient is critically ill with high risk of complications  - regarding tobacco abuse and meth abuse when appropriate.  -Checked TSH      Management discussed with nursing staff, Dr. Bauer

## 2024-10-26 NOTE — THERAPY EVALUATION
Patient Name: Albina Sosa  : 1965    MRN: [id number]                              Today's Date: 10/26/2024       Admit Date: 10/22/2024    Visit Dx:     ICD-10-CM ICD-9-CM   1. Acute ST elevation myocardial infarction (STEMI), unspecified artery  I21.3 410.90   2. Smoker  F17.200 305.1   3. H/O medication noncompliance  Z91.148 V15.81   4. Acute pulmonary edema  J81.0 518.4     Patient Active Problem List   Diagnosis    STEMI involving left circumflex coronary artery    Acute ischemic left middle cerebral artery (MCA) stroke     History reviewed. No pertinent past medical history.  Past Surgical History:   Procedure Laterality Date    CARDIAC CATHETERIZATION N/A 10/22/2024    Procedure: Left Heart Cath;  Surgeon: Bret Lawrence MD;  Location: [facility] CATH INVASIVE LOCATION;  Service: Cardiovascular;  Laterality: N/A;    CARDIAC CATHETERIZATION N/A 10/22/2024    Procedure: Stent LAM coronary;  Surgeon: Bret Lawrence MD;  Location: [facility] CATH INVASIVE LOCATION;  Service: Cardiovascular;  Laterality: N/A;    CARDIAC CATHETERIZATION N/A 10/22/2024    Procedure: Percutaneous Coronary Intervention;  Surgeon: Bret Lawrence MD;  Location: [facility] CATH INVASIVE LOCATION;  Service: Cardiovascular;  Laterality: N/A;    CARDIAC CATHETERIZATION N/A 10/22/2024    Procedure: Coronary angiography;  Surgeon: Bret Lawrence MD;  Location: [facility] CATH INVASIVE LOCATION;  Service: Cardiovascular;  Laterality: N/A;    EMBOLECTOMY N/A 10/22/2024    Procedure: EMBOLECTOMY MECHANICAL;  Surgeon: Jeremiah Carey MD;  Location: [facility] HYBRID OR;  Service: Interventional Radiology;  Laterality: N/A;      General Information       Row Name 10/26/24 1410          OT Time and Intention    Document Type evaluation  -BC     Mode of Treatment individual therapy;occupational therapy  -BC     Patient Effort other (see comments)  limited by cognitive function at this time and lethargy following CVA.  -BC       Row Name  10/26/24 1410          General Information    Patient Profile Reviewed yes  -BC     Prior Level of Function --  WEST; per chart appears IND PLF  -BC     Existing Precautions/Restrictions fall  -BC     Barriers to Rehab cognitive status;medically complex  -BC       Row Name 10/26/24 1410          Living Environment    People in Home parent(s)  -BC       Row Name 10/26/24 1410          Cognition    Orientation Status (Cognition) unable/difficult to assess  -BC       Row Name 10/26/24 1410          Safety Issues/Impairments Affecting Functional Mobility    Safety Issues Affecting Function (Mobility) ability to follow commands;at risk behavior observed;awareness of need for assistance;impulsivity;insight into deficits/self-awareness;judgment;problem-solving;safety precaution awareness;sequencing abilities  -BC     Impairments Affecting Function (Mobility) balance;cognition;endurance/activity tolerance;coordination;grasp;motor control;motor planning;muscle tone abnormal;postural/trunk control;range of motion (ROM);sensation/sensory awareness;strength;visual/perceptual  -BC     Cognitive Impairments, Mobility Safety/Performance attention;impulsivity;insight into deficits/self-awareness;judgment;problem-solving/reasoning;safety precaution awareness;safety precaution follow-through;sequencing abilities  -BC               User Key  (r) = Recorded By, (t) = Taken By, (c) = Cosigned By      Initials Name Provider Type    BC Josselin Shaikh OT Occupational Therapist                     Mobility/ADL's       Row Name 10/26/24 1413          Bed Mobility    Bed Mobility supine-sit;sit-supine  -BC     Supine-Sit [place] (Bed Mobility) dependent (less than 25% patient effort);1 person assist  -BC     Sit-Supine [place] (Bed Mobility) dependent (less than 25% patient effort);2 person assist  -BC     Bed Mobility, Safety Issues cognitive deficits limit understanding;decreased use of arms for pushing/pulling;decreased use of  legs for bridging/pushing;impaired trunk control for bed mobility  -BC     Assistive Device (Bed Mobility) bed rails;head of bed elevated  -BC     Comment, (Bed Mobility) Pt was unable to carryout techniques or cues for transition up to EOB with lethargy, difficulty w/ attention and dep AX1 less than 25% assist from pt to sit up.  -BC       Row Name 10/26/24 1413          Transfers    Transfers sit-stand transfer;stand-sit transfer  -BC     Comment, (Transfers) unsafe  -BC       Row Name 10/26/24 1413          Sit-Stand Transfer    Sit-Stand [place] (Transfers) maximum assist (25% patient effort);1 person assist  -BC     Comment, (Sit-Stand Transfer) in effort to increase alertness, active participation and determine any functional use RLE on 3 count Pt tolerated assist of  1 sit<>Stand from EOB. Attempting weightshift towards HOB w/ no success.  -BC       Row Name 10/26/24 1413          Stand-Sit Transfer    Stand-Sit [place] (Transfers) dependent (less than 25% patient effort);2 person assist  -BC     Comment, (Stand-Sit Transfer) assist of 1 dependently to attempt to transition back to EOB but height elevated given short stature and poor active participation, sliding off EOB requiring quick transition from EOB back to supine AX2.  -BC       Row Name 10/26/24 1413          Functional Mobility    Patient was able to Ambulate no, other medical factors prevent ambulation  -BC       Row Name 10/26/24 1413          Activities of Daily Living    BADL Assessment/Intervention other (see comments)  total/dependent A care for all ADLs.  -BC               User Key  (r) = Recorded By, (t) = Taken By, (c) = Cosigned By      Initials Name Provider Type    BC Josselin Shaikh OT Occupational Therapist                   Obj/Interventions       Row Name 10/26/24 1417          Sensory Assessment (Somatosensory)    Sensory Assessment (Somatosensory) unable/difficult to assess  -BC     Sensory Assessment did withdrawal RUE  with nailbed pressure from sh abd and elbow extension into flexor synergy pattern into side. No sensory response or wincing to toe nail bed pressure.  -BC       Row Name 10/26/24 1417          Vision Assessment/Intervention    Visual Impairment/Limitations unable/difficult to assess  -BC     Vision Assessment Comment WEST; Pt did not maintain eyes open during session, only for brief intervals.  -BC       Row Name 10/26/24 1417          Range of Motion Comprehensive    General Range of Motion other (see comments)  -BC     Comment, General Range of Motion R stacy --zero to trace active response  -BC       Row Name 10/26/24 1417          Strength Comprehensive (MMT)    Comment, General Manual Muscle Testing (MMT) Assessment substantial deficts R hemiplegia; Unable to follow commands for full assessment. Did not follow direction for LUE/LLE mvmts but spontaneous mvmts seen.  -BC       Row Name 10/26/24 1417          Balance    Balance Assessment sitting static balance;sit to stand dynamic balance;standing static balance;standing dynamic balance  -BC     Static Sitting Balance minimal assist;moderate assist  -BC     Position, Sitting Balance sitting edge of bed  -BC     Sit to Stand Dynamic Balance maximum assist  -BC     Static Standing Balance maximum assist;1-person assist  -BC     Dynamic Standing Balance maximum assist;2-person assist  -BC     Comment, Balance sitting unsupported Pt with eyes closed but did ellicit some response at trunk to hold self up. Max A x1 to stand while PCA present to adjust linens. pt surprisingly was able to sustain stand w/o buckling with max Ax1 ~10 secs. PCA then assisting w/ attempt to weightshift/side step to HOB W AX2 but poor carryout.  -BC               User Key  (r) = Recorded By, (t) = Taken By, (c) = Cosigned By      Initials Name Provider Type    BC Josselin Shaikh OT Occupational Therapist                   Goals/Plan       Row Name 10/26/24 4369          Bed Mobility Goal 1  (OT)    Activity/Assistive Device (Bed Mobility Goal 1, OT) bed mobility activities, all  -BC     [place] Level/Cues Needed (Bed Mobility Goal 1, OT) minimum assist (75% or more patient effort)  -BC     Time Frame (Bed Mobility Goal 1, OT) short term goal (STG);2 weeks  -BC     Progress/Outcomes (Bed Mobility Goal 1, OT) new goal  -BC       Row Name 10/26/24 1434          Transfer Goal 1 (OT)    Activity/Assistive Device (Transfer Goal 1, OT) sit-to-stand/stand-to-sit;bed-to-chair/chair-to-bed  -BC     [place] Level/Cues Needed (Transfer Goal 1, OT) moderate assist (50-74% patient effort)  -BC     Time Frame (Transfer Goal 1, OT) short term goal (STG);2 weeks  -BC     Progress/Outcome (Transfer Goal 1, OT) new goal  -BC       Row Name 10/26/24 1434          Dressing Goal 1 (OT)    Activity/Device (Dressing Goal 1, OT) upper body dressing  -BC     [place]/Cues Needed (Dressing Goal 1, OT) moderate assist (50-74% patient effort)  -BC     Time Frame (Dressing Goal 1, OT) short term goal (STG);2 weeks  -BC     Progress/Outcome (Dressing Goal 1, OT) new goal  -BC       Row Name 10/26/24 1434          Grooming Goal 1 (OT)    Activity/Device (Grooming Goal 1, OT) wash face, hands  -BC     [place] (Grooming Goal 1, OT) minimum assist (75% or more patient effort)  -BC     Time Frame (Grooming Goal 1, OT) 2 weeks;short term goal (STG)  -BC     Strategies/Barriers (Grooming Goal 1, OT) seated  -BC     Progress/Outcome (Grooming Goal 1, OT) new goal  -BC       Row Name 10/26/24 1436          Self-Feeding Goal 1 (OT)    Activity/Device (Self-Feeding Goal 1, OT) scoop food and bring to mouth  -BC     [place] Level/Cues Needed (Self-Feeding Goal 1, OT) verbal cues required;minimum assist (75% or more patient effort)  -BC     Time Frame (Self-Feeding Goal 1, OT) short term goal (STG);2 weeks  -BC     Progress/Outcomes (Self-Feeding Goal 1, OT) new goal  -BC       Row Name 10/26/24 1436          Problem  Specific Goal 1 (OT)    Problem Specific Goal 1 (OT) Pt will increase trunk control and comprehension skills to sit up unsupported w/ CGA for balance ~6 mins in prep for future ADL participation  -BC     Time Frame (Problem Specific Goal 1, OT) 2 weeks;short term goal (STG)  -BC     Progress/Outcome (Problem Specific Goal 1, OT) new goal  -BC       Row Name 10/26/24 1434          Therapy Assessment/Plan (OT)    Planned Therapy Interventions (OT) activity tolerance training;BADL retraining;cognitive/visual perception retraining;functional balance retraining;neuromuscular control/coordination retraining;passive ROM/stretching;patient/caregiver education/training;ROM/therapeutic exercise;strengthening exercise;transfer/mobility retraining  -BC               User Key  (r) = Recorded By, (t) = Taken By, (c) = Cosigned By      Initials Name Provider Type    BC Josselin Shaikh, SHAHZAD Occupational Therapist                   Clinical Impression       Row Name 10/26/24 1423          Pain Assessment    Pre/Posttreatment Pain Comment did not appear in any distress or pain at this time, unable to verbalize.  -BC       Row Name 10/26/24 1423          Plan of Care Review    Plan of Care Reviewed With --  pt unable to demo teachback for POC  -BC     Progress no change  -BC     Outcome Evaluation Pt is a 58 y/o M admitted to [facility] 10/22 with Left MCA stroke, recent STEMI s/p stenting, heart failure with reduced ejection fraction. Hx of uncontrolled type 2 diabetes, meth abuse, cardiomyopathy, and tobacco abuse. Upon entering room Pt with eyes closed, unable to arouse initially to OT. completed assessment and appropriate to continue to sitting from supine in attempt to get any neuro feedback/stimulation, alertness/arousal for future ADL participation and per stroke recovery. Pt with eyes intermittently open but mostly closed but was able to assist some w/ holding herself up EOB unsupported w min to mod A. Pt was also able to tolerate  standing /WBing through BLEs for NMR /feebback but otherwise not active mvmts seen in functional response. Will trial OT services with goal to increase ADL participation, for sitting balance and NMR training, increased communication/comprehension skills for active /functional participation in tasks. DC REC pending ability to progress while IP IRF vs ADILIA recs. Will trial high frequency 5x/week and if Pts function does not improve then recommending decrease POC.  -BC       Row Name 10/26/24 1423          Therapy Assessment/Plan (OT)    Rehab Potential (OT) fair  -BC     Criteria for Skilled Therapeutic Interventions Met (OT) yes;meets criteria;skilled treatment is necessary  -BC     Therapy Frequency (OT) 5 times/wk  -BC     Predicted Duration of Therapy Intervention (OT) 2 weeks  -BC       Row Name 10/26/24 1423          Therapy Plan Review/Discharge Plan (OT)    Anticipated Discharge Disposition (OT) inpatient rehabilitation facility;skilled nursing facility  -BC       Row Name 10/26/24 1423          Vital Signs    Post Systolic BP Rehab 140  -BC     Post Treatment Diastolic BP 98  -BC     Posttreatment Heart Rate (beats/min) 104  -BC     Post SpO2 (%) 95  -BC     Pre Patient Position Supine  -BC     Intra Patient Position Sitting  -BC     Post Patient Position Supine  -BC       Row Name 10/26/24 1423          Positioning and Restraints    Pre-Treatment Position in bed  -BC     Post Treatment Position bed  -BC     In Bed notified nsg;with nsg;exit alarm on;fowlers;side rails up x3  -BC     Restraints reapplied:;notified nsg:;soft limb  -BC               User Key  (r) = Recorded By, (t) = Taken By, (c) = Cosigned By      Initials Name Provider Type    Josselin Rodriguez OT Occupational Therapist                   Outcome Measures       Row Name 10/26/24 6347          How much help from another is currently needed...    Putting on and taking off regular lower body clothing? 1  -BC     Bathing (including washing,  rinsing, and drying) 1  -BC     Toileting (which includes using toilet bed pan or urinal) 1  -BC     Putting on and taking off regular upper body clothing 1  -BC     Taking care of personal grooming (such as brushing teeth) 1  -BC     Eating meals 1  -BC     AM-PAC 6 Clicks Score (OT) 6  -BC       Row Name 10/26/24 1435          Modified [place] Scale    Pre-Stroke Modified [place] Scale 0 - No Symptoms at all.  -BC     Modified [place] Scale 5 - Severe disability.  Bedridden, incontinent, and requiring constant nursing care and attention.  -BC       Row Name 10/26/24 1435          Functional Assessment    Outcome Measure Options AM-PAC 6 Clicks Daily Activity (OT);Modified [place]  -BC               User Key  (r) = Recorded By, (t) = Taken By, (c) = Cosigned By      Initials Name Provider Type    Josselin Rodriguez OT Occupational Therapist                    Occupational Therapy Education       Title: PT OT SLP Therapies (In Progress)       Topic: Occupational Therapy (In Progress)       Point: ADL training (Not Started)       Description:   Instruct learner(s) on proper safety adaptation and remediation techniques during self care or transfers.   Instruct in proper use of assistive devices.                  Learner Progress:  Not documented in this visit.              Point: Home exercise program (Not Started)       Description:   Instruct learner(s) on appropriate technique for monitoring, assisting and/or progressing therapeutic exercises/activities.                  Learner Progress:  Not documented in this visit.              Point: Precautions (Not Started)       Description:   Instruct learner(s) on prescribed precautions during self-care and functional transfers.                  Learner Progress:  Not documented in this visit.              Point: Body mechanics (In Progress)       Description:   Instruct learner(s) on proper positioning and spine alignment during self-care, functional mobility activities  and/or exercises.                  Learning Progress Summary            Patient Nonacceptance, E, NL by BC at 10/26/2024 6402    Comment: cognitive deficits from CVA limit carryout of any education, attempting [place] A w/ bed mobiltiy and sitting balance retraining                                      User Key       Initials Effective Dates Name Provider Type Discipline    BC 07/29/24 -  Josselin Shaikh OT Occupational Therapist OT                  OT Recommendation and Plan  Planned Therapy Interventions (OT): activity tolerance training, BADL retraining, cognitive/visual perception retraining, functional balance retraining, neuromuscular control/coordination retraining, passive ROM/stretching, patient/caregiver education/training, ROM/therapeutic exercise, strengthening exercise, transfer/mobility retraining  Therapy Frequency (OT): 5 times/wk  Plan of Care Review  Plan of Care Reviewed With:  (pt unable to demo teachback for POC)  Progress: no change  Outcome Evaluation: Pt is a 58 y/o M admitted to [facility] 10/22 with Left MCA stroke, recent STEMI s/p stenting, heart failure with reduced ejection fraction. Hx of uncontrolled type 2 diabetes, meth abuse, cardiomyopathy, and tobacco abuse. Upon entering room Pt with eyes closed, unable to arouse initially to OT. completed assessment and appropriate to continue to sitting from supine in attempt to get any neuro feedback/stimulation, alertness/arousal for future ADL participation and per stroke recovery. Pt with eyes intermittently open but mostly closed but was able to assist some w/ holding herself up EOB unsupported w min to mod A. Pt was also able to tolerate standing /WBing through BLEs for NMR /feebback but otherwise not active mvmts seen in functional response. Will trial OT services with goal to increase ADL participation, for sitting balance and NMR training, increased communication/comprehension skills for active /functional participation in tasks. DC REC pending  ability to progress while IP IRF vs ADILIA recs. Will trial high frequency 5x/week and if Pts function does not improve then recommending decrease POC.     Time Calculation:   Evaluation Complexity (OT)  Review Occupational Profile/Medical/Therapy History Complexity: expanded/moderate complexity  Assessment, Occupational Performance/Identification of Deficit Complexity: 3-5 performance deficits  Clinical Decision Making Complexity (OT): detailed assessment/moderate complexity  Overall Complexity of Evaluation (OT): moderate complexity     Time Calculation- OT       Row Name 10/26/24 1437             Time Calculation- OT    OT Start Time 1110  -BC      OT Stop Time 1123  -BC      OT Time Calculation (min) 13 min  -BC      Total Timed Code Minutes- OT 0 minute(s)  -BC      OT Received On 10/26/24  -BC      OT - Next Appointment 10/28/24  -BC      OT Goal Re-Cert Due Date 11/09/24  -BC                User Key  (r) = Recorded By, (t) = Taken By, (c) = Cosigned By      Initials Name Provider Type    BC Josselin Shaikh, OT Occupational Therapist                  Therapy Charges for Today       Code Description Service Date Service Provider Modifiers Qty    [id number]  OT EVAL MOD COMPLEXITY 3 10/26/2024 Josselin Shaikh OT GO 1                 Josselin Shaikh OT  10/26/2024

## 2024-10-27 NOTE — PROGRESS NOTES
Name: Albina Sosa ADMIT: 10/22/2024   : 1965  PCP: Germaine James APRN    MRN: [id number] LOS: 5 days   AGE/SEX: 59 y.o. female  ROOM: [facility]     Subjective   Subjective     NIH remains 25 per RN.   She spontaneously moves left upper and lower extremity.  Patient is nonverbal does not interact much with her environment.         Objective   Objective   Vital Signs  Temp:  [98.1 °F (36.7 °C)-99.9 °F (37.7 °C)] 98.4 °F (36.9 °C)  Heart Rate:  [100-125] 108  Resp:  [16-20] 20  BP: (109-141)/() 138/98  SpO2:  [86 %-100 %] 94 %  on  Flow (L/min) (Oxygen Therapy):  [2-3] 3;   Device (Oxygen Therapy): nasal cannula  Body mass index is 20.45 kg/m².    Physical Exam  Vitals and nursing note reviewed.   Constitutional:       Appearance: She is ill-appearing (Chronically ill-appearing).   Eyes:      General: No scleral icterus.     Conjunctiva/sclera: Conjunctivae normal.      Pupils: Pupils are equal, round, and reactive to light.   Cardiovascular:      Rate and Rhythm: Regular rhythm. Tachycardia present.      Pulses: Normal pulses.      Heart sounds: Normal heart sounds.   Pulmonary:      Effort: Pulmonary effort is normal. No respiratory distress.      Breath sounds: Normal breath sounds.   Abdominal:      General: Bowel sounds are normal. There is no distension.      Palpations: Abdomen is soft.      Tenderness: There is no abdominal tenderness.   Musculoskeletal:      Right lower leg: No edema.      Left lower leg: No edema.   Skin:     General: Skin is warm and dry.   Neurological:      Mental Status: She is alert.      Comments: Right hemiparesis, spontaneously moves left upper and lower extremity. Nonverbal. Right facial droop noted.                Results Review  I reviewed the patient's new clinical results.  Results from last 7 days   Lab Units 10/27/24  0527 10/26/24  0307 10/25/24  0404 10/24/24  0333   WBC 10*3/mm3 12.28* 11.13* 10.98* 15.34*   HEMOGLOBIN g/dL 12.4 12.4 11.1* 12.2   PLATELETS  10*3/mm3 486* 359 316 304     Results from last 7 days   Lab Units 10/26/24  2015 10/26/24  1025 10/26/24  0307 10/25/24  0404 10/24/24  1502 10/24/24  0333   SODIUM mmol/L 147*  --  148* 144  --  136   POTASSIUM mmol/L 4.0 3.7 3.3* 4.0   < > 3.6   CHLORIDE mmol/L 109*  --  110* 111*  --  100   CO2 mmol/L 28.2  --  26.3 23.7  --  24.8   BUN mg/dL 38*  --  34* 34*  --  30*   CREATININE mg/dL 1.39*  --  1.38* 1.64*  --  1.63*   GLUCOSE mg/dL 184*  --  168* 166*  --  178*    < > = values in this interval not displayed.     Lab Results   Component Value Date    ANIONGAP 9.8 10/26/2024     Estimated Creatinine Clearance: 34.2 mL/min (A) (by C-G formula based on SCr of 1.39 mg/dL (H)).   Lab Results   Component Value Date    EGFR 44.1 (L) 10/26/2024     Results from last 7 days   Lab Units 10/26/24  2015 10/26/24  0307 10/25/24  0404 10/24/24  0333 10/22/24  1939 10/22/24  1150   ALBUMIN g/dL 3.0* 2.7* 2.4* 2.8* 3.2* 3.8   BILIRUBIN mg/dL 0.3  --   --  0.4 0.7 0.6   ALK PHOS U/L 159*  --   --  115 153* 194*   AST (SGOT) U/L 51*  --   --  58* 138* 80*   ALT (SGPT) U/L 38*  --   --  24 39* 41*     Results from last 7 days   Lab Units 10/26/24  2015 10/26/24  0307 10/25/24  0404 10/24/24  0333   CALCIUM mg/dL 9.2 8.8 8.4* 9.4   ALBUMIN g/dL 3.0* 2.7* 2.4* 2.8*   PHOSPHORUS mg/dL  --  2.7 2.6 3.1     Results from last 7 days   Lab Units 10/23/24  1134 10/23/24  0843 10/23/24  0236 10/22/24  2251 10/22/24  1939   PROCALCITONIN ng/mL 0.41*  --  0.40*  --   --    LACTATE mmol/L  --  2.4* 3.5* 5.1* 5.4*     Glucose   Date/Time Value Ref Range Status   10/27/2024 0728 178 (H) 70 - 130 mg/dL Final   10/27/2024 0540 156 (H) 70 - 130 mg/dL Final   10/27/2024 0123 253 (H) 70 - 130 mg/dL Final   10/26/2024 2040 180 (H) 70 - 130 mg/dL Final   10/26/2024 1656 172 (H) 70 - 130 mg/dL Final   10/26/2024 1209 186 (H) 70 - 130 mg/dL Final   10/26/2024 0730 260 (H) 70 - 130 mg/dL Final       XR Chest 1 View    Result Date: 10/26/2024  No  significant interval change.  This report was finalized on 10/26/2024 5:31 AM by Dr. Angélica Gallo M.D on Workstation: [id number]      XR Chest 1 View    Result Date: 10/25/2024  Extensive bilateral alveolar and interstitial infiltrates, suggesting edema, significantly worsened when compared to prior study.  This report was finalized on 10/25/2024 10:43 PM by Dr. Angélica Gallo M.D on Workstation: [id number]         Current Facility-Administered Medications:     acetaminophen (TYLENOL) tablet 650 mg, 650 mg, Nasogastric, Q4H PRN, Alek Bauer MD, 650 mg at 10/24/24 2057    amoxicillin-clavulanate (AUGMENTIN) 500-125 MG per tablet 500 mg, 500 mg, Oral, Q12H, Alek Bauer MD, 500 mg at 10/26/24 2114    aspirin chewable tablet 81 mg, 81 mg, Nasogastric, Daily, Alek Bauer MD, 81 mg at 10/26/24 0847    atorvastatin (LIPITOR) tablet 40 mg, 40 mg, Oral, Nightly, Alek Bauer MD, 40 mg at 10/26/24 2129    Calcium Replacement - Follow Nurse / BPA Driven Protocol, , Does not apply, PRN, Alek Bauer MD    chlorhexidine (PERIDEX) 0.12 % solution 15 mL, 15 mL, Mouth/Throat, Q12H, Alek Bauer MD, 15 mL at 10/26/24 2119    clopidogrel (PLAVIX) tablet 75 mg, 75 mg, Nasogastric, Daily, Alek Bauer MD, 75 mg at 10/26/24 0847    dextrose (D50W) (25 g/50 mL) IV injection 25 g, 25 g, Intravenous, Q15 Min PRN, Alek Bauer MD    dextrose (GLUTOSE) oral gel 15 g, 15 g, Oral, Q15 Min PRN, Alek Bauer MD    Enoxaparin Sodium (LOVENOX) syringe 30 mg, 30 mg, Subcutaneous, Nightly, Alek Bauer MD, 30 mg at 10/26/24 2113    famotidine (PEPCID) tablet 20 mg, 20 mg, Nasogastric, Daily, Alek Bauer MD, 20 mg at 10/26/24 0849    glucagon (GLUCAGEN) injection 1 mg, 1 mg, Intramuscular, Q15 Min PRN, Alek Bauer MD    hydrALAZINE (APRESOLINE) injection 10 mg, 10 mg, Intravenous, Q4H PRN, Alek Bauer MD    HYDROcodone-acetaminophen (NORCO) 5-325 MG per tablet 1 tablet, 1 tablet, Nasogastric,  Q4H PRN, Alek Bauer MD, 1 tablet at 10/25/24 1435    insulin glargine (LANTUS, SEMGLEE) injection 30 Units, 30 Units, Subcutaneous, Q12H, Alek Bauer MD, 30 Units at 10/26/24 2114    insulin regular (humuLIN R,novoLIN R) injection 4-24 Units, 4-24 Units, Subcutaneous, Q4H, Alek Bauer MD, 12 Units at 10/27/24 0142    ipratropium-albuterol (DUO-NEB) nebulizer solution 3 mL, 3 mL, Nebulization, 4x Daily - RT, Alek Bauer MD, 3 mL at 10/27/24 0730    ipratropium-albuterol (DUO-NEB) nebulizer solution 3 mL, 3 mL, Nebulization, Q1H PRN, Alek Bauer MD    Magnesium Standard Dose Replacement - Follow Nurse / BPA Driven Protocol, , Does not apply, PRJuancarlos ZARATE Lebnan S, MD    metoprolol tartrate (LOPRESSOR) tablet 12.5 mg, 12.5 mg, Oral, Q12H, Alek Bauer MD, 12.5 mg at 10/26/24 2114    nitroglycerin (NITROSTAT) SL tablet 0.4 mg, 0.4 mg, Sublingual, Q5 Min PRN, Alek Bauer MD    OLANZapine (zyPREXA) injection 10 mg, 10 mg, Intramuscular, Once PRN, Alek Bauer MD    ondansetron ODT (ZOFRAN-ODT) disintegrating tablet 4 mg, 4 mg, Oral, Q6H PRN **OR** ondansetron (ZOFRAN) injection 4 mg, 4 mg, Intravenous, Q6H PRJuancarlos ZARATE Lebnan S, MD    Phosphorus Replacement - Follow Nurse / BPA Driven Protocol, , Does not apply, Juancarlos TAYLOR Lebnan S, MD    Potassium Replacement - Follow Nurse / BPA Driven Protocol, , Does not apply, Juancarlos TAYLOR Lebnan S, MD    Potassium Replacement - Follow Nurse / BPA Driven Protocol, , Does not apply, Juancarlos TAYLOR Lebnan S, MD    [COMPLETED] Insert Peripheral IV, , , Once **AND** sodium chloride 0.9 % flush 10 mL, 10 mL, Intravenous, PRN, Alek Bauer MD    venlafaxine (EFFEXOR) tablet 37.5 mg, 37.5 mg, Nasogastric, BID, Alek Bauer MD, 37.5 mg at 10/26/24 2114       Diet  NPO Diet NPO Type: Tube Feeding    Lab Results   Component Value Date    HGBA1C 14.70 (H) 10/23/2024    HGBA1C 8.5 (H) 07/20/2022    HGBA1C 8.2 (H) 07/07/2022     Glucose   Date/Time Value Ref Range  Status   10/27/2024 0728 178 (H) 70 - 130 mg/dL Final   10/27/2024 0540 156 (H) 70 - 130 mg/dL Final   10/27/2024 0123 253 (H) 70 - 130 mg/dL Final   10/26/2024 2040 180 (H) 70 - 130 mg/dL Final   10/26/2024 1656 172 (H) 70 - 130 mg/dL Final   10/26/2024 1209 186 (H) 70 - 130 mg/dL Final   10/26/2024 0730 260 (H) 70 - 130 mg/dL Final   10/26/2024 0416 179 (H) 70 - 130 mg/dL Final          Assessment/Plan     Active Hospital Problems    Diagnosis  POA    **STEMI involving left circumflex coronary artery [I21.21]  Yes    Hyperlipemia [E78.5]  Unknown    Acute hypoxic respiratory failure [J96.01]  Unknown    COPD (chronic obstructive pulmonary disease) [J44.9]  Unknown    Type 2 diabetes mellitus with hyperglycemia [E11.65]  Unknown    Ischemic cardiomyopathy [I25.5]  Unknown    Elevated liver enzymes [R74.8]  Unknown    Polysubstance abuse [F19.10]  Unknown    Peripheral vascular disease [I73.9]  Unknown    Acute ischemic left middle cerebral artery (MCA) stroke [I63.512]  No      Resolved Hospital Problems   No resolved problems to display.     59 y.o. female  admitted to [facility] via EMS 10/22/24 for STEMI. She has history of coronary artery disease with CAB with LIMA to the LAD, with hx of PCI to circumflex and left PDA, hypertension, mixed hyperlipidemia, type 2 diabetes on insulin therapy, prior CVA, history of methamphetamine use, and ongoing tobacco use with underlying lung disease.  She underwent emergent coronary catheterization and was found to have multivessel disease with severe cardiomyopathy and had a PCI with stent to culprit distal marginal branch with residual diffuse LAD and PDA disease with collaterals.  EF 14% by echo without obvious thrombus.  Post PCI she was noted to have increased agitation and right sided weakness with aphasia and stat neurology consult was placed and team D was activated.  CT of the head showed no acute hemorrhage or hypodensity, CTA of the head and neck was not performed at that  time due to agitation.  Patient was given TNK and sent for angiogram out of concern for left MCA disease and had mechanical cranial artery embolectomy.        Acute left MCA ischemic stroke  - status post TNK and embolectomy  - Neurology following.  Repeat head imaging negative for hemorrhagic conversion per nephrology  -Remains on dual antiplatelet therapy and statin therapy  -Neurology is recommending BP systolic 140 or less  with strict blood pressure control to prevent hemorrhagic transformation  -Neurology has okayed use of Lovenox for DVT prophylaxis  -Continue therapies PT and OT  -LDL goal less than 70     Acute STEMI status post primary PCI/stent / ischemic cardiomyopathy  -Cardiac cath with multivessel disease with PCI/stent to mid marginal branch  -EF 14% by echo without clear evidence of left ventricular thrombus  -Cardiology following  -Currently on aspirin and Plavix  -History of prior CAB.   - cath with residual with diffuse LAD and PDA disease with collaterals.   -Was noted to have severely elevated left ventricular end-diastolic pressures in the Cath Lab and she was given IV diuretics with improvement in her chest x-ray imaging.  -Will need to monitor volume status closely.     Hyperlipidemia poorly controlled / hypertriglyceridemia  -Currently on atorvastatin  -Noted total cholesterol back in 2023 was 403 with an LDL incalculable with triglycerides over a thousand.  -Admission lipid panel does show total cholesterol of 245, HDL 37 , triglycerides 311.  LDL HDL ratio is 3.94  - goal LDL less than 70  -Certainly her poorly controlled diabetes is contributing to her elevated triglycerides and subsequent elevated LDL.  Glycemic control is needed.  Unsure regarding adherence to medical therapies as outpt.    - 10/26 TSH check normal.      Acute hypoxic respiratory failure/COPD  -Initially requiring ventilator support . Liberated from ventilator 10/25 now on 3 L nasal cannula  -Pulmonary is  following  -Continue nebulizer with good pulmonary hygiene.      Type 2 diabetes, uncontrolled hyperglycemia   -A1c on admit 14.7  -Sugars over 600 on admission  -unclear on her adherence and DM regimen as an outpatient  -Currently on 30 units Lantus every 12.  Last dose adjustment 10/26 PM dosing and has only had 2 doses thus far of 30 units.    - FSBS trends improved  with mostly under 200.  Will give full 24 hours at current dosing before further adjustment.  Trend and reassess 10/27.   -She is on every 6 hour Accu-Cheks while on tube feeding with sliding scale insulin coverage.     History of tobacco use disorder   -Recommend full cessation of tobacco     Hyponatremia  -Initially hyponatremic on admission.  This has resolved now drifting a bit hypernatremic  -is on TF at 55 cc/hr.  She is getting 20 ml flush q 2 hours.  Will adjust free water and  increase flush free water to 30 cc q 2.   Will need to be cautious with fluid adjustment given her severe CM with EF 10%.   - creatinine is trending up 1.6 (1.39).   -Trend sodium     Elevated liver enzymes  -Likely from shock and low cardiac output.  Overall trend improving.  ALT has normalized AST continues to trend down     NICHO on CKD  -Creatinine improving.  Creatinine 1.38 today down from 1.64.  Did get some contrast load initially on admission likely contributed to rise in creatinine on 10/25  -He has component of chronic kidney disease from poorly controlled diabetes  -Avoid nephrotoxic agents     Fever  -Developed fever on 10/20.  This has resolved.  Low-grade temp with 24 hr Tmax 99.9   -She was started on 1023 Zosyn with plans by pulmonary to transition to Augmentin per tube   - 10/24/2024 respiratory culture showed scant amount of haemophilus influenza  -10/23 blood cultures no growth to date .  Noted 10/23 positive procalcitonin and leukocytosis worsening 10/22 which did improve on antibiotic therapy.     Positive UDS  -Positive for benzos and amphetamines  on admission  -Continue to monitor for withdrawal  -Currently she is nonparticipatory in her care so access referral likely not helpful at this point     Peripheral vascular disease  -Doppler pulses on the right.  Vascular was consulted early on in admission  -Vascular felt she likely had peripheral vascular disease and recommended continue supportive care but did not feel surgery was needed and planned to monitor closely.  -Feet remain warm, Doppler pulses on right.     Urinary retention  -Man catheter removed 10/25.  Patient is requiring In-N-Out cath for high residuals 10/26 but was able to void large amount 10/27 in brief.   -Continue to monitor bladder scans, low threshold to replace Man if high residuals become issue and I/O cath needed routinely to address.     Tachycardia  - noted HR up to 120's.  ST.  ? Related to pain.  RN just medicated with Tylenol.  Recommended bladder scan monitoring.  Tachycardia likely multifactorial.  Does appear she is getting somewhat on the dry side given her rise in sodium.  Will add additional free water which may help with her tachycardia.  Will need to be cautious with hydration efforts given her cardiomyopathy with an EF of 14%.  - WBC trend slightly up 12 (11) but improved from 17.9 on 10/23.  Low grade temp 99.9  - Chest x-ray  10/26 bilateral alveolar and interstitial infiltrates seen on prior imaging the 10/25  -She is on low-dose metoprolol and does have some room for up titration given blood pressures are trending in the 130s.  Will defer to cardiology who is following.    DVT prophylaxis  Lovenox 30 mg SC daily    Discharge  She remains full code and full support.  Her prognosis is guarded.  No family currently at bedside  Expected discharge date/ time has not been documented. TBD, Will need SNF.     Discussed with patient and nursing staff    Ramona Sams, BULMARO  [facility]

## 2024-10-27 NOTE — PROGRESS NOTES
"DOS: 10/27/2024  NAME: Albina Sosa   : 1965  PCP: Germaine James APRN  Chief Complaint   Patient presents with    Shortness of Breath    Chest Pain       Chief complaint: Left MCA stroke, recent STEMI, heart failure with reduced ejection fraction, meth abuse  Subjective: Patient seen and examined at the bedside this morning, no acute overnight event.    Objective:  Vital signs: BP (!) 90/30 (BP Location: Left leg, Patient Position: Lying)   Pulse 89   Temp 97.7 °F (36.5 °C) (Oral)   Resp 17   Ht 154.9 cm (61\")   Wt 49.1 kg (108 lb 3.9 oz)   SpO2 96%   BMI 20.45 kg/m²    No change on neuroexam compared to yesterday.  Gen: NAD, vitals reviewed  MS: Eyes spontaneously open but did not follow any commands, globally aphasic.  CN: Blink to threat on the right, blinks to threat on the left, pupils 2 mm reactive to light, VOR present, corneals present, right lower facial droop, unable to assess dysarthria.    Motor: Spontaneously moving the left side, slight withdrawal on the right upper and lower extremity, normal tone  Sensory: Unable to assess due to patient condition    Laboratory results:  Lab Results   Component Value Date    GLUCOSE 197 (H) 10/27/2024    CALCIUM 9.0 10/27/2024     (H) 10/27/2024    K 4.0 10/27/2024    CO2 29.0 10/27/2024     (H) 10/27/2024    BUN 53 (H) 10/27/2024    CREATININE 1.60 (H) 10/27/2024    EGFRIFAFRI 69 2022    EGFRIFNONA 60 2022    BCR 33.1 (H) 10/27/2024    ANIONGAP 11.0 10/27/2024     Lab Results   Component Value Date    WBC 12.28 (H) 10/27/2024    HGB 12.4 10/27/2024    HCT 38.9 10/27/2024    MCV 91.7 10/27/2024     (H) 10/27/2024     Lab Results   Component Value Date     (H) 10/23/2024    LDL  2023      Comment:        LDL cholesterol not calculated. Triglyceride levels  greater than 400 mg/dL invalidate calculated LDL results.    Reference range: <100    Desirable range <100 mg/dL for primary prevention;    <70 mg/dL " for patients with CHD or diabetic patients   with > or = 2 CHD risk factors.    LDL-C is now calculated using the Narciso-Ortiz   calculation, which is a validated novel method providing   better accuracy than the Friedewald equation in the   estimation of LDL-C.   Narciso SS et al. HIMA. 2013;310(19): 3208-2071   (http://education.iGistics.[employer] (formerly known as Local[employer])/faq/BNS062)    LDL  07/18/2023      Comment:        LDL cholesterol not calculated. Triglyceride levels  greater than 400 mg/dL invalidate calculated LDL results.    Reference range: <100    Desirable range <100 mg/dL for primary prevention;    <70 mg/dL for patients with CHD or diabetic patients   with > or = 2 CHD risk factors.    LDL-C is now calculated using the Narciso-Ortiz   calculation, which is a validated novel method providing   better accuracy than the Friedewald equation in the   estimation of LDL-C.   Narciso SS et al. HIMA. 2013;310(19): 0880-3181   (http://education.iGistics.[employer] (formerly known as Local[employer])/faq/CKM706)         Lab 10/23/24  0236   HEMOGLOBIN A1C 14.70*        Review of labs: Blood Glucose 206     Review and interpretation of imaging: No new brain imaging to review.     Workup:  -MRI brain:  1. Moderate to large acute left middle cerebral artery distribution  infarction with involvement of the left posterior left frontal lobe,  left parietal lobe, left occipital lobe. Suspect small foci of petechial  hemorrhage associated with the infarction without evidence for large  hemorrhage or shift of the midline structures.  2. Small chronic lacunar infarctions within the right corona radiata,  right gloria, posterior right caudate head.     CT head 24-hour post TNK which showed left MCA hypodensity with no acute hemorrhagic transformation.       Repeat CT head morning 10/24: Personally reviewed stable compared to prior exam from yesterday, no obvious hemorrhagic transformation.     CTA H/N: Could not complete initially due to patient agitation.     2D echo: EF 14%,  "borderline left atrial dilatation, no mention about LVT.     Carotid ultrasound showed non significant less than 50% stenosis of the ICAs bilaterally.     Stroke labs: A1c 14.7, , TSH normal     Diagnoses:  -Left MCA acute ischemic stroke cardioembolic status post TNK and mechanical thrombectomy  -Left M2 occlusion status post mechanical thrombectomy [id number]  -Significant hyperglycemia on presentation  -Uncontrolled type 2 diabetes  -Meth abuse  -Recent myocardial infarction status post stenting  -Cardiomyopathy  -Heart failure with reduced ejection fraction  -Tobacco abuse        Comment: Overall her exam is stable compared to yesterday.     Plan:  -Continue dual antiplatelet therapy giving her recent stenting.  -Goal LDL less than 70 currently at 150, Lipitor 80 mg daily  -Goal blood sugar less than 140, avoid hyperglycemia above 180 or hypoglycemia less than 100.    -Goal blood pressure less than 140 systolic, recommend strict blood pressure control to prevent hemorrhagic transformation.  -Lovenox for DVT prophylaxis  -Goal A1c less than 7 currently at 14.7, consult diabetic educator when appropriate  -Patient is critically ill with high risk of complications  - regarding tobacco abuse and meth abuse when appropriate.   -Consider Holter monitor on discharge.  -PT OT and speech    Discussed with daughter and son-in-law at the bedside and answered all their questions.    \"Dictated utilizing Dragon dictation\".     "

## 2024-10-27 NOTE — PLAN OF CARE
Problem: Adult Inpatient Plan of Care  Goal: Absence of Hospital-Acquired Illness or Injury  Intervention: Identify and Manage Fall Risk  Recent Flowsheet Documentation  Taken 10/27/2024 1800 by Maria Guadalupe Brown RN  Safety Promotion/Fall Prevention:   safety round/check completed   nonskid shoes/slippers when out of bed   lighting adjusted   fall prevention program maintained   clutter free environment maintained   assistive device/personal items within reach  Taken 10/27/2024 1600 by Maria Guadalupe Brown RN  Safety Promotion/Fall Prevention:   safety round/check completed   nonskid shoes/slippers when out of bed  Taken 10/27/2024 1400 by Maria Gaudalupe Brown RN  Safety Promotion/Fall Prevention:   safety round/check completed   nonskid shoes/slippers when out of bed   fall prevention program maintained   assistive device/personal items within reach  Taken 10/27/2024 1200 by Maria Guadalupe Brown RN  Safety Promotion/Fall Prevention:   safety round/check completed   nonskid shoes/slippers when out of bed  Taken 10/27/2024 1000 by Maria Guadalupe Brown RN  Safety Promotion/Fall Prevention:   safety round/check completed   nonskid shoes/slippers when out of bed  Taken 10/27/2024 0800 by Maria Guadalupe Brown RN  Safety Promotion/Fall Prevention:   safety round/check completed   nonskid shoes/slippers when out of bed   lighting adjusted   clutter free environment maintained   assistive device/personal items within reach  Intervention: Prevent Skin Injury  Recent Flowsheet Documentation  Taken 10/27/2024 1800 by Maria Guadalupe Brown RN  Body Position:   turned   left  Taken 10/27/2024 1600 by Maria Guadalupe Brown RN  Body Position:   side-lying   turned   right  Taken 10/27/2024 0800 by Maria Guadalupe Brown RN  Body Position:   supine   unstable with turn   position changed independently  Skin Protection:   incontinence pads utilized   skin sealant/moisture barrier applied   transparent dressing maintained  Intervention: Prevent and Manage VTE (Venous Thromboembolism) Risk  Recent  Flowsheet Documentation  Taken 10/27/2024 0800 by Maria Guadalupe Brown RN  VTE Prevention/Management:   bilateral   SCDs (sequential compression devices) on  Intervention: Prevent Infection  Recent Flowsheet Documentation  Taken 10/27/2024 1800 by Maria Guadalupe Brown RN  Infection Prevention: single patient room provided  Taken 10/27/2024 1000 by Maria Guadalupe Brown RN  Infection Prevention: single patient room provided  Taken 10/27/2024 0800 by Maria Guadalupe Brown RN  Infection Prevention: single patient room provided  Goal: Optimal Comfort and Wellbeing  Intervention: Provide Person-Centered Care  Recent Flowsheet Documentation  Taken 10/27/2024 0800 by Maria Guadalupe Brown RN  Trust Relationship/Rapport: care explained     Problem: Skin Injury Risk Increased  Goal: Skin Health and Integrity  Intervention: Optimize Skin Protection  Recent Flowsheet Documentation  Taken 10/27/2024 1800 by Maria Guadalupe Brown RN  Head of Bed (HOB) Positioning: HOB at 30 degrees  Taken 10/27/2024 1600 by Maria Guadalupe Brown RN  Activity Management: activity minimized  Head of Bed (HOB) Positioning: HOB lowered  Taken 10/27/2024 1000 by Maria Guadalupe Brown RN  Head of Bed (HOB) Positioning: HOB at 30 degrees  Taken 10/27/2024 0800 by Maria Guadalupe Brown RN  Activity Management: activity minimized  Pressure Reduction Techniques:   weight shift assistance provided   positioned off wounds   heels elevated off bed  Head of Bed (HOB) Positioning: HOB at 30 degrees  Pressure Reduction Devices:   pressure-redistributing mattress utilized   positioning supports utilized   foam padding utilized  Skin Protection:   incontinence pads utilized   skin sealant/moisture barrier applied   transparent dressing maintained     Problem: Restraint, Nonviolent  Goal: Absence of Harm or Injury  Intervention: Implement Least Restrictive Safety Strategies  Recent Flowsheet Documentation  Taken 10/27/2024 1800 by Maria Guadalupe Brown RN  Medical Device Protection:   IV pole/bag removed from visual field   tubing  secured  Diversional Activities: television  Taken 10/27/2024 1600 by Maria Guadalupe Brown RN  Medical Device Protection:   IV pole/bag removed from visual field   tubing secured  Diversional Activities: television  Taken 10/27/2024 1400 by Maria Guadalupe Brown RN  Medical Device Protection:   IV pole/bag removed from visual field   tubing secured  Diversional Activities: television  Taken 10/27/2024 1200 by Maria Guadalupe Brown RN  Medical Device Protection:   IV pole/bag removed from visual field   tubing secured  Diversional Activities: television  Taken 10/27/2024 1000 by Maria Guadalupe Brown RN  Medical Device Protection:   IV pole/bag removed from visual field   tubing secured  Diversional Activities: television  Taken 10/27/2024 0800 by Maria Guadalupe Brown RN  Medical Device Protection:   IV pole/bag removed from visual field   tubing secured  Diversional Activities: television  Intervention: Protect Dignity, Rights and Personal Wellbeing  Recent Flowsheet Documentation  Taken 10/27/2024 0800 by Maria Guadalupe Brown RN  Trust Relationship/Rapport: care explained  Intervention: Protect Skin and Joint Integrity  Recent Flowsheet Documentation  Taken 10/27/2024 1800 by Maria Guadalupe Brown RN  Body Position:   turned   left  Taken 10/27/2024 1600 by Maria Guadalupe Brown RN  Body Position:   side-lying   turned   right  Taken 10/27/2024 0800 by Maria Guadalupe Brown RN  Body Position:   supine   unstable with turn   position changed independently  Skin Protection:   incontinence pads utilized   skin sealant/moisture barrier applied   transparent dressing maintained     Problem: Mechanical Ventilation Invasive  Goal: Effective Communication  Intervention: Ensure Effective Communication  Recent Flowsheet Documentation  Taken 10/27/2024 1800 by Maria Guadalupe Brown RN  Diversional Activities: television  Taken 10/27/2024 1600 by Maria Guadalupe Brown RN  Diversional Activities: television  Taken 10/27/2024 1400 by Maria Guadalupe Brown RN  Diversional Activities: television  Taken 10/27/2024 1200 by Maria Guadalupe Brown  RN  Diversional Activities: television  Taken 10/27/2024 1000 by Maria Guadalupe Brown RN  Diversional Activities: television  Taken 10/27/2024 0800 by Maria Guadalupe Brown RN  Trust Relationship/Rapport: care explained  Diversional Activities: television  Goal: Optimal Device Function  Intervention: Optimize Device Care and Function  Recent Flowsheet Documentation  Taken 10/27/2024 1800 by Maria Guadalupe Brown RN  Airway Safety Measures:   suction at bedside   oxygen flowmeter at bedside  Taken 10/27/2024 1000 by Maria Guadalupe Brown RN  Airway Safety Measures:   suction at bedside   oxygen flowmeter at bedside  Taken 10/27/2024 0800 by Maria Guadalupe Brown RN  Airway Safety Measures:   suction at bedside   oxygen flowmeter at bedside  Goal: Mechanical Ventilation Liberation  Intervention: Promote Extubation and Mechanical Ventilation Liberation  Recent Flowsheet Documentation  Taken 10/27/2024 0800 by Maria Guadalupe Brown RN  Environmental Support: calm environment promoted  Medication Review/Management: medications reviewed  Goal: Optimal Nutrition Delivery  Intervention: Optimize Nutrition Delivery  Recent Flowsheet Documentation  Taken 10/27/2024 0800 by Maria Guadalupe Brown RN  Nutrition Support Management: (cont tube feeding) other (see comments)  Goal: Absence of Device-Related Skin and Tissue Injury  Intervention: Maintain Skin and Tissue Health  Recent Flowsheet Documentation  Taken 10/27/2024 0800 by Maria Guadalupe Brown RN  Device Skin Pressure Protection:   adhesive use limited   positioning supports utilized   pressure points protected   tubing/devices free from skin contact  Goal: Absence of Ventilator-Induced Lung Injury  Intervention: Prevent Ventilator-Associated Pneumonia  Recent Flowsheet Documentation  Taken 10/27/2024 1800 by Maria Guadalupe Brown RN  Head of Bed (HOB) Positioning: HOB at 30 degrees  Taken 10/27/2024 1600 by Maria Guadalupe Brown RN  Head of Bed (HOB) Positioning: HOB lowered  Taken 10/27/2024 1000 by Maria Guadalupe Brown RN  Head of Bed (HOB) Positioning: HOB at 30  degrees  Taken 10/27/2024 0800 by Maria Guadalupe Brown RN  Head of Bed ([facility]) Positioning: HOB at 30 degrees     Problem: Fall Injury Risk  Goal: Absence of Fall and Fall-Related Injury  Intervention: Identify and Manage Contributors  Recent Flowsheet Documentation  Taken 10/27/2024 0800 by Maria Guadalupe Brown RN  Medication Review/Management: medications reviewed  Intervention: Promote Injury-Free Environment  Recent Flowsheet Documentation  Taken 10/27/2024 1800 by Maria Guadalupe Brown RN  Safety Promotion/Fall Prevention:   safety round/check completed   nonskid shoes/slippers when out of bed   lighting adjusted   fall prevention program maintained   clutter free environment maintained   assistive device/personal items within reach  Taken 10/27/2024 1600 by Maria Guadalupe Brown RN  Safety Promotion/Fall Prevention:   safety round/check completed   nonskid shoes/slippers when out of bed  Taken 10/27/2024 1400 by Maria Guadalupe Brown RN  Safety Promotion/Fall Prevention:   safety round/check completed   nonskid shoes/slippers when out of bed   fall prevention program maintained   assistive device/personal items within reach  Taken 10/27/2024 1200 by Maria Guadalupe Brown RN  Safety Promotion/Fall Prevention:   safety round/check completed   nonskid shoes/slippers when out of bed  Taken 10/27/2024 1000 by Maria Guadalupe Brown RN  Safety Promotion/Fall Prevention:   safety round/check completed   nonskid shoes/slippers when out of bed  Taken 10/27/2024 0800 by Maria Guadalupe Brown RN  Safety Promotion/Fall Prevention:   safety round/check completed   nonskid shoes/slippers when out of bed   lighting adjusted   clutter free environment maintained   assistive device/personal items within reach     Problem: Enteral Nutrition  Goal: Absence of Aspiration Signs and Symptoms  Intervention: Minimize Aspiration Risk  Recent Flowsheet Documentation  Taken 10/27/2024 1800 by Maria Guadalupe Brown RN  Head of Bed (HOB) Positioning: HOB at 30 degrees  Taken 10/27/2024 1600 by Maria Guadalupe Brown RN  Head of  Bed (HOB) Positioning: HOB lowered  Taken 10/27/2024 1000 by Maria Guadalupe Brown RN  Head of Bed (HOB) Positioning: HOB at 30 degrees  Taken 10/27/2024 0800 by Maria Guadalupe Brown RN  Head of Bed (HOB) Positioning: HOB at 30 degrees  Enteral Feeding Safety:   placement checked   tubing labeled as enteral feeding   tube marked at exit site  Goal: Safe, Effective Therapy Delivery  Intervention: Prevent Feeding-Related Adverse Events  Recent Flowsheet Documentation  Taken 10/27/2024 0800 by Maria Guadalupe Brown RN  Enteral Feeding Safety:   placement checked   tubing labeled as enteral feeding   tube marked at exit site  Goal: Feeding Tolerance  Intervention: Prevent and Manage Feeding Intolerance  Recent Flowsheet Documentation  Taken 10/27/2024 0800 by Maria Guadalupe Brown RN  Nutrition Support Management: (cont tube feeding) other (see comments)   Goal Outcome Evaluation:   No acute changes noted this shift, pt is aphasic, NIH-25 noted, NG tube intact, tube feeding running as directed, pt tolerated well. IV meds admin as directed, no s/s of adverse reaction noted this shift. VSS, HOB remained elevated throughout shift. No s/s of pain or distress noted. Call light and personal items in reach.

## 2024-10-27 NOTE — PLAN OF CARE
Goal Outcome Evaluation:           Progress: no change   Patient was non-verbal all through the shift, she was carried along with her plan of care. Tube feeding, restraint and oxygen therapy ongoing. She was restless but calmed down briefly after reorientation. Patient daughter, Ara called tonight and was concerned on her mum inability to move her extremities. She stated she will be coming over in the morning to check on her mum. No new complaint this moment.

## 2024-10-27 NOTE — PROGRESS NOTES
"Patient Care Team:  Germaine James APRN as PCP - General (Nurse Practitioner)    Chief Complaint: STEMI, CVA, ischemic cardiomyopathy    Interval History:   Currently on 5 L nasal cannula.  No change from a neurostandpoint    Objective   Vital Signs  Temp:  [97.7 °F (36.5 °C)-99.9 °F (37.7 °C)] 97.7 °F (36.5 °C)  Heart Rate:  [] 89  Resp:  [16-20] 17  BP: ()/() 90/30    Intake/Output Summary (Last 24 hours) at 10/27/2024 1344  Last data filed at 10/27/2024 0836  Gross per 24 hour   Intake 598 ml   Output --   Net 598 ml     Flowsheet Rows      Flowsheet Row First Filed Value   Admission Height 154.9 cm (61\") Documented at 10/22/2024 1700   Admission Weight 54 kg (119 lb 0.8 oz) Documented at 10/22/2024 1214            Temp:  [97.7 °F (36.5 °C)-99.9 °F (37.7 °C)] 97.7 °F (36.5 °C)  Heart Rate:  [] 89  Resp:  [16-20] 17  BP: ()/() 90/30    Intake/Output Summary (Last 24 hours) at 10/27/2024 1344  Last data filed at 10/27/2024 0836  Gross per 24 hour   Intake 598 ml   Output --   Net 598 ml     Flowsheet Rows      Flowsheet Row First Filed Value   Admission Height 154.9 cm (61\") Documented at 10/22/2024 1700   Admission Weight 54 kg (119 lb 0.8 oz) Documented at 10/22/2024 1214            General Appearance:  Somnolent.  Eyes open but does not follow commands.   Head:    Normocephalic, without obvious abnormality, atraumatic       Neck/Lymph   No adenopathy, supple, no thyromegaly, no carotid bruit, no    JVD   Lungs:     Clear to auscultation bilaterally, no wheezes, rales, or     rhonchi    Cardiac:    Normal rate, regular rhythm, no murmur, no rub, no gallop   Chest Wall:    No abnormalities observed   GI:     Normal bowel sounds, soft, nontender, nondistended,            no rebound tenderness   Extremities:   No cyanosis, clubbing, or edema   Circulatory/Peripheral Vascular :   Pulses palpable and equal bilaterally   Integumentary:   No bleeding or rash. Normal temperature "                amoxicillin-clavulanate, 500 mg, Oral, Q12H  aspirin, 81 mg, Nasogastric, Daily  atorvastatin, 40 mg, Oral, Nightly  chlorhexidine, 15 mL, Mouth/Throat, Q12H  clopidogrel, 75 mg, Nasogastric, Daily  enoxaparin, 30 mg, Subcutaneous, Nightly  famotidine, 20 mg, Nasogastric, Daily  insulin glargine, 30 Units, Subcutaneous, Q12H  insulin regular, 4-24 Units, Subcutaneous, Q4H  ipratropium-albuterol, 3 mL, Nebulization, 4x Daily - RT  metoprolol tartrate, 12.5 mg, Oral, Q12H  sodium phosphate, 15 mmol, Intravenous, Once  venlafaxine, 37.5 mg, Nasogastric, BID             Results Review:    Results from last 7 days   Lab Units 10/27/24  0742   SODIUM mmol/L 150*   POTASSIUM mmol/L 4.0   CHLORIDE mmol/L 110*   CO2 mmol/L 29.0   BUN mg/dL 53*   CREATININE mg/dL 1.60*   GLUCOSE mg/dL 197*   CALCIUM mg/dL 9.0     Results from last 7 days   Lab Units 10/22/24  1646 10/22/24  1150   HSTROP T ng/L >10,000* 3,432*     Results from last 7 days   Lab Units 10/27/24  0527   WBC 10*3/mm3 12.28*   HEMOGLOBIN g/dL 12.4   HEMATOCRIT % 38.9   PLATELETS 10*3/mm3 486*     Results from last 7 days   Lab Units 10/23/24  0843 10/22/24  1722 10/22/24  1410 10/22/24  1150   INR   --   --   --  1.02   APTT seconds 23.6 49.8* 133.1* 22.4*     Results from last 7 days   Lab Units 10/23/24  0236   CHOLESTEROL mg/dL 245*         Results from last 7 days   Lab Units 10/23/24  0236   CHOLESTEROL mg/dL 245*   TRIGLYCERIDES mg/dL 311*   HDL CHOL mg/dL 37*   LDL CHOL mg/dL 150*     @LABRCNT(bnp)@  I reviewed the patient's new clinical results.  I personally viewed and interpreted the patient's EKG/Telemetry data          Assessment & Plan   59 y.o. woman with past medical history notable for coronary disease status post LIMA to LAD percutaneous interventions to her circumflex and left-sided PDA, hypertension, mixed hyperlipidemia, diabetes type 2 on insulin therapy, history of stroke, history of methamphetamine use, and ongoing tobacco  abuse with underlying lung disease who presented to the emergency room with a day or 2 of acute onset chest discomfort on 10/22 was noted to have ST changes concerning for STEMI.  She was taken emergently to the Cath Lab where she was found to have multivessel disease some chronic and on her mid marginal branch which fit with her EKG changes was felt to be the acute lesion and behaved so.  This was revascularized and stent placed.  She was also noted to have occlusion of her left dominant circumflex however this behave more chronic given that there was already collateralization and wire would not pass.  She was also found to have severe cardiomyopathy which is new compared to at least stress findings from 2 years ago.       Unfortunately patient had a stroke symptoms at the end of the case she was taken emergently had TNK and subsequently taken for thrombectomy despite that still had a fairly sizable stroke on the left side.       STEMI:  Mid marginal branch felt to be culprit however patient with severe chronic disease throughout the LAD and circumflex and left-sided PDA  Status post PCI 10/22  Would like to continue aspirin and Plavix   Follow-up echocardiogram does not show clear evidence of left ventricular thrombus severely decreased ejection     Stroke:  Likely cardioembolic unclear if from left ventricular thrombus or catheter  Status post TNK and thrombectomy  Neurology following  Now on aspirin and clopidogrel     Acute systolic congestive heart failure:  Severely elevated LVEDP of 40 mmHg in the Cath Lab  In the setting of her current respiratory status and worsening renal function along with electrolyte abnormalities and ejection fraction is less than 20% and started fixed dose of milrinone at 0.25mcg/kg/min.      Diabetes type 2: Hemoglobin A1c 14.7  Elevated blood sugars noted but no evidence of acidosis think it is more of a hyperglycemia    Hypernatremia/Acute kidney injury   Renal consultation  currently pending      -Some conversations with the family about goals of care.  Right now she is a full code.  I do not foresee significant improvement unfortunately

## 2024-10-27 NOTE — PROGRESS NOTES
"      [facility]         Dr Ngo Sayied   LOS: 5 days   Patient Care Team:  Germaine James APRN as PCP - General (Nurse Practitioner)    Chief Complaint: Acute left MCA stroke with acute ST MI cardiomyopathy acute hypoxemic respiratory failure COPD multiple issues as listed below    Interval History: Events noted chart reviewed.  Patient is nonverbal spontaneously moves her left upper and lower extremity.  Currently on 5 L high flow oxygen.    REVIEW OF SYSTEMS:   Unable to get with patient's current condition    Ventilator/Non-Invasive Ventilation Settings (From admission, onward)     Flow oxygen 5 L        Vital Signs  Temp:  [98.1 °F (36.7 °C)-99.9 °F (37.7 °C)] 98.4 °F (36.9 °C)  Heart Rate:  [100-125] 108  Resp:  [16-20] 20  BP: (109-141)/() 138/98    Intake/Output Summary (Last 24 hours) at 10/27/2024 1256  Last data filed at 10/27/2024 0836  Gross per 24 hour   Intake 598 ml   Output --   Net 598 ml     Flowsheet Rows      Flowsheet Row First Filed Value   Admission Height 154.9 cm (61\") Documented at 10/22/2024 1700   Admission Weight 54 kg (119 lb 0.8 oz) Documented at 10/22/2024 1214            Physical Exam:  Patient is examined using the personal protective equipment as per guidelines from infection control for this particular patient as enacted.  Hand hygiene was performed before and after patient interaction.   General Appearance:  Appears to be chronically ill with right hemiparesis moves left upper and lower extremity.  Nonverbal.  ENT normocephalic atraumatic  Neck midline trachea, no thyromegaly   Lungs:   Diminished breath sounds with crackles right greater than left    Heart:    Regular rhythm and normal rate, normal S1 and S2, no            murmur, no gallop, no rub, no click   Chest Wall:    No abnormalities observed   Abdomen:     Normal bowel sounds, no masses, no organomegaly, soft        nontender, nondistended, no guarding, no rebound                tenderness "   Extremities:   Moves all extremities well, no edema, no cyanosis, no             redness  CNS right hemiparesis  Skin no rashes no nodules  Musculoskeletal no cyanosis no clubbing normal range of motion     Results Review:        Results from last 7 days   Lab Units 10/27/24  0742 10/26/24  2015 10/26/24  1025 10/26/24  0307   SODIUM mmol/L 150* 147*  --  148*   POTASSIUM mmol/L 4.0 4.0 3.7 3.3*   CHLORIDE mmol/L 110* 109*  --  110*   CO2 mmol/L 29.0 28.2  --  26.3   BUN mg/dL 53* 38*  --  34*   CREATININE mg/dL 1.60* 1.39*  --  1.38*   GLUCOSE mg/dL 197* 184*  --  168*   CALCIUM mg/dL 9.0 9.2  --  8.8     Results from last 7 days   Lab Units 10/22/24  1646 10/22/24  1150   HSTROP T ng/L >10,000* 3,432*     Results from last 7 days   Lab Units 10/27/24  0527 10/26/24  0307 10/25/24  0404   WBC 10*3/mm3 12.28* 11.13* 10.98*   HEMOGLOBIN g/dL 12.4 12.4 11.1*   HEMATOCRIT % 38.9 36.6 34.0   PLATELETS 10*3/mm3 486* 359 316     Results from last 7 days   Lab Units 10/23/24  0843 10/22/24  1722 10/22/24  1410 10/22/24  1150   INR   --   --   --  1.02   APTT seconds 23.6 49.8* 133.1* 22.4*     Results from last 7 days   Lab Units 10/23/24  0236   CHOLESTEROL mg/dL 245*         Results from last 7 days   Lab Units 10/23/24  0236   CHOLESTEROL mg/dL 245*   TRIGLYCERIDES mg/dL 311*   HDL CHOL mg/dL 37*   LDL CHOL mg/dL 150*     Results from last 7 days   Lab Units 10/25/24  0407   PH, ARTERIAL pH units 7.463*   PO2 ART mm Hg 66.0*   PCO2, ARTERIAL mm Hg 33.2*   HCO3 ART mmol/L 23.8       I reviewed the patient's new clinical results.  I personally viewed and interpreted the patient's chest x-ray.        Medication Review:   amoxicillin-clavulanate, 500 mg, Oral, Q12H  aspirin, 81 mg, Nasogastric, Daily  atorvastatin, 40 mg, Oral, Nightly  chlorhexidine, 15 mL, Mouth/Throat, Q12H  clopidogrel, 75 mg, Nasogastric, Daily  enoxaparin, 30 mg, Subcutaneous, Nightly  famotidine, 20 mg, Nasogastric, Daily  insulin glargine, 30  Units, Subcutaneous, Q12H  insulin regular, 4-24 Units, Subcutaneous, Q4H  ipratropium-albuterol, 3 mL, Nebulization, 4x Daily - RT  metoprolol tartrate, 12.5 mg, Oral, Q12H  sodium phosphate, 15 mmol, Intravenous, Once  venlafaxine, 37.5 mg, Nasogastric, BID             ASSESSMENT:   Acute left MCA ischemic stroke, status post TNK and thrombectomy  Acute STEMI status post primary PCI  Acute respiratory failure requiring invasive mechanical ventilation  CAD with history CABG and prior stents  Ischemic cardiomyopathy with EF 10%  Uncontrolled hyperglycemia with blood sugar very high  Type 2 diabetes mellitus  COPD  Current cigarette smoker  Obesity  Hyponatremia  Elevated liver enzymes  NICHO  Fever  Positive UDS  Peripheral vascular disease  Acute renal failure    PLAN:  Events noted chart reviewed  Hospitalist following on the floor we will only address pulmonary issue  Worsening creatinine with increased oxygen requirement I suspect likely from volume overload.  Consult nephrology for fluid and electrolyte management.  Watch for aspiration  Worsening leukocytosis I suspect this is more reactive.  I will check a procalcitonin.  She is currently on oral antibiotics to be continued  Tube feeds to continue  Multiple medical issues hospitalist managing  Overall prognosis guarded          Jeni Toure MD  10/27/24  12:56 EDT

## 2024-10-28 NOTE — PROGRESS NOTES
[facility] Progress Note       Encounter Date:10/28/24  Patient:Albina Sosa  :1965  MRN:[id number]      Chief Complaint: Follow-up STEMI      Subjective:        Patient nonverbal, moving left legs      Review of Systems:  Review of Systems   Unable to perform ROS: Patient nonverbal       Medications:  Scheduled Meds:  aspirin, 81 mg, Nasogastric, Daily  atorvastatin, 40 mg, Oral, Nightly  chlorhexidine, 15 mL, Mouth/Throat, Q12H  clopidogrel, 75 mg, Nasogastric, Daily  enoxaparin, 30 mg, Subcutaneous, Nightly  famotidine, 20 mg, Nasogastric, Daily  insulin glargine, 30 Units, Subcutaneous, Q12H  insulin regular, 4-24 Units, Subcutaneous, Q4H  metoprolol tartrate, 12.5 mg, Oral, Q12H  venlafaxine, 37.5 mg, Nasogastric, BID    Continuous Infusions:  milrinone, 0.25 mcg/kg/min, Last Rate: 0.25 mcg/kg/min (10/27/24 1522)    PRN Meds:    acetaminophen    Calcium Replacement - Follow Nurse / BPA Driven Protocol    dextrose    dextrose    glucagon (human recombinant)    hydrALAZINE    HYDROcodone-acetaminophen    ipratropium-albuterol    ipratropium-albuterol    loperamide    Magnesium Standard Dose Replacement - Follow Nurse / BPA Driven Protocol    nitroglycerin    OLANZapine    ondansetron ODT **OR** ondansetron    Phosphorus Replacement - Follow Nurse / BPA Driven Protocol    Potassium Replacement - Follow Nurse / BPA Driven Protocol    Potassium Replacement - Follow Nurse / BPA Driven Protocol    [COMPLETED] Insert Peripheral IV **AND** sodium chloride         Objective:       Vitals:    10/28/24 0640 10/28/24 0728 10/28/24 0934 10/28/24 1154   BP:  103/75 105/84 123/78   BP Location:  Left arm  Left arm   Patient Position:  Lying  Lying   Pulse:  99 107 107   Resp:  16  16   Temp:  99.5 °F (37.5 °C)  98.4 °F (36.9 °C)   TempSrc:  Oral  Oral   SpO2:  91%  92%   Weight: 49.3 kg (108 lb 11 oz)      Height:               Physical Exam:  Constitutional: Chronically ill-appearing, frail, no  acute distress   HENT: Oropharynx clear and membrane moist, NG in place  Eyes: Normal conjunctiva, no sclera icterus.  Neck: Supple, no carotid bruit bilaterally.  Cardiovascular: Regular and Tachycardia rate and rhythm, No Murmur, No bilateral lower extremity edema.  Pulmonary: Normal respiratory effort, coarse lung sounds, no wheezing.  Abdominal: Soft, nontender, no hepatosplenomegaly, liver is non-pulsatile.  Neurological: Still fairly sleepy not following commands well  Skin: Warm, dry, no ecchymosis, no rash.  Psych: Unable to assess.           Lab Review:   Results from last 7 days   Lab Units 10/28/24  0402 10/27/24  0742 10/26/24  2015 10/26/24  1025 10/26/24  0307 10/25/24  0404 10/24/24  1502 10/24/24  0333 10/23/24  2258 10/23/24  0236 10/22/24  2253 10/22/24  1939 10/22/24  1150   SODIUM mmol/L 152* 150* 147*  --  148* 144  --  136  --  139  --  129* 125*   POTASSIUM mmol/L 3.9 4.0 4.0 3.7 3.3* 4.0 4.9 3.6   < > 3.5  --  4.3 5.1   CHLORIDE mmol/L 113* 110* 109*  --  110* 111*  --  100  --  101  --  90* 86*   CO2 mmol/L 28.1 29.0 28.2  --  26.3 23.7  --  24.8  --  25.4  --  23.0 24.3   BUN mg/dL 50* 53* 38*  --  34* 34*  --  30*  --  21*  --  22* 22*   CREATININE mg/dL 1.36* 1.60* 1.39*  --  1.38* 1.64*  --  1.63*  --  1.54*  --  1.47* 1.35*   GLUCOSE mg/dL 131* 197* 184*  --  168* 166*  --  178*  --  169*   < > 699* 803*   CALCIUM mg/dL 8.5* 9.0 9.2  --  8.8 8.4*  --  9.4  --  11.0*  --  11.3* 10.2   AST (SGOT) U/L 62*  --  51*  --   --   --   --  58*  --   --   --  138* 80*   ALT (SGPT) U/L 34*  --  38*  --   --   --   --  24  --   --   --  39* 41*    < > = values in this interval not displayed.     Results from last 7 days   Lab Units 10/22/24  1646 10/22/24  1150   HSTROP T ng/L >10,000* 3,432*     Results from last 7 days   Lab Units 10/28/24  0402 10/27/24  0527 10/26/24  0307 10/25/24  0404 10/24/24  0333 10/23/24  0236 10/22/24  1646   WBC 10*3/mm3 14.30* 12.28* 11.13* 10.98* 15.34* 14.22* 17.94*    HEMOGLOBIN g/dL 11.4* 12.4 12.4 11.1* 12.2 14.8 15.2   HEMATOCRIT % 34.3 38.9 36.6 34.0 36.5 43.0 47.2*   PLATELETS 10*3/mm3 395 486* 359 316 304 397 361     Results from last 7 days   Lab Units 10/23/24  0843 10/22/24  1722 10/22/24  1410 10/22/24  1150   INR   --   --   --  1.02   APTT seconds 23.6 49.8* 133.1* 22.4*         Results from last 7 days   Lab Units 10/23/24  0236   CHOLESTEROL mg/dL 245*   TRIGLYCERIDES mg/dL 311*   HDL CHOL mg/dL 37*     Results from last 7 days   Lab Units 10/22/24  1150   PROBNP pg/mL 26,236.0*     Results from last 7 days   Lab Units 10/26/24  1025   TSH uIU/mL 0.349       Echocardiogram 10/23/2024:  Left ventricular systolic function is severely decreased. Calculated left ventricular EF = 14.2%, visually estimated LVEF 20-25%.  There is global hypokinesis.  Additionally, the following left ventricular wall segments are akinetic: basal anterolateral, mid anterolateral, apical lateral, basal inferolateral, mid inferolateral and apex.  Left ventricular wall thickness is consistent with moderate concentric hypertrophy.  Left ventricular mass index is increased.  May consider infiltrative cardiomyopathy in the appropriate clinical setting.  No obvious LV thrombus noted including on echo contrast images.  Mild-moderate mitral regurgitation.  Borderline left atrial enlargement, normal RA and IVC size.  There is a small (<1cm) pericardial effusion. There is no evidence of cardiac tamponade.    Cardiac Catheterization 10/22/2024:  Severe multivessel coronary artery disease with 100% ostial LAD, circumflex contains 100% stenosis of the mid marginal branch at the distal stent edge likely culprit for STEMI presentation, left-sided PDA from the circumflex has 100% proximal segment stenoses with left to left collaterals from a LIMA to LAD supplying the distal PDA, patent LIMA to LAD LAD is diffusely diseased small in caliber size with sequential 80% stenoses.  LVEDP of approximately 30  mmHg  Ejection fraction 10%  Successful PCI to mid marginal with placement of a 2.25 x 12 mm Xience drug-eluting stent deployed initially at 8 keon with her stent balloon back slightly postdilated to 16 at  [place] to right femoral artery  Patient developed acute neurologic changes at the end of the case when we went to load her with aspirin and Plavix.  She was noted to be fairly unresponsive even after reversal with Narcan and flumazenil code stroke was called she was assessed emergently and taken off her head CT       Assessment:          Diagnosis Plan   1. Acute ST elevation myocardial infarction (STEMI), unspecified artery  Ambulatory Referral to Cardiac Rehab      2. Smoker        3. H/O medication noncompliance        4. Acute pulmonary edema               Plan:       Ms. Sosa is a 59 y.o. woman with past medical history notable for coronary disease status post LIMA to LAD percutaneous interventions to her circumflex and left-sided PDA, hypertension, mixed hyperlipidemia, diabetes type 2 on insulin therapy, history of stroke, history of methamphetamine use, and ongoing tobacco abuse with underlying lung disease who presented to the emergency room with a day or 2 of acute onset chest discomfort on 10/22 was noted to have ST changes concerning for STEMI.  She was taken emergently to the Cath Lab where she was found to have multivessel disease some chronic and on her mid marginal branch which fit with her EKG changes was felt to be the acute lesion and behaved so.  This was revascularized and stent placed.  She was also noted to have occlusion of her left dominant circumflex however this behave more chronic given that there was already collateralization and wire would not pass.  She was also found to have severe cardiomyopathy which is new compared to at least stress findings from 2 years ago.  Unfortunately patient had a stroke symptoms at the end of the case she was taken emergently had TNK and subsequently  taken for thrombectomy despite that still had a fairly sizable stroke on the left side.  Patient was intubated for approximately 5 days after her infarct and stroke.  Unfortunately neurostatus has not improved much after extubation and still remains extremely sick from multiple fronts including her new cardiomyopathy and severe stroke.  Milrinone was started on 10/27 due to some concerns over her renal function being slightly worse it has been somewhat stable she is dealing with hyponatremia now which hopefully will improve with free water flushes.  Follow-up head CT demonstrates new ischemic stroke in the cerebellar region which is obviously concerning given the concern that this might have been a cardioembolic stroke potentially from a left ventricular thrombus in the left ventricle given her severely reduced ejection fraction which seems out of proportion to her infarct of the distal mid marginal branch.  Talked with .  Have had a few attempts to call the daughter as well to keep them updated but we still remain in very challenging situation with an overall very poor prognosis not only from a cardiac but also from a neurostandpoint.  Will try again to reach out to the daughter as I have not been able to discuss much with her I will reach out to  again today..      STEMI:  Mid marginal branch felt to be culprit however patient with severe chronic disease throughout the LAD and circumflex and left-sided PDA  Status post PCI 10/22  Was loaded with aspirin and clopidogrel in the Cath Lab 10/2022  Continue aspirin  Adding on low-dose metoprolol 12.5 mg we will have to use tartrate given that is going through a core track but can titrate up as tolerated  Would like to continue aspirin and Plavix   Follow-up echocardiogram does not show clear evidence of left ventricular thrombus but given subsequent stroke will get limited echo     Stroke:  Likely cardioembolic unclear if from left ventricular thrombus or  clot on the catheter  Status post TNK and thrombectomy  Neurology following  Now on aspirin and clopidogrel    Acute systolic congestive heart failure:  Severely elevated LVEDP of 40 mmHg in the Cath Lab  Did receive diuretic her chest x-ray is looking better  Renal function elevated but stable  Milrinone 0.25 mcg started 10/27 given elevated BUN and Cr seems to be tolerating but will follow up on limited echo     Diabetes type 2:   Elevated blood sugars noted but no evidence of acidosis think it is more of a hyperglycemia  Appreciate critical care assistance with managing her severe hyperglycemia     Acute hyponatremia:  Has improved and now hypernatremic treating with free water flushes appreciate renal insight             Bret Lawrence MD  [facility]  10/28/24  12:59 EDT

## 2024-10-28 NOTE — SIGNIFICANT NOTE
10/28/24 1458   OTHER   Discipline occupational therapist   Rehab Time/Intention   Session Not Performed other (see comments)  (Spoke with RN.  Patient has pallitive consult and not appropriate for OT today.  Will follow-up tomorrow.)   Therapy Assessment/Plan (PT)   Criteria for Skilled Interventions Met (PT) yes   Recommendation   OT - Next Appointment 10/29/24

## 2024-10-28 NOTE — NURSING NOTE
10/28/24 1101   Wound 10/24/24 1600 Bilateral sacral spine   Placement Date/Time: 10/24/24 1600   Side: Bilateral  Location: sacral spine  Primary Wound Type: Pressure Injury   Pressure Injury Stage DTPI   Dressing Appearance dry;intact   Base maroon/purple   Periwound intact;pink   Edges irregular   Wound Length (cm) 2 cm   Wound Width (cm) 2 cm   Wound Surface Area (cm^2) 4 cm^2   Drainage Amount none     WOCN: DTI coccyx. Patient needs assist of 2 to reposition. Pro Plus mattress ordered. Will continue to follow prn

## 2024-10-28 NOTE — PLAN OF CARE
Problem: Restraint, Nonviolent  Goal: Absence of Harm or Injury  10/28/2024 0552 by Deja Ha RN  Outcome: Not Progressing  10/28/2024 0550 by Deja Ha RN  Outcome: Not Progressing  Intervention: Implement Least Restrictive Safety Strategies  Flowsheets  Taken 10/28/2024 0552  Medical Device Protection:   IV pole/bag removed from visual field   torso covered   tubing secured  Diversional Activities: television  Taken 10/28/2024 0400  Medical Device Protection:   IV pole/bag removed from visual field   torso covered   tubing secured  Diversional Activities: television  Taken 10/28/2024 0200  Medical Device Protection:   IV pole/bag removed from visual field   torso covered   tubing secured  Diversional Activities: television  Taken 10/28/2024 0000  Medical Device Protection:   IV pole/bag removed from visual field   torso covered   tubing secured  Diversional Activities: television  Taken 10/27/2024 2201  Medical Device Protection:   IV pole/bag removed from visual field   torso covered   tubing secured  Diversional Activities: television  Taken 10/27/2024 2200  Medical Device Protection:   IV pole/bag removed from visual field   torso covered   tubing secured  Diversional Activities: television  Taken 10/27/2024 2000  Medical Device Protection:   IV pole/bag removed from visual field   torso covered   tubing secured  Diversional Activities: television  Intervention: Protect Dignity, Rights and Personal Wellbeing  Flowsheets (Taken 10/28/2024 0552)  Trust Relationship/Rapport:   reassurance provided   care explained  Intervention: Protect Skin and Joint Integrity  Recent Flowsheet Documentation  Taken 10/28/2024 0409 by Deja Ha RN  Body Position: position changed independently  Taken 10/28/2024 0218 by Deja Ha RN  Body Position:   weight shifting   position changed independently  Taken 10/28/2024 0017 by Deja Ha RN  Body Position:   weight shifting   supine   legs  elevated  Taken 10/27/2024 2238 by Djea Ha RN  Body Position: position changed independently  Taken 10/27/2024 2130 by Deja Ha, RN  Body Position: position changed independently  Taken 10/27/2024 1935 by Deja Ha, RN  Body Position: position changed independently

## 2024-10-28 NOTE — PLAN OF CARE
Goal Outcome Evaluation:  Plan of Care Reviewed With: patient        Progress: no change  Outcome Evaluation: 2-4L NC. SR/ST. Primacor gtt per order. NPO; NG tube in place. Tube feeds per order. Incontinent b/b. [facility] 24 this shift. Restraint per order. No s/s pain. Bilat groin site c/d/i.                              Statement Selected

## 2024-10-28 NOTE — PROGRESS NOTES
Nutrition Services    Patient Name:  Albina Sosa  YOB: 1965  MRN: [id number]  Admit Date:  10/22/2024  Assessment Date:  10/28/24    CLINICAL NUTRITION    Comments: Follow up for tube feeding    Current TF's: Diabetisource AC at 55ml/hr FWF 50ml/hr   On 2L nasal cannula. Confused and nonverbal. Not following commands. Palliative consulted. Noted hypernatremia. Pt with about 10# weight loss since admit. Will increase TF rate.   Labs: Na 152, K 3.9, BUN 50, Cr 1.36, gluc 131-162, Mg 1.9, PO4 3.6  Meds: lovenox, pepcid, insulin     Initial Goal:  *initial goal conservative d/t risk of RFS     Diabetisource AC at 55mL/hr + 100mL/hr water flush      End Goal:    Diabetisource AC at 60 mL/hr + 100mL/hr water flush      Calories  1584 kcals (%)    Protein  79 g (%)    Free water  1082 mL   Flushes  2400ml     The above end goal rate is for 22 hrs/day to assume interruptions for ADLs. Water flushes adjusted based on clinical picture + Rx flushes/IV fluids     Specialized formula chosen r/t DM2.     PLAN  - increase TF diabetisource AC @60ml/hr   - FWF 100ml/hr per renal   - monitor labs and replace electrolytes per protocol     RD to continue to monitor per protocol.     Encounter Information         Reason For Encounter Follow up for TF    Current Issues STEMI involving left circumflex coronary artery, acute ischemic left middle cerebral artery stroke. Non verbal, not following commands.      Estimated Requirements         Calories  9512-1908 (25 kcal/kg, 30 kcal/kg)    Protein (gm)  67-84 (1.2 - 1.5 gm/kg)    Fluid (mL)   (1 mL/kcal)     Current Nutrition Orders & Evaluation of Intake       Oral Nutrition     Current PO Diet NPO Diet NPO Type: Tube Feeding   Supplement n/a   PO Evaluation     Trending % PO Intake     Factors Affecting Intake  altered respiratory status      Enteral Nutrition     Enteral Route NG    TF Delivery Method Continuous    Propofol Rate/Kcal     Current TF/Rate (mL) Diabetisource  "AC @ 55 mL/hr    TF Goal Rate (mL) 55    Current Water Flush (mL) 50ml/hr    Modular None    TF Residual  no or minimal residual    TF Tolerance tolerating    TF Observation Verified correct TF and water flush infusing per orders     Anthropometrics          Height    Weight Height: 154.9 cm (61\")  Weight: 49.3 kg (108 lb 11 oz) (10/28/24 0640)    BMI kg/m2 Body mass index is 20.54 kg/m².  Normal/Healthy (18.4 - 24.9)    Weight trend Unknown     Labs        Pertinent Labs Reviewed, listed below     Results from last 7 days   Lab Units 10/28/24  1445 10/28/24  0402 10/27/24  0742 10/26/24  2015 10/24/24  1502 10/24/24  0333   SODIUM mmol/L  --  152* 150* 147*   < > 136   POTASSIUM mmol/L 3.9 3.9 4.0 4.0   < > 3.6   CHLORIDE mmol/L  --  113* 110* 109*   < > 100   CO2 mmol/L  --  28.1 29.0 28.2   < > 24.8   BUN mg/dL  --  50* 53* 38*   < > 30*   CREATININE mg/dL  --  1.36* 1.60* 1.39*   < > 1.63*   CALCIUM mg/dL  --  8.5* 9.0 9.2   < > 9.4   BILIRUBIN mg/dL  --  0.3  --  0.3  --  0.4   ALK PHOS U/L  --  159*  --  159*  --  115   ALT (SGPT) U/L  --  34*  --  38*  --  24   AST (SGOT) U/L  --  62*  --  51*  --  58*   GLUCOSE mg/dL  --  131* 197* 184*   < > 178*    < > = values in this interval not displayed.     Results from last 7 days   Lab Units 10/28/24  0402 10/24/24  0333 10/23/24  0236   PHOSPHORUS mg/dL 3.6   < >  --    HEMOGLOBIN g/dL 11.4*   < > 14.8   HEMATOCRIT % 34.3   < > 43.0   WBC 10*3/mm3 14.30*   < > 14.22*   TRIGLYCERIDES mg/dL  --   --  311*   ALBUMIN g/dL 2.8*   < >  --     < > = values in this interval not displayed.     Results from last 7 days   Lab Units 10/28/24  0402 10/27/24  0527 10/26/24  0307 10/25/24  0404 10/24/24  0333 10/23/24  0843 10/23/24  0236 10/22/24  1722 10/22/24  1646 10/22/24  1410 10/22/24  1150   INR   --   --   --   --   --   --   --   --   --   --  1.02   APTT seconds  --   --   --   --   --  23.6  --  49.8*  --  133.1* 22.4*   PLATELETS 10*3/mm3 395 486* 359 316 304  --    " < >  --    < >  --  363    < > = values in this interval not displayed.     SARS-CoV-2, SALVADOR   Date Value Ref Range Status   04/29/2022 NEGATIVE Negative Final     Comment:     The 2019-CoV rRT-PCR Assay is only for use under a Food and Drug Administration Emergency Use Authorization. The performance characteristics of the assay were verified by the Clinical Laboratory at [facility]. Results should be used in   conjunction with the patient's clinical symptoms, medical history, and other clinical/laboratory findings to determine an overall clinical diagnosis. Negative results do not preclude infection with SARS-CoV-2 (COVID-19).    Test parameters have not been validated for screening asymptomatic patients.     Lab Results   Component Value Date    HGBA1C 14.70 (H) 10/23/2024          Medications            Scheduled Medications aspirin, 81 mg, Nasogastric, Daily  atorvastatin, 40 mg, Oral, Nightly  castor oil-balsam peru, 1 Application, Topical, Q12H  chlorhexidine, 15 mL, Mouth/Throat, Q12H  clopidogrel, 75 mg, Nasogastric, Daily  enoxaparin, 30 mg, Subcutaneous, Nightly  famotidine, 20 mg, Nasogastric, Daily  insulin glargine, 30 Units, Subcutaneous, Q12H  insulin regular, 4-24 Units, Subcutaneous, Q4H  metoprolol tartrate, 12.5 mg, Oral, Q12H  venlafaxine, 37.5 mg, Nasogastric, BID        Infusions milrinone, 0.25 mcg/kg/min, Last Rate: 0.25 mcg/kg/min (10/27/24 1522)        PRN Medications   acetaminophen    Calcium Replacement - Follow Nurse / BPA Driven Protocol    dextrose    dextrose    glucagon (human recombinant)    hydrALAZINE    HYDROcodone-acetaminophen    ipratropium-albuterol    ipratropium-albuterol    loperamide    Magnesium Standard Dose Replacement - Follow Nurse / BPA Driven Protocol    nitroglycerin    OLANZapine    ondansetron ODT **OR** ondansetron    Phosphorus Replacement - Follow Nurse / BPA Driven Protocol    Potassium Replacement - Follow Nurse / BPA Driven Protocol     Potassium Replacement - Follow Nurse / BPA Driven Protocol    [COMPLETED] Insert Peripheral IV **AND** sodium chloride     Physical Findings          Physical Appearance ventilator support   Oral/Mouth Cavity tooth or teeth missing   Edema  not assessed   Gastrointestinal hypoactive bowel sounds, last bowel movement: 10/27   Skin  bruising   Tubes/Drains Cortrak, NG tube, bridle in place   NFPE Not indicated at this time     NUTRITION INTERVENTION / PLAN OF CARE  Intervention Goal         Intervention Goal(s) Maintain nutrition status, Reduce/improve symptoms, Meet estimated needs, Disease management/therapy, Tolerate TF/PN at goal, Transition TF to PO, and Maintain weight     Nutrition Intervention         RD Action Continue to monitor and Care plan reviewed     Prescription         Diet Prescription     Supplement Prescription    EN/PN Prescription    New Prescription Ordered? Continue same per protocol   --  Enteral Prescription:      Enteral Route NG    TF Delivery Method Continuous    Enteral Product Diabetisource AC    Modular None    Propofol Rate/Kcal     TF Start Rate 20    TF Goal Rate 60    Free Water Flush 100ml/hr    TF Provision at Goal: 1584kcal, 79 gm protein, 1082 mL free water + 2400 mL water flushes         Calories 100 % needs met         Protein  100 % needs met         Fluid (mL)      Prescription Ordered Yes      Monitor/Evaluation        Monitor Per protocol, I&O, Pertinent labs, EN delivery/tolerance, Weight, Skin status, GI status, Symptoms   Discharge Needs Pending clinical course       Electronically signed by:  Florecita Forrest RD  10/28/24 15:40 EDT

## 2024-10-28 NOTE — PROGRESS NOTES
"      [facility]         Dr Ngo Sayied   LOS: 6 days   Patient Care Team:  Germaine James APRN as PCP - General (Nurse Practitioner)    Chief Complaint: Acute left MCA stroke with acute ST MI cardiomyopathy acute hypoxemic respiratory failure COPD multiple issues as listed below    Interval History: \Confused and nonverbal.  Currently down to 2 L oxygen nasal cannula.    REVIEW OF SYSTEMS:   Unable to get with patient's current condition    Ventilator/Non-Invasive Ventilation Settings (From admission, onward)     Nasal cannula 2 L        Vital Signs  Temp:  [97.3 °F (36.3 °C)-99.5 °F (37.5 °C)] 98.4 °F (36.9 °C)  Heart Rate:  [] 107  Resp:  [16] 16  BP: (103-137)/(67-92) 123/78    Intake/Output Summary (Last 24 hours) at 10/28/2024 1422  Last data filed at 10/28/2024 1123  Gross per 24 hour   Intake 2470 ml   Output 575 ml   Net 1895 ml     Flowsheet Rows      Flowsheet Row First Filed Value   Admission Height 154.9 cm (61\") Documented at 10/22/2024 1700   Admission Weight 54 kg (119 lb 0.8 oz) Documented at 10/22/2024 1214            Physical Exam:  Patient is examined using the personal protective equipment as per guidelines from infection control for this particular patient as enacted.  Hand hygiene was performed before and after patient interaction.   General Appearance:  Appears to be chronically ill with right hemiparesis moves left upper and lower extremity.  Nonverbal.  ENT normocephalic atraumatic  Neck midline trachea, no thyromegaly   Lungs:   Diminished breath sounds with crackles right greater than left    Heart:    Regular rhythm and normal rate, normal S1 and S2, no            murmur, no gallop, no rub, no click   Chest Wall:    No abnormalities observed   Abdomen:     Normal bowel sounds, no masses, no organomegaly, soft        nontender, nondistended, no guarding, no rebound                tenderness   Extremities:   Moves all extremities well, no edema, no cyanosis, no "             redness  CNS right hemiparesis  Skin no rashes no nodules  Musculoskeletal no cyanosis no clubbing normal range of motion     Results Review:        Results from last 7 days   Lab Units 10/28/24  0402 10/27/24  0742 10/26/24  2015   SODIUM mmol/L 152* 150* 147*   POTASSIUM mmol/L 3.9 4.0 4.0   CHLORIDE mmol/L 113* 110* 109*   CO2 mmol/L 28.1 29.0 28.2   BUN mg/dL 50* 53* 38*   CREATININE mg/dL 1.36* 1.60* 1.39*   GLUCOSE mg/dL 131* 197* 184*   CALCIUM mg/dL 8.5* 9.0 9.2     Results from last 7 days   Lab Units 10/22/24  1646 10/22/24  1150   HSTROP T ng/L >10,000* 3,432*     Results from last 7 days   Lab Units 10/28/24  0402 10/27/24  0527 10/26/24  0307   WBC 10*3/mm3 14.30* 12.28* 11.13*   HEMOGLOBIN g/dL 11.4* 12.4 12.4   HEMATOCRIT % 34.3 38.9 36.6   PLATELETS 10*3/mm3 395 486* 359     Results from last 7 days   Lab Units 10/23/24  0843 10/22/24  1722 10/22/24  1410 10/22/24  1150   INR   --   --   --  1.02   APTT seconds 23.6 49.8* 133.1* 22.4*     Results from last 7 days   Lab Units 10/23/24  0236   CHOLESTEROL mg/dL 245*         Results from last 7 days   Lab Units 10/23/24  0236   CHOLESTEROL mg/dL 245*   TRIGLYCERIDES mg/dL 311*   HDL CHOL mg/dL 37*   LDL CHOL mg/dL 150*     Results from last 7 days   Lab Units 10/25/24  0407   PH, ARTERIAL pH units 7.463*   PO2 ART mm Hg 66.0*   PCO2, ARTERIAL mm Hg 33.2*   HCO3 ART mmol/L 23.8       I reviewed the patient's new clinical results.  I personally viewed and interpreted the patient's chest x-ray.        Medication Review:   aspirin, 81 mg, Nasogastric, Daily  atorvastatin, 40 mg, Oral, Nightly  castor oil-balsam peru, 1 Application, Topical, Q12H  chlorhexidine, 15 mL, Mouth/Throat, Q12H  clopidogrel, 75 mg, Nasogastric, Daily  enoxaparin, 30 mg, Subcutaneous, Nightly  famotidine, 20 mg, Nasogastric, Daily  insulin glargine, 30 Units, Subcutaneous, Q12H  insulin regular, 4-24 Units, Subcutaneous, Q4H  metoprolol tartrate, 12.5 mg, Oral,  Q12H  venlafaxine, 37.5 mg, Nasogastric, BID        milrinone, 0.25 mcg/kg/min, Last Rate: 0.25 mcg/kg/min (10/27/24 1522)        ASSESSMENT:   Acute left MCA ischemic stroke, status post TNK and thrombectomy  Acute STEMI status post primary PCI  Acute respiratory failure requiring invasive mechanical ventilation  CAD with history CABG and prior stents  Ischemic cardiomyopathy with EF 10%  Uncontrolled hyperglycemia with blood sugar very high  Type 2 diabetes mellitus  COPD  Current cigarette smoker  Obesity  Hyponatremia  Elevated liver enzymes  NICHO  Fever  Positive UDS  Peripheral vascular disease  Acute renal failure    PLAN:  Respiratory status stable and down to 2 L oxygen nasal cannula.  Continue to wean as tolerated.  Hospitalist following on the floor we will only address pulmonary issue  Appreciate input from nephrologist.  Fluid electrolyte management per nephrology.  Watch for aspiration  Minimally worse leukocytosis.  Procalcitonin stable.  Continue oral antibiotics.  Tube feeds to continue  Multiple medical issues hospitalist managing  Pulmonary status remains stable  Hospitalist following on the floor  Nothing further to add we will sign off          Jeni Toure MD  10/28/24  14:22 EDT

## 2024-10-28 NOTE — PLAN OF CARE
Problem: Restraint, Nonviolent  Goal: Absence of Harm or Injury  Outcome: Progressing   Goal Outcome Evaluation:

## 2024-10-28 NOTE — PROGRESS NOTES
[facility] Progress Note      Patient Name: Albina Sosa  : 1965  MRN: [id number]  Primary Care Physician:  Germaine James APRN  Date of admission: 10/22/2024    Subjective     Interval History:   The patient was seen and examined today for follow-up on acute kidney injury.  Confused agitated    Review of Systems:   As noted above    Objective     Vitals:   Temp:  [97.3 °F (36.3 °C)-99.5 °F (37.5 °C)] 99.5 °F (37.5 °C)  Heart Rate:  [] 107  Resp:  [16-17] 16  BP: ()/(30-92) 105/84  Flow (L/min) (Oxygen Therapy):  [2] 2    Intake/Output Summary (Last 24 hours) at 10/28/2024 1028  Last data filed at 10/28/2024 0636  Gross per 24 hour   Intake 1992 ml   Output 575 ml   Net 1417 ml       Physical Exam:    General Appearance: alert, oriented x 3, no acute distress   Skin: warm and dry  HEENT: oral mucosa normal, nonicteric sclera  Neck: supple, no JVD  Lungs: CTA  Heart: RRR, normal S1 and S2  Abdomen: soft, nontender, nondistended  : no palpable bladder  Extremities: no edema, cyanosis or clubbing  Neuro: normal speech and mental status     Scheduled Meds:     aspirin, 81 mg, Nasogastric, Daily  atorvastatin, 40 mg, Oral, Nightly  chlorhexidine, 15 mL, Mouth/Throat, Q12H  clopidogrel, 75 mg, Nasogastric, Daily  enoxaparin, 30 mg, Subcutaneous, Nightly  famotidine, 20 mg, Nasogastric, Daily  insulin glargine, 30 Units, Subcutaneous, Q12H  insulin regular, 4-24 Units, Subcutaneous, Q4H  ipratropium-albuterol, 3 mL, Nebulization, 4x Daily - RT  metoprolol tartrate, 12.5 mg, Oral, Q12H  venlafaxine, 37.5 mg, Nasogastric, BID      IV Meds:   milrinone, 0.25 mcg/kg/min, Last Rate: 0.25 mcg/kg/min (10/27/24 1522)        Results Reviewed:   I have personally reviewed the results from the time of this admission to 10/28/2024 10:28 EDT     Results from last 7 days   Lab Units 10/28/24  0402 10/27/24  0742 10/26/24  2015 10/24/24  1502 10/24/24  0333   SODIUM mmol/L 152*  150* 147*   < > 136   POTASSIUM mmol/L 3.9 4.0 4.0   < > 3.6   CHLORIDE mmol/L 113* 110* 109*   < > 100   CO2 mmol/L 28.1 29.0 28.2   < > 24.8   BUN mg/dL 50* 53* 38*   < > 30*   CREATININE mg/dL 1.36* 1.60* 1.39*   < > 1.63*   CALCIUM mg/dL 8.5* 9.0 9.2   < > 9.4   BILIRUBIN mg/dL 0.3  --  0.3  --  0.4   ALK PHOS U/L 159*  --  159*  --  115   ALT (SGPT) U/L 34*  --  38*  --  24   AST (SGOT) U/L 62*  --  51*  --  58*   GLUCOSE mg/dL 131* 197* 184*   < > 178*    < > = values in this interval not displayed.       Estimated Creatinine Clearance: 35.1 mL/min (A) (by C-G formula based on SCr of 1.36 mg/dL (H)).    Results from last 7 days   Lab Units 10/28/24  0402 10/27/24  1847 10/27/24  0742   PHOSPHORUS mg/dL 3.6 3.6 1.9*       Results from last 7 days   Lab Units 10/28/24  0402   URIC ACID mg/dL 7.7*       Results from last 7 days   Lab Units 10/28/24  0402 10/27/24  0527 10/26/24  0307 10/25/24  0404 10/24/24  0333   WBC 10*3/mm3 14.30* 12.28* 11.13* 10.98* 15.34*   HEMOGLOBIN g/dL 11.4* 12.4 12.4 11.1* 12.2   PLATELETS 10*3/mm3 395 486* 359 316 304       Results from last 7 days   Lab Units 10/22/24  1150   INR  1.02       Assessment / Plan     ASSESSMENT:  1.  NICHO, with UOP not recorded, likely prerenal from hypotension and hypovolemia.  Severe hypernatremia from water deficit.  Normal potassium and anion gap; low phosphorus from poor nutrition  2.  STEMI s/p primary PCI/stent; has severe ischemic cardiomyopathy with a EF now just 14%  3.  Cardioembolic stroke  4.  DM2  5.  Substance abuse  6.  COPD  7.  DM2, uncontrolled with A1c nearly 15%        PLAN:  Continues to be hypernatremic  We will increase free water flushes to 100 cc an hour  Labs in a.m.      Thank you for involving us in the care of Albina Sosa.  Please feel free to call with any questions.    Alley White MD  10/28/24  10:28 EDT    [facility]  [phone]    Parts of this note may be an electronic  transcription/translation of spoken language to printed text using the Dragon dictation system.

## 2024-10-28 NOTE — PROGRESS NOTES
"DOS: 10/28/2024  NAME: Albina Sosa   : 1965  PCP: Germaine James APRN  Chief Complaint   Patient presents with    Shortness of Breath    Chest Pain       Chief complaint: agitation,  stroke  Subjective: No family at bedside.  Moved to floor yesterday.  NG and NC in place.  BP in range.  No purposeful mvmts, global aphasia    Objective:  Vital signs: /84   Pulse 107   Temp 99.5 °F (37.5 °C) (Oral)   Resp 16   Ht 154.9 cm (61\")   Wt 49.3 kg (108 lb 11 oz)   SpO2 91%   BMI 20.54 kg/m²      Gen: NAD, vitals reviewed  MS: stuporous, briefly attends examiner, non verbal, does not follow commands  CN: blink to threat difficult, eyes closed forcibly, PERRL, no dysconjugate gaze or nystagmus seen, R facial  Motor: spontaneous mvmts on L, localizes on R  Sensory: intact to light touch all 4 ext.    ROS:  No weakness, numbness  No fevers, chills      Laboratory results:  Lab Results   Component Value Date    GLUCOSE 131 (H) 10/28/2024    CALCIUM 8.5 (L) 10/28/2024     (H) 10/28/2024    K 3.9 10/28/2024    CO2 28.1 10/28/2024     (H) 10/28/2024    BUN 50 (H) 10/28/2024    CREATININE 1.36 (H) 10/28/2024    EGFRIFAFRI 69 2022    EGFRIFNONA 60 2022    BCR 36.8 (H) 10/28/2024    ANIONGAP 10.9 10/28/2024     Lab Results   Component Value Date    WBC 14.30 (H) 10/28/2024    HGB 11.4 (L) 10/28/2024    HCT 34.3 10/28/2024    MCV 91.7 10/28/2024     10/28/2024     Lab Results   Component Value Date     (H) 10/23/2024    LDL  2023      Comment:        LDL cholesterol not calculated. Triglyceride levels  greater than 400 mg/dL invalidate calculated LDL results.    Reference range: <100    Desirable range <100 mg/dL for primary prevention;    <70 mg/dL for patients with CHD or diabetic patients   with > or = 2 CHD risk factors.    LDL-C is now calculated using the Narciso-Ortiz   calculation, which is a validated novel method providing   better accuracy than the " Friedewald equation in the   estimation of LDL-C.   Narciso SS et al. HIMA. 2013;310(19): 2003-2327   (http://education.[employer].Last Size/faq/ESW638)    LDL  07/18/2023      Comment:        LDL cholesterol not calculated. Triglyceride levels  greater than 400 mg/dL invalidate calculated LDL results.    Reference range: <100    Desirable range <100 mg/dL for primary prevention;    <70 mg/dL for patients with CHD or diabetic patients   with > or = 2 CHD risk factors.    LDL-C is now calculated using the Narciso-Ortiz   calculation, which is a validated novel method providing   better accuracy than the Friedewald equation in the   estimation of LDL-C.   Narciso SS et al. HIMA. 2013;310(19): 8280-3949   (http://education.[employer].Last Size/faq/HFL577)         Lab 10/23/24  0236   HEMOGLOBIN A1C 14.70*        Review of labs:     Review and interpretation of imaging:   MRI brain w/o 10/22     FINDINGS: There is a moderate to large area of restricted diffusion  within the left middle cerebral artery distribution. This involves the  posterior left frontal lobe and left parietal and occipital lobes  measuring approximately 8 cm in AP dimension by 3.9 cm transverse. There  are smaller foci of cortical and subcortical white matter restricted  diffusion within the posterior left frontal lobe also within the left  MCA distribution.. No right cerebral or cerebral hemisphere infarction  is evident.     There are small foci of gradient echo diminished signal within the left  MCA distribution infarction consistent with areas of petechial  hemorrhage without evidence for large hemorrhage. Ventricles appear  normal in size and configuration. The major intracranial flow voids  appear within normal limits.     Small chronic lacunar infarctions are present within the right corona  radiata and posterior head of the right caudate nucleus as well as  within the left gloria.     IMPRESSION:  1. Moderate to large acute left middle cerebral  artery distribution  infarction with involvement of the left posterior left frontal lobe,  left parietal lobe, left occipital lobe. Suspect small foci of petechial  hemorrhage associated with the infarction without evidence for large  hemorrhage or shift of the midline structures.  2. Small chronic lacunar infarctions within the right corona radiata,  right gloria, posterior right caudate head.     HCT 10/24  FINDINGS: Again identified is an ischemic infarct involving the left MCA  vascular distribution unchanged in area as compared to prior examination  including involvement of the lateral aspect of the left parietal and  occipital lobes with extension to the insular cortex and operculum. The  area of infarction measures approximately 10 cm in AP dimension. There  is continued dissipation of contrast staining. There is no evidence of  hemorrhage.     A new ischemic infarct is appreciated involving the medial aspect of the  left cerebellar hemisphere measuring 2.2 x 1.6 x 2.5 cm in size.     IMPRESSION:  There is involving a large ischemic infarct involving the  left MCA vascular distribution with dissipating contrast staining. A new  ischemic infarct involving the medial aspect of the left cerebellar  hemisphere is appreciated measuring approximately 2.2 x 1.6 x 2.5 cm in  size.    Workup to date:  CT head and neck not completed for agitation    Carotid Doppler less than 50% stenosis bilaterally    TTE    Left ventricular systolic function is severely decreased. Calculated left ventricular EF = 14.2%, visually estimated LVEF 20-25%.    There is global hypokinesis.  Additionally, the following left ventricular wall segments are akinetic: basal anterolateral, mid anterolateral, apical lateral, basal inferolateral, mid inferolateral and apex.    Left ventricular wall thickness is consistent with moderate concentric hypertrophy.    Left ventricular mass index is increased.  May consider infiltrative cardiomyopathy in the  appropriate clinical setting.    No obvious LV thrombus noted including on echo contrast images.    Mild-moderate mitral regurgitation.    Borderline left atrial enlargement, normal RA and IVC size.    There is a small (<1cm) pericardial effusion. There is no evidence of cardiac tamponade.     Diagnoses:  Large L MCA stroke  STEMI  Med non compliance  PSA    Impression: 59-year-old female with past medical history of CAD, tobacco abuse, medication noncompliance, PSA, hypertension, hyperlipidemia, diabetes who presented on 10/22 with chest discomfort and dyspnea, was was admitted  with STEMI.  She was found to have multivessel disease and underwent balloon angioplasty and stent placement, EF about 10%..  She was very sleepy thereafter requiring flumazenil and Narcan.  She became agitated thereafter.  There was concern for left MCA syndrome, she received TNK and team D imaging revealed left M2 LVO and she underwent mechanical thrombectomy with TICI 2c flow    She has since moved out of the ICU.  Serial imaging showed stable large left MCA territory infarct and interval left cerebellar stroke.  She is on dapt presently.  CTA not done for patient agitation.  She had no hemodynamic carotid stenosis per Doppler.  TTE shows borderline left atrial enlargement, EF of 14%, LVH, increased left ventricular mass possibly cardiomyopathy no obvious LV thrombus.  On exam she is globally aphasic, blinks to threat bilaterally with withdrawal on the right    Repeat HCT today.  Neuro exam appears stable, with full MCA syndrome, and requiring supplemental O2.  Continue supportive care, anticipate poor longterm outcome with need for PEG and skilled nursing.  LV thrombus is also consideration with ongoing increased stroke risk.  Agree with palliative consult for ongoing family discusssion      Thank you for this consultation.  Discussed above plan with neuro attending, Dr. Robert who agrees with above plan.  Neurology team is available  for concerns or questions.

## 2024-10-28 NOTE — PLAN OF CARE
Goal Outcome Evaluation:              Outcome Evaluation: Discussed with RN. Pt is not alert or following commands to participate in Clinical Swallow Eval, NIH is 24. Cortrak is in place. Palliative consulted. ST to follow up later this week to determine if pt is appropriate pending GOC.

## 2024-10-28 NOTE — CONSULTS
[facility] Consult Note      Patient Name: Albina Sosa  : 1965  MRN: [id number]  Primary Care Physician:  Germaine James APRN  Referring Physician: No ref. provider found  Date of admission: 10/22/2024    Subjective     Reason for Consult:  NICHO and hyperNa    HPI:   Albina Sosa is a 59 y.o. female with unclear baseline SCR, admitted 10/22 with chest pain.  Full PMH outlined below.  Presentation and EKG concerning for STEMI, and so taken emergently to catheterization lab where multivessel disease found.  Procedure complicated by stroke, for which TNK and later thrombectomy performed.  Fairly unresponsive, dependent on tube feeds and water flushes.  SCR 1.4 yesterday and 1.6 today; serum sodium normal 10/24, with rise since: 147 yesterday and 150 today.  Water flushes at 20 mL every 2 hours.    Review of Systems:   Not obtainable from the patient; she is obtunded    Personal History     History reviewed. No pertinent past medical history.    Past Surgical History:   Procedure Laterality Date    CARDIAC CATHETERIZATION N/A 10/22/2024    Procedure: Left Heart Cath;  Surgeon: Bret Lawrence MD;  Location: [facility] CATH INVASIVE LOCATION;  Service: Cardiovascular;  Laterality: N/A;    CARDIAC CATHETERIZATION N/A 10/22/2024    Procedure: Stent LAM coronary;  Surgeon: Bret Lawrence MD;  Location: [facility] CATH INVASIVE LOCATION;  Service: Cardiovascular;  Laterality: N/A;    CARDIAC CATHETERIZATION N/A 10/22/2024    Procedure: Percutaneous Coronary Intervention;  Surgeon: Bret Lawrence MD;  Location: [facility] C[facility]AS[facility] LOC[facility];  Service: Cardiovascular;  Laterality: N/A;    CARDIAC CATHETERIZATION N/A 10/22/2024    Procedure: Coronary angiography;  Surgeon: Bret Lawrence MD;  Location: [facility] CATH INVASIVE LOCATION;  Service: Cardiovascular;  Laterality: N/A;    EMBOLECTOMY N/A 10/22/2024    Procedure: EMBOLECTOMY MECHANICAL;  Surgeon: Jeremiah Carey MD;   Location: [facility] OR;  Service: Interventional Radiology;  Laterality: N/A;       Family History: family history is not on file.    Social History:      Home Medications:  Prior to Admission medications    Not on File       Allergies:  Allergies   Allergen Reactions    Bactrim [Sulfamethoxazole-Trimethoprim] Hives    Cefaclor Hives    Flexeril [Cyclobenzaprine] Hives    Robaxin [Methocarbamol] Unknown - High Severity       Objective     Vitals:   Temp:  [97.3 °F (36.3 °C)-99.9 °F (37.7 °C)] 98.8 °F (37.1 °C)  Heart Rate:  [] 93  Resp:  [16-20] 16  BP: ()/() 137/77  Flow (L/min) (Oxygen Therapy):  [2-3] 2    Intake/Output Summary (Last 24 hours) at 10/27/2024 2017  Last data filed at 10/27/2024 1858  Gross per 24 hour   Intake 588 ml   Output --   Net 588 ml       Physical Exam:   Constitutional: Response to noxious stimuli, nonverbal and lethargic; looks much older than age  HEENT: Sclera anicteric, no conjunctival injection, dry MM  Neck: Supple, no carotid bruit, trachea at midline, no JVD  Respiratory: Coarse anteriorly, tachypneic on 2 L/min  Cardiovascular: RR, tachycardic, no rub   Gastrointestinal: BS +, soft, no grimacing, nondistended  : No palpable bladder  Musculoskeletal: No edema, no clubbing or cyanosis  Psychiatric: Withdraws to noxious stimuli  Neurologic: Right hemiparesis  Skin: Warm and dry; bandaged ulcer right heel       Scheduled Meds:     amoxicillin-clavulanate, 500 mg, Oral, Q12H  aspirin, 81 mg, Nasogastric, Daily  atorvastatin, 40 mg, Oral, Nightly  chlorhexidine, 15 mL, Mouth/Throat, Q12H  clopidogrel, 75 mg, Nasogastric, Daily  enoxaparin, 30 mg, Subcutaneous, Nightly  famotidine, 20 mg, Nasogastric, Daily  insulin glargine, 30 Units, Subcutaneous, Q12H  insulin regular, 4-24 Units, Subcutaneous, Q4H  ipratropium-albuterol, 3 mL, Nebulization, 4x Daily - RT  metoprolol tartrate, 12.5 mg, Oral, Q12H  venlafaxine, 37.5 mg, Nasogastric, BID      IV Meds:    milrinone, 0.25 mcg/kg/min, Last Rate: 0.25 mcg/kg/min (10/27/24 1522)        Results Reviewed:   I have personally reviewed the results from the time of this admission to 10/27/2024 20:17 EDT     Lab Results   Component Value Date    GLUCOSE 197 (H) 10/27/2024    CALCIUM 9.0 10/27/2024     (H) 10/27/2024    K 4.0 10/27/2024    CO2 29.0 10/27/2024     (H) 10/27/2024    BUN 53 (H) 10/27/2024    CREATININE 1.60 (H) 10/27/2024    EGFRIFAFRI 69 06/09/2022    EGFRIFNONA 60 06/09/2022    BCR 33.1 (H) 10/27/2024    ANIONGAP 11.0 10/27/2024      Lab Results   Component Value Date    MG 1.9 10/14/2022    PHOS 3.6 10/27/2024    ALBUMIN 3.0 (L) 10/27/2024           Assessment / Plan       STEMI involving left circumflex coronary artery    Acute ischemic left middle cerebral artery (MCA) stroke    Hyperlipemia    Acute hypoxic respiratory failure    COPD (chronic obstructive pulmonary disease)    Type 2 diabetes mellitus with hyperglycemia    Ischemic cardiomyopathy    Elevated liver enzymes    Polysubstance abuse    Peripheral vascular disease      ASSESSMENT:  1.  NICHO, with UOP not recorded, likely prerenal from hypotension and hypovolemia.  Severe hypernatremia from water deficit.  Normal potassium and anion gap; low phosphorus from poor nutrition  2.  STEMI s/p primary PCI/stent; has severe ischemic cardiomyopathy with a EF now just 14%  3.  Cardioembolic stroke  4.  DM2  5.  Substance abuse  6.  COPD  7.  DM2, uncontrolled with A1c nearly 15%    PLAN:  1.  Increase free water flushes to 50 mL/h.  With this degree of hypernatremia, water will leave intravascular space quickly and move into cells   2.  Bladder scan  3.  FENa  4.  Surveillance labs    Thank you for involving us in the care of Albina Sosa.  Please feel free to call with any questions.    Dino Kim MD  10/27/24  20:17 EDT    [facility]  [phone]      Please note that portions of this note were completed  with a voice recognition program.

## 2024-10-28 NOTE — PROGRESS NOTES
"Enter Query Response Below      Query Response: Type I MI due to plaque rupture             If applicable, please update the problem list.   Patient: Albina Darden        : 1965  Account: [id number]           Admit Date:         How to Respond to this query:       a. Click New Note     b. Answer query within the yellow box.                c. Update the Problem List, if applicable.      If you have any questions about this query contact me at: [email or web address]     :     Patient presented to ED on 10/22 with acute dyspnea and chest discomfort. Admitted with a STEMI. Treated for acute systolic heart failure and ischemic cardiomyopathy with IV diuresis, stent to mid marginal branch of the circumflex and monitoring.  HS Troponin T 3,432, > 10,000.  10/22 Per H&P \"ECG 10/22/2024 tracings reviewed myself: Sinus tachycardia lateral ST elevations with reciprocal changes noted.\"  \"STEMI.\"  10/22 Per heart cath report \"Severe multivessel coronary artery disease with 100% ostial LAD, circumflex contains 100% stenosis of the mid marginal branch at the distal stent edge likely culprit for STEMI presentation, left-sided PDA from the circumflex has 100% proximal segment stenoses with left to left collaterals from a LIMA to LAD supplying the distal PDA, patent LIMA to LAD LAD is diffusely diseased small in caliber size with sequential 80% stenoses.  LVEDP of approximately 30 mmHg.\"  \"Successful PCI to mid marginal with placement of a 2.25 x 12 mm Xience drug-eluting stent.\"    Please clarify the type of MI the patient was treated/monitored for:    Type 1 MI due to ______ (please specify- plaque erosion, rupture, fissure or thrombus)  Type 2 MI due to acute systolic heart failure and ischemic cardiomyopathy.  Other- specify_________    By submitting this query, we are merely seeking further clarification of documentation to accurately reflect all conditions that you are monitoring, evaluating, treating or that " extend the hospitalization or utilize additional resources of care. Please utilize your independent clinical judgment when addressing the question(s) above.     This query and your response, once completed, will be entered into the legal medical record.    Sincerely,  Eve Andujar RN  Clinical Documentation Integrity Program

## 2024-10-28 NOTE — PROGRESS NOTES
Name: Albina Sosa ADMIT: 10/22/2024   : 1965  PCP: Germaine James APRN    MRN: [id number] LOS: 6 days   AGE/SEX: 59 y.o. female  ROOM: [facility]     Subjective   Subjective   Patient is seen at bedside.       Objective   Objective   Vital Signs  Temp:  [98.1 °F (36.7 °C)-99.5 °F (37.5 °C)] 98.6 °F (37 °C)  Heart Rate:  [] 109  Resp:  [16] 16  BP: (103-137)/(69-92) 119/89  SpO2:  [90 %-94 %] 92 %  on  Flow (L/min) (Oxygen Therapy):  [2-3] 3;   Device (Oxygen Therapy): nasal cannula  Body mass index is 20.41 kg/m².  Physical Exam  Vitals and nursing note reviewed.   Constitutional:       Appearance: She is ill-appearing (Chronically ill-appearing).   Eyes:      General: No scleral icterus.     Conjunctiva/sclera: Conjunctivae normal.      Pupils: Pupils are equal, round, and reactive to light.   Cardiovascular:      Rate and Rhythm: Regular rhythm. Tachycardia present.      Pulses: Normal pulses.      Heart sounds: Normal heart sounds.   Pulmonary:      Effort: Pulmonary effort is normal. No respiratory distress.      Breath sounds: Normal breath sounds.   Abdominal:      General: Bowel sounds are normal. There is no distension.      Palpations: Abdomen is soft.      Tenderness: There is no abdominal tenderness.   Musculoskeletal:      Right lower leg: No edema.      Left lower leg: No edema.   Skin:     General: Skin is warm and dry.   Neurological:      Mental Status: She is alert.      Comments: Right hemiparesis, spontaneously moves left upper and lower extremity. Nonverbal. Right facial droop noted.            Copied text material from yesterday's note has been reviewed for appropriate changes and remains accurate as of 10/28/24.      Results Review     I reviewed the patient's new clinical results.  Results from last 7 days   Lab Units 10/28/24  0402 10/27/24  0527 10/26/24  0307 10/25/24  0404   WBC 10*3/mm3 14.30* 12.28* 11.13* 10.98*   HEMOGLOBIN g/dL 11.4* 12.4 12.4 11.1*   PLATELETS  10*3/mm3 395 486* 359 316     Results from last 7 days   Lab Units 10/28/24  1445 10/28/24  0402 10/27/24  0742 10/26/24  2015 10/26/24  1025 10/26/24  0307   SODIUM mmol/L  --  152* 150* 147*  --  148*   POTASSIUM mmol/L 3.9 3.9 4.0 4.0   < > 3.3*   CHLORIDE mmol/L  --  113* 110* 109*  --  110*   CO2 mmol/L  --  28.1 29.0 28.2  --  26.3   BUN mg/dL  --  50* 53* 38*  --  34*   CREATININE mg/dL  --  1.36* 1.60* 1.39*  --  1.38*   GLUCOSE mg/dL  --  131* 197* 184*  --  168*   EGFR mL/min/1.73  --  45.2* 37.2* 44.1*  --  44.5*    < > = values in this interval not displayed.     Results from last 7 days   Lab Units 10/28/24  0402 10/27/24  0742 10/26/24  2015 10/26/24  0307 10/25/24  0404 10/24/24  0333 10/22/24  1939   ALBUMIN g/dL 2.8* 3.0* 3.0* 2.7*   < > 2.8* 3.2*   BILIRUBIN mg/dL 0.3  --  0.3  --   --  0.4 0.7   ALK PHOS U/L 159*  --  159*  --   --  115 153*   AST (SGOT) U/L 62*  --  51*  --   --  58* 138*   ALT (SGPT) U/L 34*  --  38*  --   --  24 39*    < > = values in this interval not displayed.     Results from last 7 days   Lab Units 10/28/24  0402 10/27/24  1847 10/27/24  0742 10/26/24  2015 10/26/24  0307   CALCIUM mg/dL 8.5*  --  9.0 9.2 8.8   ALBUMIN g/dL 2.8*  --  3.0* 3.0* 2.7*   PHOSPHORUS mg/dL 3.6 3.6 1.9*  --  2.7     Results from last 7 days   Lab Units 10/23/24  1134 10/23/24  0843 10/23/24  0236 10/22/24  2251 10/22/24  1939   PROCALCITONIN ng/mL 0.41*  --  0.40*  --   --    LACTATE mmol/L  --  2.4* 3.5* 5.1* 5.4*     Glucose   Date/Time Value Ref Range Status   10/28/2024 1618 173 (H) 70 - 130 mg/dL Final   10/28/2024 1147 139 (H) 70 - 130 mg/dL Final   10/28/2024 0759 137 (H) 70 - 130 mg/dL Final   10/28/2024 0506 162 (H) 70 - 130 mg/dL Final   10/28/2024 0019 150 (H) 70 - 130 mg/dL Final   10/27/2024 2233 127 70 - 130 mg/dL Final   10/27/2024 1954 92 70 - 130 mg/dL Final       No radiology results for the last day    I have personally reviewed all medications:  Scheduled Medications  aspirin,  81 mg, Nasogastric, Daily  atorvastatin, 40 mg, Oral, Nightly  castor oil-balsam peru, 1 Application, Topical, Q12H  chlorhexidine, 15 mL, Mouth/Throat, Q12H  clopidogrel, 75 mg, Nasogastric, Daily  enoxaparin, 30 mg, Subcutaneous, Nightly  famotidine, 20 mg, Nasogastric, Daily  insulin glargine, 30 Units, Subcutaneous, Q12H  insulin regular, 4-24 Units, Subcutaneous, Q4H  metoprolol tartrate, 12.5 mg, Oral, Q12H  venlafaxine, 37.5 mg, Nasogastric, BID    Infusions  milrinone, 0.25 mcg/kg/min, Last Rate: 0.25 mcg/kg/min (10/28/24 6535)    Diet  NPO Diet NPO Type: Tube Feeding    I have personally reviewed:  [x]  Laboratory   [x]  Microbiology   [x]  Radiology   [x]  EKG/Telemetry  [x]  Cardiology/Vascular   []  Pathology    []  Records       Assessment/Plan     Active Hospital Problems    Diagnosis  POA    **STEMI involving left circumflex coronary artery [I21.21]  Yes    Hyperlipemia [E78.5]  Unknown    Acute hypoxic respiratory failure [J96.01]  Unknown    COPD (chronic obstructive pulmonary disease) [J44.9]  Unknown    Type 2 diabetes mellitus with hyperglycemia [E11.65]  Unknown    Ischemic cardiomyopathy [I25.5]  Unknown    Elevated liver enzymes [R74.8]  Unknown    Polysubstance abuse [F19.10]  Unknown    Peripheral vascular disease [I73.9]  Unknown    Acute ischemic left middle cerebral artery (MCA) stroke [I63.512]  No      Resolved Hospital Problems   No resolved problems to display.       59 y.o. female admitted with STEMI involving left circumflex coronary artery.    59 y.o. female  admitted to [facility] via EMS 10/22/24 for STEMI. She has history of coronary artery disease with CAB with LIMA to the LAD, with hx of PCI to circumflex and left PDA, hypertension, mixed hyperlipidemia, type 2 diabetes on insulin therapy, prior CVA, history of methamphetamine use, and ongoing tobacco use with underlying lung disease.  She underwent emergent coronary catheterization and was found to have multivessel disease with  severe cardiomyopathy and had a PCI with stent to culprit distal marginal branch with residual diffuse LAD and PDA disease with collaterals.  EF 14% by echo without obvious thrombus.  Post PCI she was noted to have increased agitation and right sided weakness with aphasia and stat neurology consult was placed and team D was activated.  CT of the head showed no acute hemorrhage or hypodensity, CTA of the head and neck was not performed at that time due to agitation.  Patient was given TNK and sent for angiogram out of concern for left MCA disease and had mechanical cranial artery embolectomy.          Acute left MCA ischemic stroke  - status post TNK and embolectomy  - Neurology following.  Repeat head imaging negative for hemorrhagic conversion per nephrology  -Remains on dual antiplatelet therapy and statin therapy  -Neurology is recommending BP systolic 140 or less  with strict blood pressure control to prevent hemorrhagic transformation  -Neurology has okayed use of Lovenox for DVT prophylaxis  -Continue therapies PT and OT  -LDL goal less than 70     Acute STEMI status post primary PCI/stent / ischemic cardiomyopathy  -Cardiac cath with multivessel disease with PCI/stent to mid marginal branch  -EF 14% by echo without clear evidence of left ventricular thrombus  -Cardiology following  -Currently on aspirin and Plavix  -History of prior CAB.   - cath with residual with diffuse LAD and PDA disease with collaterals.   -Was noted to have severely elevated left ventricular end-diastolic pressures in the Cath Lab and she was given IV diuretics with improvement in her chest x-ray imaging.  -Will need to monitor volume status closely.     Hyperlipidemia poorly controlled / hypertriglyceridemia  -Currently on atorvastatin  -Noted total cholesterol back in 2023 was 403 with an LDL incalculable with triglycerides over a thousand.  -Admission lipid panel does show total cholesterol of 245, HDL 37 , triglycerides 311.   LDL HDL ratio is 3.94  - goal LDL less than 70  -Certainly her poorly controlled diabetes is contributing to her elevated triglycerides and subsequent elevated LDL.  Glycemic control is needed.  Unsure regarding adherence to medical therapies as outpt.    - 10/26 TSH check normal.      Acute hypoxic respiratory failure/COPD  -Initially requiring ventilator support . Liberated from ventilator 10/25 now on 3 L nasal cannula  -Pulmonary is following  -Continue nebulizer with good pulmonary hygiene.      Type 2 diabetes, uncontrolled hyperglycemia   -A1c on admit 14.7  -Sugars over 600 on admission  -unclear on her adherence and DM regimen as an outpatient  -Currently on 30 units Lantus every 12.  Last dose adjustment 10/26 PM dosing and has only had 2 doses thus far of 30 units.    - FSBS trends improved  with mostly under 200.  Will give full 24 hours at current dosing before further adjustment.  Trend and reassess 10/27.   -She is on every 6 hour Accu-Cheks while on tube feeding with sliding scale insulin coverage.     History of tobacco use disorder   -Recommend full cessation of tobacco     Hyponatremia  -Initially hyponatremic on admission.  This has resolved now drifting a bit hypernatremic  -is on TF at 55 cc/hr.  She is getting 20 ml flush q 2 hours.  Will adjust free water and  increase flush free water to 30 cc q 2.   Will need to be cautious with fluid adjustment given her severe CM with EF 10%.   - creatinine is trending up 1.6 (1.39).   -Trend sodium     Elevated liver enzymes  -Likely from shock and low cardiac output.  Overall trend improving.  ALT has normalized AST continues to trend down     NICHO on CKD  -Creatinine improving.  Creatinine 1.38 today down from 1.64.  Did get some contrast load initially on admission likely contributed to rise in creatinine on 10/25  -He has component of chronic kidney disease from poorly controlled diabetes  -Avoid nephrotoxic agents     Fever  -Developed fever on  10/20.  This has resolved.  Low-grade temp with 24 hr Tmax 99.9   -She was started on 1023 Zosyn with plans by pulmonary to transition to Augmentin per tube   - 10/24/2024 respiratory culture showed scant amount of haemophilus influenza  -10/23 blood cultures no growth to date .  Noted 10/23 positive procalcitonin and leukocytosis worsening 10/22 which did improve on antibiotic therapy.      Positive UDS  -Positive for benzos and amphetamines on admission  -Continue to monitor for withdrawal  -Currently she is nonparticipatory in her care so access referral likely not helpful at this point     Peripheral vascular disease  -Doppler pulses on the right.  Vascular was consulted early on in admission  -Vascular felt she likely had peripheral vascular disease and recommended continue supportive care but did not feel surgery was needed and planned to monitor closely.  -Feet remain warm, Doppler pulses on right.     Urinary retention  -Man catheter removed 10/25.  Patient is requiring In-N-Out cath for high residuals 10/26 but was able to void large amount 10/27 in brief.   -Continue to monitor bladder scans, low threshold to replace Man if high residuals become issue and I/O cath needed routinely to address.      Tachycardia  - noted HR up to 120's.  ST.  ? Related to pain.  RN just medicated with Tylenol.  Recommended bladder scan monitoring.  Tachycardia likely multifactorial.  Does appear she is getting somewhat on the dry side given her rise in sodium.  Will add additional free water which may help with her tachycardia.  Will need to be cautious with hydration efforts given her cardiomyopathy with an EF of 14%.  - WBC trend slightly up 12 (11) but improved from 17.9 on 10/23.  Low grade temp 99.9  - Chest x-ray  10/26 bilateral alveolar and interstitial infiltrates seen on prior imaging the 10/25  -She is on low-dose metoprolol and does have some room for up titration given blood pressures are trending in the  130s.  Will defer to cardiology who is following.     DVT prophylaxis  Lovenox 30 mg SC daily     Discharge  She remains full code and full support.  Her prognosis is guarded.  No family currently at bedside  Expected discharge date/ time has not been documented. TBD, Will need SNF.      Discussed with patient and nursing staff.    Palliative care consult today for discussion of goals of care.      Rajat Mathews MD  [facility]  10/28/24  17:35 EDT

## 2024-10-28 NOTE — SIGNIFICANT NOTE
10/28/24 1541   OTHER   Discipline physical therapist   Rehab Time/Intention   Session Not Performed other (see comments)  (Spoke w RN, pt not appropriate for PT today, not following commands, also awaiting palliative consult, will follow up)   Recommendation   PT - Next Appointment 10/29/24

## 2024-10-29 NOTE — PROGRESS NOTES
"DOS: 10/29/2024  NAME: Albina Sosa   : 1965  PCP: Germaine James APRN  Chief Complaint   Patient presents with    Shortness of Breath    Chest Pain       Chief complaint: stroke  Subjective: neuro team paged overnight for not moving R side.  Repeat HCT completed and stable    Objective:  Vital signs: /82 (BP Location: Left arm, Patient Position: Lying)   Pulse 95   Temp 97.5 °F (36.4 °C) (Oral)   Resp 22   Ht 154.9 cm (61\")   Wt 60.2 kg (132 lb 11.5 oz)   SpO2 94%   BMI 25.08 kg/m²        Gen: NAD, vitals reviewed  MS: drowsy, briefly attends examiner, non verbal, does not follow commands  CN: blink to threat difficult, eyes closed forcibly, PERRL, no dysconjugate gaze or nystagmus seen, R facial  Motor: spontaneous mvmts on L, localizes on R  Sensory: intact to light touch all 4 ext.  Reflexes: b/l hyperreflexia patellas, upgoing toe on R       ROS:  No weakness, numbness  No fevers, chills      Laboratory results:  Lab Results   Component Value Date    GLUCOSE 144 (H) 10/29/2024    CALCIUM 8.4 (L) 10/29/2024     10/29/2024    K 4.1 10/29/2024    K 4.1 10/29/2024    CO2 28.6 10/29/2024     (H) 10/29/2024    BUN 43 (H) 10/29/2024    CREATININE 1.50 (H) 10/29/2024    EGFRIFAFRI 69 2022    EGFRIFNONA 60 2022    BCR 28.7 (H) 10/29/2024    ANIONGAP 8.4 10/29/2024     Lab Results   Component Value Date    WBC 14.60 (H) 10/28/2024    HGB 11.9 (L) 10/28/2024    HCT 36.9 10/28/2024    MCV 90.2 10/28/2024     (H) 10/28/2024     Lab Results   Component Value Date     (H) 10/23/2024    LDL  2023      Comment:        LDL cholesterol not calculated. Triglyceride levels  greater than 400 mg/dL invalidate calculated LDL results.    Reference range: <100    Desirable range <100 mg/dL for primary prevention;    <70 mg/dL for patients with CHD or diabetic patients   with > or = 2 CHD risk factors.    LDL-C is now calculated using the Narciso-Ortiz   calculation, " which is a validated novel method providing   better accuracy than the Friedewald equation in the   estimation of LDL-C.   Narciso SS et al. HIMA. 2013;310(19): 7163-7609   (http://education.[employer].CompanyLoop/faq/RRB600)    LDL  07/18/2023      Comment:        LDL cholesterol not calculated. Triglyceride levels  greater than 400 mg/dL invalidate calculated LDL results.    Reference range: <100    Desirable range <100 mg/dL for primary prevention;    <70 mg/dL for patients with CHD or diabetic patients   with > or = 2 CHD risk factors.    LDL-C is now calculated using the Narciso-Ortiz   calculation, which is a validated novel method providing   better accuracy than the Friedewald equation in the   estimation of LDL-C.   Narciso SS et al. HIMA. 2013;310(19): 4436-5323   (http://education.[employer]Loop/faq/HIC753)         Lab 10/23/24  0236   HEMOGLOBIN A1C 14.70*        Review of labs:     Review and interpretation of imaging:   MRI brain w/o 10/22     FINDINGS: There is a moderate to large area of restricted diffusion  within the left middle cerebral artery distribution. This involves the  posterior left frontal lobe and left parietal and occipital lobes  measuring approximately 8 cm in AP dimension by 3.9 cm transverse. There  are smaller foci of cortical and subcortical white matter restricted  diffusion within the posterior left frontal lobe also within the left  MCA distribution.. No right cerebral or cerebral hemisphere infarction  is evident.     There are small foci of gradient echo diminished signal within the left  MCA distribution infarction consistent with areas of petechial  hemorrhage without evidence for large hemorrhage. Ventricles appear  normal in size and configuration. The major intracranial flow voids  appear within normal limits.     Small chronic lacunar infarctions are present within the right corona  radiata and posterior head of the right caudate nucleus as well as  within the left  gloria.     IMPRESSION:  1. Moderate to large acute left middle cerebral artery distribution  infarction with involvement of the left posterior left frontal lobe,  left parietal lobe, left occipital lobe. Suspect small foci of petechial  hemorrhage associated with the infarction without evidence for large  hemorrhage or shift of the midline structures.  2. Small chronic lacunar infarctions within the right corona radiata,  right gloria, posterior right caudate head.     HCT 10/24  FINDINGS: Again identified is an ischemic infarct involving the left MCA  vascular distribution unchanged in area as compared to prior examination  including involvement of the lateral aspect of the left parietal and  occipital lobes with extension to the insular cortex and operculum. The  area of infarction measures approximately 10 cm in AP dimension. There  is continued dissipation of contrast staining. There is no evidence of  hemorrhage.     A new ischemic infarct is appreciated involving the medial aspect of the  left cerebellar hemisphere measuring 2.2 x 1.6 x 2.5 cm in size.     IMPRESSION:  There is involving a large ischemic infarct involving the  left MCA vascular distribution with dissipating contrast staining. A new  ischemic infarct involving the medial aspect of the left cerebellar  hemisphere is appreciated measuring approximately 2.2 x 1.6 x 2.5 cm in  size.     Workup to date:  CT head and neck not completed for agitation     Carotid Doppler less than 50% stenosis bilaterally     TTE    Left ventricular systolic function is severely decreased. Calculated left ventricular EF = 14.2%, visually estimated LVEF 20-25%.    There is global hypokinesis.  Additionally, the following left ventricular wall segments are akinetic: basal anterolateral, mid anterolateral, apical lateral, basal inferolateral, mid inferolateral and apex.    Left ventricular wall thickness is consistent with moderate concentric hypertrophy.    Left ventricular  mass index is increased.  May consider infiltrative cardiomyopathy in the appropriate clinical setting.    No obvious LV thrombus noted including on echo contrast images.    Mild-moderate mitral regurgitation.    Borderline left atrial enlargement, normal RA and IVC size.    There is a small (<1cm) pericardial effusion. There is no evidence of cardiac tamponade.    Workup to date:  TTE yesterday with 21-25% EF LVH, global hypkinesis, no obvious LV thrombus  Diagnoses:  Large L MCA stroke  STEMI  Med non compliance  PSA     Impression: 59-year-old female with past medical history of CAD, tobacco abuse, medication noncompliance, PSA, hypertension, hyperlipidemia, diabetes who presented on 10/22 with chest discomfort and dyspnea, was was admitted  with STEMI.  She was found to have multivessel disease and underwent balloon angioplasty and stent placement, EF about 10%..  She was very sleepy thereafter requiring flumazenil and Narcan.  She became agitated thereafter.  There was concern for left MCA syndrome, she received TNK and team D imaging revealed left M2 LVO and she underwent mechanical thrombectomy with TICI 2c flow     She has since moved out of the ICU.  Serial imaging showed stable large left MCA territory infarct and interval left cerebellar stroke.  She is on dapt presently.  CTA not done for patient agitation.  She had no hemodynamic carotid stenosis per Doppler.  TTE shows borderline left atrial enlargement, EF of 14%, LVH, increased left ventricular mass possibly cardiomyopathy no obvious LV thrombus.  On exam she is globally aphasic, blinks to threat bilaterally with withdrawal on the right     Neuro exam is stable, with full MCA syndrome, and requiring supplemental O2.  HCT overnight was reviewed and stable, no interval change. Discussed clinical scenario with  including suspected neuro prognosis.  He will come tomorrow to visit.  Neuro team happy to participate palliative discussions    Don't  hesitate to contact our team if questions      Thank you for this consultation.  Discussed above plan with neuro attending, Dr. Montalvo who agrees with above plan.  Neurology team is available for concerns or questions.

## 2024-10-29 NOTE — PROGRESS NOTES
[facility] Progress Note       Encounter Date:10/29/24  Patient:Albina Sosa  :1965  MRN:[id number]      Chief Complaint: Follow-up STEMI      Subjective:        Patient nonverbal no new neuro changes overnight into today      Review of Systems:  Review of Systems   Unable to perform ROS: Patient nonverbal       Medications:  Scheduled Meds:  aspirin, 81 mg, Nasogastric, Daily  atorvastatin, 40 mg, Oral, Nightly  castor oil-balsam peru, 1 Application, Topical, Q12H  chlorhexidine, 15 mL, Mouth/Throat, Q12H  clopidogrel, 75 mg, Nasogastric, Daily  enoxaparin, 30 mg, Subcutaneous, Nightly  famotidine, 20 mg, Nasogastric, Daily  furosemide, 60 mg, Intravenous, Q8H  insulin glargine, 30 Units, Subcutaneous, Q12H  insulin regular, 4-24 Units, Subcutaneous, Q4H  metoprolol tartrate, 12.5 mg, Oral, Q12H  venlafaxine, 37.5 mg, Nasogastric, BID    Continuous Infusions:  milrinone, 0.25 mcg/kg/min, Last Rate: 0.25 mcg/kg/min (10/28/24 1655)    PRN Meds:    acetaminophen    dextrose    dextrose    glucagon (human recombinant)    hydrALAZINE    HYDROcodone-acetaminophen    ipratropium-albuterol    ipratropium-albuterol    loperamide    nitroglycerin    OLANZapine    ondansetron ODT **OR** ondansetron    [COMPLETED] Insert Peripheral IV **AND** sodium chloride         Objective:       Vitals:    10/29/24 0000 10/29/24 0438 10/29/24 0749 10/29/24 1155   BP: 107/80 116/75 110/82 96/67   BP Location: Right arm Right arm Left arm Right arm   Patient Position: Lying Lying Lying Lying   Pulse: 103 92 95 88   Resp: 22 22 22 22   Temp:  98.4 °F (36.9 °C) 97.5 °F (36.4 °C) 97.9 °F (36.6 °C)   TempSrc:  Oral Oral Oral   SpO2: 93% 92% 94% 92%   Weight:  60.2 kg (132 lb 11.5 oz)     Height:               Physical Exam:  Constitutional: Chronically ill-appearing, frail, no acute distress   HENT: Oropharynx clear and membrane moist, NG in place  Eyes: Normal conjunctiva, no sclera icterus.  Neck: Supple, no carotid  bruit bilaterally.  Cardiovascular: Regular and Tachycardia rate and rhythm, No Murmur, No bilateral lower extremity edema.  Pulmonary: Normal respiratory effort, coarse lung sounds, no wheezing.  Abdominal: Soft, nontender, no hepatosplenomegaly, liver is non-pulsatile.  Neurological: Still fairly sleepy not following commands well does move L leg  Skin: Warm, dry, no ecchymosis, no rash.  Psych: Unable to assess.           Lab Review:   Results from last 7 days   Lab Units 10/29/24  0328 10/28/24  1445 10/28/24  0402 10/27/24  0742 10/26/24  2015 10/26/24  1025 10/26/24  0307 10/25/24  0404 10/24/24  1502 10/24/24  0333 10/22/24  2253 10/22/24  1939   SODIUM mmol/L 145  --  152* 150* 147*  --  148* 144  --  136   < > 129*   POTASSIUM mmol/L 4.1  4.1 3.9 3.9 4.0 4.0 3.7 3.3* 4.0   < > 3.6   < > 4.3   CHLORIDE mmol/L 108*  --  113* 110* 109*  --  110* 111*  --  100   < > 90*   CO2 mmol/L 28.6  --  28.1 29.0 28.2  --  26.3 23.7  --  24.8   < > 23.0   BUN mg/dL 43*  --  50* 53* 38*  --  34* 34*  --  30*   < > 22*   CREATININE mg/dL 1.50*  --  1.36* 1.60* 1.39*  --  1.38* 1.64*  --  1.63*   < > 1.47*   GLUCOSE mg/dL 144*  --  131* 197* 184*  --  168* 166*  --  178*   < > 699*   CALCIUM mg/dL 8.4*  --  8.5* 9.0 9.2  --  8.8 8.4*  --  9.4   < > 11.3*   AST (SGOT) U/L  --   --  62*  --  51*  --   --   --   --  58*  --  138*   ALT (SGPT) U/L  --   --  34*  --  38*  --   --   --   --  24  --  39*    < > = values in this interval not displayed.     Results from last 7 days   Lab Units 10/22/24  1646   HSTROP T ng/L >10,000*     Results from last 7 days   Lab Units 10/28/24  2353 10/28/24  0402 10/27/24  0527 10/26/24  0307 10/25/24  0404 10/24/24  0333 10/23/24  0236   WBC 10*3/mm3 14.60* 14.30* 12.28* 11.13* 10.98* 15.34* 14.22*   HEMOGLOBIN g/dL 11.9* 11.4* 12.4 12.4 11.1* 12.2 14.8   HEMATOCRIT % 36.9 34.3 38.9 36.6 34.0 36.5 43.0   PLATELETS 10*3/mm3 465* 395 486* 359 316 304 397     Results from last 7 days   Lab Units  10/23/24  0843 10/22/24  1722   APTT seconds 23.6 49.8*         Results from last 7 days   Lab Units 10/23/24  0236   CHOLESTEROL mg/dL 245*   TRIGLYCERIDES mg/dL 311*   HDL CHOL mg/dL 37*           Results from last 7 days   Lab Units 10/26/24  1025   TSH uIU/mL 0.349       Echocardiogram 10/23/2024:  Left ventricular systolic function is severely decreased. Calculated left ventricular EF = 14.2%, visually estimated LVEF 20-25%.  There is global hypokinesis.  Additionally, the following left ventricular wall segments are akinetic: basal anterolateral, mid anterolateral, apical lateral, basal inferolateral, mid inferolateral and apex.  Left ventricular wall thickness is consistent with moderate concentric hypertrophy.  Left ventricular mass index is increased.  May consider infiltrative cardiomyopathy in the appropriate clinical setting.  No obvious LV thrombus noted including on echo contrast images.  Mild-moderate mitral regurgitation.  Borderline left atrial enlargement, normal RA and IVC size.  There is a small (<1cm) pericardial effusion. There is no evidence of cardiac tamponade.    Cardiac Catheterization 10/22/2024:  Severe multivessel coronary artery disease with 100% ostial LAD, circumflex contains 100% stenosis of the mid marginal branch at the distal stent edge likely culprit for STEMI presentation, left-sided PDA from the circumflex has 100% proximal segment stenoses with left to left collaterals from a LIMA to LAD supplying the distal PDA, patent LIMA to LAD LAD is diffusely diseased small in caliber size with sequential 80% stenoses.  LVEDP of approximately 30 mmHg  Ejection fraction 10%  Successful PCI to mid marginal with placement of a 2.25 x 12 mm Xience drug-eluting stent deployed initially at 8 keon with her stent balloon back slightly postdilated to 16 at  [place] to right femoral artery  Patient developed acute neurologic changes at the end of the case when we went to load her with aspirin and  Plavix.  She was noted to be fairly unresponsive even after reversal with Narcan and flumazenil code stroke was called she was assessed emergently and taken off her head CT       Assessment:          Diagnosis Plan   1. Acute ST elevation myocardial infarction (STEMI), unspecified artery  Ambulatory Referral to Cardiac Rehab      2. Smoker        3. H/O medication noncompliance        4. Acute pulmonary edema               Plan:       Ms. Sosa is a 59 y.o. woman with past medical history notable for coronary disease status post LIMA to LAD percutaneous interventions to her circumflex and left-sided PDA, hypertension, mixed hyperlipidemia, diabetes type 2 on insulin therapy, history of stroke, history of methamphetamine use, and ongoing tobacco abuse with underlying lung disease who presented to the emergency room with a day or 2 of acute onset chest discomfort on 10/22 was noted to have ST changes concerning for STEMI.  She was taken emergently to the Cath Lab where she was found to have multivessel disease some chronic and on her mid marginal branch which fit with her EKG changes was felt to be the acute lesion and behaved so.  This was revascularized and stent placed.  She was also noted to have occlusion of her left dominant circumflex however this behave more chronic given that there was already collateralization and wire would not pass.  She was also found to have severe cardiomyopathy which is new compared to at least stress findings from 2 years ago.  Unfortunately patient had a stroke symptoms at the end of the case she was taken emergently had TNK and subsequently taken for thrombectomy despite that still had a fairly sizable stroke on the left side.  Patient was intubated for approximately 5 days after her infarct and stroke.  Unfortunately neurostatus has not improved much after extubation and still remains extremely sick from multiple fronts including her new cardiomyopathy and severe stroke.   Milrinone was started on 10/27 due to some concerns over her renal function being slightly worse it has been somewhat stable she is dealing with hyponatremia now which hopefully will improve with free water flushes.  Follow-up head CT demonstrates new ischemic stroke in the cerebellar region which is obviously concerning given the concern that this might have been a cardioembolic stroke potentially from a left ventricular thrombus in the left ventricle given her severely reduced ejection fraction which seems out of proportion to her infarct of the distal mid marginal branch.  Talked with  again today.  He is coming up tomorrow at around noon and ultrasound to be there to talk with him more he thinks he will pull through.  Obviously she is critically ill with many different major comorbidities some of which we are doing her best to try and improve and optimize but I think there is been some permanent damage from her stroke or heart attack and years of undertreated diabetes.  I will also try to reach out to his daughter as well just to keep her updated I have not been able to directly talk I left a message today on her voicemail.  Will try and talk with  tomorrow when he arrives very tough situation with poor prognosis        STEMI:  Mid marginal branch felt to be culprit however patient with severe chronic disease throughout the LAD and circumflex and left-sided PDA  Status post PCI 10/22  Was loaded with aspirin and clopidogrel in the Cath Lab 10/2022  Continue aspirin and clopidogrel  Continue low-dose metoprolol  Follow-up echocardiogram 10/28 does not show clear evidence of left ventricular.  Would not be a great candidate for anticoagulation at this juncture specially in light of high risk for hemorrhagic conversion of her stroke    Stroke:  Likely cardioembolic unclear if from left ventricular thrombus or clot on the catheter  Status post TNK and thrombectomy  Neurology following  Now on aspirin  and clopidogrel  Limited to no improvement neurologically  Would likely need a PEG tube at some point we will have to further discussions with family regarding goals of care    Acute systolic congestive heart failure:  Severely elevated LVEDP of 40 mmHg in the Cath Lab  Did receive diuretic in the Cath Lab but over the last few days been dealing with hypernatremia this is improved with free water flushes nephrology is following and recommending IV diuretics today which I agree with  Milrinone 0.25 mcg started 10/27 we will continue for now may try and wean after we diurese her little further    Diabetes type 2:   Elevated blood sugars noted but no evidence of acidosis think it is more of a hyperglycemia  Appreciate critical care assistance with managing her severe hyperglycemia     Acute hyponatremia:  Has improved and now hypernatremic treating with free water flushes appreciate renal insight             Bret Lawrence MD  [facility]  10/29/24  14:34 EDT

## 2024-10-29 NOTE — PROGRESS NOTES
I have attempted to contact patient daughter,  Ara several times today and unsuccessful. I was able to reach out to patient spouse and he will be here around noon tomorrow. I will meet with him at 1300. He does report patient doesn't have a living will and/or advance directive. He does inform me that he and the patient have been  for 31 year and Ara is his step daughter. If Ara returns call will inform her of time so that she can hopefully be present as well. I updated SUZY Machuca.

## 2024-10-29 NOTE — PLAN OF CARE
Problem: Restraint, Nonviolent  Goal: Absence of Harm or Injury  10/29/2024 1454 by Brigette Apodaca RN  Outcome: Progressing  10/29/2024 1347 by Brigette Apodaca RN  Outcome: Progressing  Intervention: Implement Least Restrictive Safety Strategies  Recent Flowsheet Documentation  Taken 10/29/2024 1200 by Brigette Apodaca RN  Medical Device Protection: tubing secured  Diversional Activities: television  Taken 10/29/2024 1000 by Brigette Apodaca RN  Medical Device Protection: tubing secured  Diversional Activities: television  Taken 10/29/2024 0920 by Brigette Apodaca RN  Medical Device Protection: tubing secured  Taken 10/29/2024 0800 by Brigette Apodaca RN  Medical Device Protection: tubing secured  Diversional Activities: television  Intervention: Protect Dignity, Rights and Personal Wellbeing  Recent Flowsheet Documentation  Taken 10/29/2024 0745 by Brigette Apodaca RN  Trust Relationship/Rapport:   care explained   reassurance provided  Intervention: Protect Skin and Joint Integrity  Recent Flowsheet Documentation  Taken 10/29/2024 0745 by Brigette Apodaca RN  Skin Protection:   incontinence pads utilized   transparent dressing maintained   Goal Outcome Evaluation:

## 2024-10-29 NOTE — PROGRESS NOTES
[facility] Progress Note      Patient Name: Albina Sosa  : 1965  MRN: [id number]  Primary Care Physician:  Germaine James APRN  Date of admission: 10/22/2024    Subjective     Interval History:   Follow-up acute kidney injury and hypernatremia.  Does not really respond to voice or touch.  Restless in bed.  Tachypneic.  No distress.  800 cc urine output.  Requiring intermittent straight cath.  Getting tube feeds with 100 cc/h free water.  Currently not on diuretic.  Weight up but really inconsistent.  Frequent loose cough.  Review of Systems:   As noted above    Objective     Vitals:   Temp:  [97.5 °F (36.4 °C)-98.9 °F (37.2 °C)] 97.5 °F (36.4 °C)  Heart Rate:  [] 95  Resp:  [16-24] 22  BP: (107-123)/(75-89) 110/82  Flow (L/min) (Oxygen Therapy):  [2-3] 3    Intake/Output Summary (Last 24 hours) at 10/29/2024 1003  Last data filed at 10/29/2024 0637  Gross per 24 hour   Intake 2784 ml   Output 800 ml   Net 1984 ml       Physical Exam:    General Appearance: Restless.  Tachypneic.  Not in distress.  Does not open her eyes to voice or try to make eye contact.  Skin: warm and dry  HEENT: oral mucosa dry.  Nasal oxygen.  Core track in place., nonicteric sclera  Neck: supple, no JVD  Lungs: Coarse upper airway rhonchi.  Heart: RRR, normal S1 and S2  Abdomen: soft, nontender, nondistended.  Positive bowel sounds.  Body wall edema.  : no palpable bladder  Extremities: no edema, cyanosis or clubbing  Neuro: normal speech and mental status     Scheduled Meds:     aspirin, 81 mg, Nasogastric, Daily  atorvastatin, 40 mg, Oral, Nightly  castor oil-balsam peru, 1 Application, Topical, Q12H  chlorhexidine, 15 mL, Mouth/Throat, Q12H  clopidogrel, 75 mg, Nasogastric, Daily  enoxaparin, 30 mg, Subcutaneous, Nightly  famotidine, 20 mg, Nasogastric, Daily  insulin glargine, 30 Units, Subcutaneous, Q12H  insulin regular, 4-24 Units, Subcutaneous, Q4H  metoprolol tartrate, 12.5 mg,  Oral, Q12H  venlafaxine, 37.5 mg, Nasogastric, BID      IV Meds:   milrinone, 0.25 mcg/kg/min, Last Rate: 0.25 mcg/kg/min (10/28/24 1655)        Results Reviewed:   I have personally reviewed the results from the time of this admission to 10/29/2024 10:03 EDT     Results from last 7 days   Lab Units 10/29/24  0328 10/28/24  1445 10/28/24  0402 10/27/24  0742 10/26/24  2015 10/24/24  1502 10/24/24  0333   SODIUM mmol/L 145  --  152* 150* 147*   < > 136   POTASSIUM mmol/L 4.1  4.1 3.9 3.9 4.0 4.0   < > 3.6   CHLORIDE mmol/L 108*  --  113* 110* 109*   < > 100   CO2 mmol/L 28.6  --  28.1 29.0 28.2   < > 24.8   BUN mg/dL 43*  --  50* 53* 38*   < > 30*   CREATININE mg/dL 1.50*  --  1.36* 1.60* 1.39*   < > 1.63*   CALCIUM mg/dL 8.4*  --  8.5* 9.0 9.2   < > 9.4   BILIRUBIN mg/dL  --   --  0.3  --  0.3  --  0.4   ALK PHOS U/L  --   --  159*  --  159*  --  115   ALT (SGPT) U/L  --   --  34*  --  38*  --  24   AST (SGOT) U/L  --   --  62*  --  51*  --  58*   GLUCOSE mg/dL 144*  --  131* 197* 184*   < > 178*    < > = values in this interval not displayed.       Estimated Creatinine Clearance: 34.1 mL/min (A) (by C-G formula based on SCr of 1.5 mg/dL (H)).    Results from last 7 days   Lab Units 10/29/24  0328 10/28/24  0402 10/27/24  1847   PHOSPHORUS mg/dL 3.0 3.6 3.6       Results from last 7 days   Lab Units 10/28/24  0402   URIC ACID mg/dL 7.7*       Results from last 7 days   Lab Units 10/28/24  2353 10/28/24  0402 10/27/24  0527 10/26/24  0307 10/25/24  0404   WBC 10*3/mm3 14.60* 14.30* 12.28* 11.13* 10.98*   HEMOGLOBIN g/dL 11.9* 11.4* 12.4 12.4 11.1*   PLATELETS 10*3/mm3 465* 395 486* 359 316       Results from last 7 days   Lab Units 10/22/24  1150   INR  1.02       Assessment / Plan     ASSESSMENT:  Acute kidney injury and hypernatremia.  Waste products up some today.  Likely cardiorenal.  Sodium better with increase in free water but looks and sounds volume overloaded.  ST elevation MI.  Severe heart failure reduced  ejection fraction with calculated EF 14%.  Underwent heart cath 10/22/2024.  Status post mid marginal branch PCI and stent.  Severe chronic disease through LAD,  circumflex.   Post cardiac cath left MCA CVA.  Interval left cerebellar stroke.  Status post TNK and thrombectomy.  4.  Diabetes mellitus type 2  5.  Sacral pressure injury deep tissue coccyx wound.  PLAN:  Palliative care appropriate.   2.  IV diuretic today.  Thank you for involving us in the care of Albina Sosa.  Please feel free to call with any questions.    Aline Vital MD  10/29/24  10:03 EDT    [facility]  [phone]    Please note that portions of this note were completed with a voice recognition program.

## 2024-10-29 NOTE — SIGNIFICANT NOTE
10/29/24 0951   OTHER   Discipline occupational therapist   Rehab Time/Intention   Session Not Performed other (see comments)  (Spoke with RN.  Awaiting pallitive consult, not appropriate for OT today.  Will follow-up tomorrow.)   Therapy Assessment/Plan (PT)   Criteria for Skilled Interventions Met (PT) yes   Recommendation   OT - Next Appointment 10/30/24

## 2024-10-29 NOTE — PLAN OF CARE
Problem: Adult Inpatient Plan of Care  Goal: Plan of Care Review  Outcome: Not Progressing  Goal: Patient-Specific Goal (Individualized)  Outcome: Not Progressing  Goal: Absence of Hospital-Acquired Illness or Injury  Outcome: Not Progressing  Intervention: Identify and Manage Fall Risk  Recent Flowsheet Documentation  Taken 10/29/2024 0157 by Torri Harmon RN  Safety Promotion/Fall Prevention:   activity supervised   assistive device/personal items within reach   clutter free environment maintained   fall prevention program maintained   room organization consistent   safety round/check completed  Taken 10/29/2024 0011 by Torri Harmon RN  Safety Promotion/Fall Prevention:   activity supervised   assistive device/personal items within reach   clutter free environment maintained   fall prevention program maintained   room organization consistent   safety round/check completed  Taken 10/28/2024 2204 by Torri Harmon RN  Safety Promotion/Fall Prevention:   activity supervised   assistive device/personal items within reach   clutter free environment maintained   fall prevention program maintained   room organization consistent   safety round/check completed  Taken 10/28/2024 2006 by Torri Harmon RN  Safety Promotion/Fall Prevention:   activity supervised   assistive device/personal items within reach   clutter free environment maintained   fall prevention program maintained   room organization consistent   safety round/check completed  Intervention: Prevent Skin Injury  Recent Flowsheet Documentation  Taken 10/29/2024 0157 by Torri Harmon RN  Body Position:   weight shifting   tilted   left  Taken 10/29/2024 0011 by Torri Harmon RN  Body Position:   weight shifting   tilted   right  Taken 10/28/2024 2204 by Torri Harmon RN  Body Position:   weight shifting   supine  Taken 10/28/2024 2006 by Torri Harmon RN  Body Position:   weight shifting    supine  Intervention: Prevent and Manage VTE (Venous Thromboembolism) Risk  Recent Flowsheet Documentation  Taken 10/28/2024 2204 by Torri Harmon RN  VTE Prevention/Management:   bilateral   SCDs (sequential compression devices) on  Goal: Optimal Comfort and Wellbeing  Outcome: Not Progressing  Intervention: Provide Person-Centered Care  Recent Flowsheet Documentation  Taken 10/28/2024 2204 by Torri Harmon RN  Trust Relationship/Rapport: care explained  Goal: Readiness for Transition of Care  Outcome: Not Progressing     Problem: Restraint, Nonviolent  Goal: Absence of Harm or Injury  Outcome: Progressing  Intervention: Implement Least Restrictive Safety Strategies  Recent Flowsheet Documentation  Taken 10/29/2024 0400 by Torri Harmon RN  Medical Device Protection: tubing secured  Diversional Activities: television  Taken 10/29/2024 0200 by Torri Harmon RN  Medical Device Protection: tubing secured  Diversional Activities: television  Taken 10/29/2024 0000 by Torri Harmon RN  Medical Device Protection: tubing secured  Diversional Activities: television  Taken 10/28/2024 2204 by Torri Harmon RN  Medical Device Protection:   IV pole/bag removed from visual field   tubing secured  Diversional Activities: television  Taken 10/28/2024 2200 by Torri Harmon RN  Medical Device Protection: tubing secured  Diversional Activities: television  Taken 10/28/2024 2000 by Torri Harmon RN  Medical Device Protection: tubing secured  Diversional Activities: television  Intervention: Protect Dignity, Rights and Personal Wellbeing  Recent Flowsheet Documentation  Taken 10/28/2024 2204 by Torri Harmon RN  Trust Relationship/Rapport: care explained  Intervention: Protect Skin and Joint Integrity  Recent Flowsheet Documentation  Taken 10/29/2024 0157 by Torri Harmon RN  Body Position:   weight shifting   tilted   left  Taken 10/29/2024 0011  by Torri Harmon, RN  Body Position:   weight shifting   tilted   right  Taken 10/28/2024 2204 by Torri Harmon, RN  Body Position:   weight shifting   supine  Taken 10/28/2024 2006 by Torri Harmon, RN  Body Position:   weight shifting   supine   Goal Outcome Evaluation:

## 2024-10-29 NOTE — CONSULTS
.            [facility]    Palliative Care Initial Consult   Attending Physician: Rajat Mathews MD  Referring Provider: Dr Mathews    Reason for Referral: assistance with clarification of goals of care  Family/Support: spouse (Caesar) and daughter (Ara)     Code Status and Medical Interventions: CPR (Attempt to Resuscitate); Full Support   Ordered at: 10/22/24 1317     Level Of Support Discussed With:    Patient     Code Status (Patient has no pulse and is not breathing):    CPR (Attempt to Resuscitate)     Medical Interventions (Patient has pulse or is breathing):    Full Support     Goals of Care: Their goal for patient is recovery and go to  daughter's home with support of home health.     HPI:   59 y.o. female with history of coronary disease status post LIMA to LAD percutaneous interventions to her circumflex and left-sided PDA, hypertension, mixed hyperlipidemia, diabetes type 2 on insulin therapy, history of stroke, history of methamphetamine use, and ongoing tobacco abuse with underlying lung disease. She resided at home prior to hospital admission.   Patient presented to [facility] on 10/22/2024 related to chest discomfort. Workup in ER, revealed STEMI and she was hypoxic requiring oxygen. She went emergently to cath lab where she was found to have multivessel disease  secondary to acute marginal branch stenosis. She underwent balloon angioplasty and stent placement. She was noted have an EF of around 10%. Immediately after procedure she became somnolent, which was reversed with flumazenil and Narcan. Afterwards, she was agitated and their was concerns abut her neurological status (right hemiplegia, right facial droop, and mixed aphasia) and a CODE D was called. There was concerns for left  MCA syndrome. She was given TNK. Her hospital course was complicated with acute respiratory failure requiring invasive mechanical ventilation. She was  extubated on 10/25/2024. In addition, acute kidney injury and dysphagia. She currently has coretrak and discussion has been had about PEG placement. The palliative care team was consulted for support with goals of care conversation due to acute conditions and overall poor prognosis.   Palliative Care Spoke With: family  Quality of life: poor   Functional Status:  Unable to obtain patient information d/t aphasia. Pt supine in bed at start of session, soft wrist restrain on LUE, Flaccid RUE. Today, patient performed bed mobility with min/modA. Pt was not following directions, unsafe to attempt transfer or standing this session. Per PT notes on 10/26/2024  Due to the Palliative Care Topics Discussed: palliative care, goals of care, care options, resuscitation status, [facility] scattered bed status, and discharge options we will establish an advance care plan.   Advance Care Planning   Advance Care Planning Discussion:  see below          Review of Systems   Unable to perform ROS: Mental status change       1- Pain Assessment  Nonverbal Indicators of Pain: nonverbal indicators absent  CPOT Facial Expression: 0-->relaxed, neutral  CPOT Body Movements: 0-->absence of movements  CPOT Muscle Tension: 0-->relaxed  Ventilator Compliance/Vocalization: 0-->talking in normal tone or no sound  CPOT Score: 0  PAINAD Breathin-->normal  PAINAD Negative Vocalization: 0-->none  PAINAD Facial Expression: 0-->smiling or inexpressive  PAINAD Body Language: 0-->relaxed  PAINAD Consolability: 0-->no need to console  PAINAD Score: 0  Pain Location: chest    History reviewed. No pertinent past medical history.  Past Surgical History:   Procedure Laterality Date    CARDIAC CATHETERIZATION N/A 10/22/2024    Procedure: Left Heart Cath;  Surgeon: Bret Lawrence MD;  Location: [facility] CATH INVASIVE LOCATION;  Service: Cardiovascular;  Laterality: N/A;    CARDIAC CATHETERIZATION N/A 10/22/2024    Procedure: Stent LAM coronary;   Surgeon: Bret Lawrence MD;  Location: [facility] CATH INVASIVE LOCATION;  Service: Cardiovascular;  Laterality: N/A;    CARDIAC CATHETERIZATION N/A 10/22/2024    Procedure: Percutaneous Coronary Intervention;  Surgeon: Bret Lawrence MD;  [facility];  Service: Cardiovascular;  Laterality: N/A;    CARDIAC CATHETERIZATION N/A 10/22/2024    Procedure: Coronary angiography;  Surgeon: Bret Lawrence MD;  [facility];  Service: Cardiovascular;  Laterality: N/A;    EMBOLECTOMY N/A 10/22/2024    Procedure: EMBOLECTOMY MECHANICAL;  Surgeon: Jeremiah Carey MD;  Location: [facility] HYBRID OR;  Service: Interventional Radiology;  Laterality: N/A;     Social History     Socioeconomic History    Marital status:        Current Facility-Administered Medications   Medication Dose Route Frequency Provider Last Rate Last Admin    acetaminophen (TYLENOL) tablet 650 mg  650 mg Nasogastric Q4H PRN Alek Bauer MD   650 mg at 10/24/24 2057    aspirin chewable tablet 81 mg  81 mg Nasogastric Daily Alek Bauer MD   81 mg at 10/29/24 0854    atorvastatin (LIPITOR) tablet 40 mg  40 mg Oral Nightly Alek Bauer MD   40 mg at 10/28/24 2134    castor oil-balsam peru (VENELEX) ointment 1 Application  1 Application Topical Q12H Rajat Mathews MD   1 Application at 10/29/24 0921    chlorhexidine (PERIDEX) 0.12 % solution 15 mL  15 mL Mouth/Throat Q12H Alek Bauer MD   15 mL at 10/29/24 0920    clopidogrel (PLAVIX) tablet 75 mg  75 mg Nasogastric Daily Alek Bauer MD   75 mg at 10/29/24 0854    dextrose (D50W) (25 g/50 mL) IV injection 25 g  25 g Intravenous Q15 Min PRN Alek Bauer MD        dextrose (GLUTOSE) oral gel 15 g  15 g Oral Q15 Min PRN Alek Bauer MD        Enoxaparin Sodium (LOVENOX) syringe 30 mg  30 mg Subcutaneous Nightly Alek Bauer MD   30 mg at 10/28/24 2134    famotidine (PEPCID) tablet 20 mg  20 mg Nasogastric Daily Juancarlos,  Alek GARCIA MD   20 mg at 10/29/24 0853    furosemide (LASIX) injection 60 mg  60 mg Intravenous Q8H Aline Vital MD   60 mg at 10/29/24 1030    glucagon (GLUCAGEN) injection 1 mg  1 mg Intramuscular Q15 Min PRN Alek Bauer MD        hydrALAZINE (APRESOLINE) injection 10 mg  10 mg Intravenous Q4H PRN Alek Bauer MD        HYDROcodone-acetaminophen (NORCO) 5-325 MG per tablet 1 tablet  1 tablet Nasogastric Q4H PRN Alek Bauer MD   1 tablet at 10/27/24 0807    insulin glargine (LANTUS, SEMGLEE) injection 30 Units  30 Units Subcutaneous Q12H Alek Bauer MD   30 Units at 10/29/24 0854    insulin regular (humuLIN R,novoLIN R) injection 4-24 Units  4-24 Units Subcutaneous Q4H Alek Bauer MD   4 Units at 10/28/24 1631    ipratropium-albuterol (DUO-NEB) nebulizer solution 3 mL  3 mL Nebulization Q1H PRN Alek Bauer MD        ipratropium-albuterol (DUO-NEB) nebulizer solution 3 mL  3 mL Nebulization Q4H PRN Rajat Mathews MD        loperamide (IMODIUM) capsule 2 mg  2 mg Oral 4x Daily PRN Rajat Mathews MD   2 mg at 10/27/24 1846    metoprolol tartrate (LOPRESSOR) tablet 12.5 mg  12.5 mg Oral Q12H Alek Bauer MD   12.5 mg at 10/29/24 0854    milrinone (PRIMACOR) 20 mg in 100 mL D5W infusion  0.25 mcg/kg/min Intravenous Titrated Bob Ortiz MD 3.68 mL/hr at 10/28/24 1655 0.25 mcg/kg/min at 10/28/24 1655    nitroglycerin (NITROSTAT) SL tablet 0.4 mg  0.4 mg Sublingual Q5 Min PRN Alek Bauer MD        OLANZapine (zyPREXA) injection 10 mg  10 mg Intramuscular Once PRN Alek Bauer MD        ondansetron ODT (ZOFRAN-ODT) disintegrating tablet 4 mg  4 mg Oral Q6H PRN Alek Bauer MD        Or    ondansetron (ZOFRAN) injection 4 mg  4 mg Intravenous Q6H PRN Alek Bauer MD   4 mg at 10/28/24 2204    sodium chloride 0.9 % flush 10 mL  10 mL Intravenous PRN Alek Bauer MD        venlafaxine (EFFEXOR) tablet 37.5 mg  37.5 mg Nasogastric BID Alek Bauer MD   37.5 mg  "at 10/29/24 0853     milrinone, 0.25 mcg/kg/min, Last Rate: 0.25 mcg/kg/min (10/28/24 9265)        acetaminophen    dextrose    dextrose    glucagon (human recombinant)    hydrALAZINE    HYDROcodone-acetaminophen    ipratropium-albuterol    ipratropium-albuterol    loperamide    nitroglycerin    OLANZapine    ondansetron ODT **OR** ondansetron    [COMPLETED] Insert Peripheral IV **AND** sodium chloride    Allergies   Allergen Reactions    Bactrim [Sulfamethoxazole-Trimethoprim] Hives    Cefaclor Hives    Flexeril [Cyclobenzaprine] Hives    Robaxin [Methocarbamol] Unknown - High Severity     Attest that current medications reviewed  including but not limited to prescriptions, over-the counter, herbals and vitamin/mineral/dietary (nutritional) supplements for name, route of administration, type, dose and frequency and are current using all immediate resources available at time of dictation.      Intake/Output Summary (Last 24 hours) at 10/29/2024 1139  Last data filed at 10/29/2024 0637  Gross per 24 hour   Intake 2306 ml   Output 800 ml   Net 1506 ml       Physical Exam:    Diagnostics: Reviewed  /82 (BP Location: Left arm, Patient Position: Lying)   Pulse 95   Temp 97.5 °F (36.4 °C) (Oral)   Resp 22   Ht 154.9 cm (61\")   Wt 60.2 kg (132 lb 11.5 oz)   SpO2 94%   BMI 25.08 kg/m²     Constitutional:       General: Sleeping.      Appearance: Acutely ill-appearing.      Interventions: Restrained. Nasal cannula in place.      Comments: Soft wrist restraint to left   NC @ 3 liters   Pulmonary:      Effort: Pulmonary effort is normal. No accessory muscle usage.      Breath sounds: Examination of the right-lower field reveals decreased breath sounds. Examination of the left-lower field reveals decreased breath sounds. Decreased breath sounds present.   Cardiovascular:      PMI at left midclavicular line. Normal rate.   Edema:     Peripheral edema absent.   Abdominal:      General: Bowel sounds are normal.      " Palpations: Abdomen is soft.      Tenderness: There is no abdominal tenderness.      Comments: NGT right nare   Neurological:      Mental Status: Unresponsive.      Motor: Weakness (right sided) present.      Comments: Not following commands   Psychiatric:         Speech: Noncommunicative.       Patient status: Disease state: Deteriorating despite treatments.  Functional status: Palliative Performance Scale Score: Performance 20% based on the following measures: Ambulation: Totally bed bound, Activity and Evidence of Disease: Unable to do any work, extensive evidence of disease, Self-Care: Total care required,  Intake: Minimal sips, LOC: Full, drowsy or confusion   Nutritional status: Albumin 2.6 on 10/29/2024 Body mass index is 25.08 kg/m².   Family support: The patient receives support from her  and daughter..  Advance Directives:  the patient doesn't have an advance directive   POA/Healthcare surrogate-n/a.        Impression/Problem List:    STEMI s/p balloon angioplasty and stent placement  Ischemic cardiomyopathy   Large left MCA stroke     Coronary artery disease   Acute hypoxic respiratory failure   COPD  Uncontrolled diabetes mellitus type II    Recommendations/Plan:  1. Provide support with goals of care  2. FULL code   3. General surgery to evaluate for PEG         2.  Palliative care encounter  10/29/2024- The patient is unable to participate in goals of care conversation due to impaired cognition. I called her daughter, Ara at 1138 and no answer. I was able to leave a generic voicemail for her to return call.  I called patient daughter Ara again at 1441 and no answer. I was unable to leave a voicemail as her mailbox was full. I did call patient spouse, Caesar at 1446. He plans to visit tomorrow so we will meet in person at 1300.       10/30/2024- The patient remains unable to participate in conversation due to impaired cognition. I was due to meet with patient spouse, Caesar in person,  "however he had transportation issues. I did a telephone conference with Caesar and patient daughter, Ara. They provided me with detail history of patient prior to hospital. She was independent and residing in home setting. We reviewed patient hospital course and reviewed her conditions-NSTEMI, stroke, cardiomyopathy, dysphagia, poor prognosis. They were informed of her overall poor long term prognosis and uncertain of neuro recovery. We spent some time discussing artifical nutrition. We discussed PEG placement, risk vs benefits and they wish to proceed with PEG placement. Apparently they had recent family member have major stroke and she was recovered with time. At this time, they are \"hoping for the best and preparing for the worst.\" They feel they have to at least give her a chance to recover. We explored discharge options as well-rehab, long term care, home. At this time, they desire for patient to go to Ara home with home health services, where she will care for her full time. 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 We did discuss CODE status and medical interventions and reviewed risk vs benefits of interventions and they desire to have her remain FULL Code. They are also requesting to talk with neurology and I have asked RN to arrange it. I did provide them with information regarding palliative care, [facility] and [facility], however they are NOT interested at this time.Cultural and spiritual needs have been assessed and no interventions warranted.  They were appreciative of conversation and support provided. I will sign off and see PRN. I provided update to SUZY Trotter and SHENG Wade.       Thank you for this consult and allowing us to participate in patient's plan of care. Palliative Care Team will sign off and see PRN.     Time spent:45 minutes spent reviewing medical and medication records, assessing and examining patient, discussing with family, answering questions, " providing some guidance about a plan and documentation of care, and coordinating care with other healthcare members, with > 50% time spent face to face.   60 additional minutes spent on advance care planning, goals of care and code status discussions.     Ana Miranda, APRN  10/29/2024  11:39 EDT

## 2024-10-29 NOTE — SIGNIFICANT NOTE
10/29/24 0940   OTHER   Discipline physical therapist   Rehab Time/Intention   Session Not Performed other (see comments)  (pt still not appropriate at this time, awaiting palliative consult. will follow up)   Recommendation   PT - Next Appointment 10/30/24

## 2024-10-29 NOTE — PROGRESS NOTES
Name: Albina Sosa ADMIT: 10/22/2024   : 1965  PCP: Germaine James APRN    MRN: [id number] LOS: 7 days   AGE/SEX: 59 y.o. female  ROOM: [facility]     Subjective   Subjective   Patient is seen at bedside, no new complaints.       Objective   Objective   Vital Signs  Temp:  [97.5 °F (36.4 °C)-98.9 °F (37.2 °C)] 97.9 °F (36.6 °C)  Heart Rate:  [] 88  Resp:  [16-24] 22  BP: ()/(67-89) 96/67  SpO2:  [92 %-94 %] 92 %  on  Flow (L/min) (Oxygen Therapy):  [3] 3;   Device (Oxygen Therapy): nasal cannula  Body mass index is 25.08 kg/m².  Physical Exam  Vitals and nursing note reviewed.   Constitutional:       Appearance: She is ill-appearing (Chronically ill-appearing).   Eyes:      General: No scleral icterus.     Conjunctiva/sclera: Conjunctivae normal.      Pupils: Pupils are equal, round, and reactive to light.   Cardiovascular:      Rate and Rhythm: Regular rhythm. Tachycardia present.      Pulses: Normal pulses.      Heart sounds: Normal heart sounds.   Pulmonary:      Effort: Pulmonary effort is normal. No respiratory distress.      Breath sounds: Normal breath sounds.   Abdominal:      General: Bowel sounds are normal. There is no distension.      Palpations: Abdomen is soft.      Tenderness: There is no abdominal tenderness.   Musculoskeletal:      Right lower leg: No edema.      Left lower leg: No edema.   Skin:     General: Skin is warm and dry.   Neurological:      Mental Status: She is alert.      Comments: Right hemiparesis, spontaneously moves left upper and lower extremity. Nonverbal. Right facial droop noted.             Copied text material from yesterday's note has been reviewed for appropriate changes and remains accurate as of 10/29/24.      Results Review     I reviewed the patient's new clinical results.  Results from last 7 days   Lab Units 10/28/24  2353 10/28/24  0402 10/27/24  0527 10/26/24  0307   WBC 10*3/mm3 14.60* 14.30* 12.28* 11.13*   HEMOGLOBIN g/dL 11.9* 11.4* 12.4  12.4   PLATELETS 10*3/mm3 465* 395 486* 359     Results from last 7 days   Lab Units 10/29/24  0328 10/28/24  1445 10/28/24  0402 10/27/24  0742 10/26/24  2015   SODIUM mmol/L 145  --  152* 150* 147*   POTASSIUM mmol/L 4.1  4.1 3.9 3.9 4.0 4.0   CHLORIDE mmol/L 108*  --  113* 110* 109*   CO2 mmol/L 28.6  --  28.1 29.0 28.2   BUN mg/dL 43*  --  50* 53* 38*   CREATININE mg/dL 1.50*  --  1.36* 1.60* 1.39*   GLUCOSE mg/dL 144*  --  131* 197* 184*   EGFR mL/min/1.73 40.2*  --  45.2* 37.2* 44.1*     Results from last 7 days   Lab Units 10/29/24  0328 10/28/24  0402 10/27/24  0742 10/26/24  2015 10/25/24  0404 10/24/24  0333 10/22/24  1939   ALBUMIN g/dL 2.6* 2.8* 3.0* 3.0*   < > 2.8* 3.2*   BILIRUBIN mg/dL  --  0.3  --  0.3  --  0.4 0.7   ALK PHOS U/L  --  159*  --  159*  --  115 153*   AST (SGOT) U/L  --  62*  --  51*  --  58* 138*   ALT (SGPT) U/L  --  34*  --  38*  --  24 39*    < > = values in this interval not displayed.     Results from last 7 days   Lab Units 10/29/24  0328 10/28/24  0402 10/27/24  1847 10/27/24  0742 10/26/24  2015   CALCIUM mg/dL 8.4* 8.5*  --  9.0 9.2   ALBUMIN g/dL 2.6* 2.8*  --  3.0* 3.0*   PHOSPHORUS mg/dL 3.0 3.6 3.6 1.9*  --      Results from last 7 days   Lab Units 10/23/24  1134 10/23/24  0843 10/23/24  0236 10/22/24  2251 10/22/24  1939   PROCALCITONIN ng/mL 0.41*  --  0.40*  --   --    LACTATE mmol/L  --  2.4* 3.5* 5.1* 5.4*     Glucose   Date/Time Value Ref Range Status   10/29/2024 1155 154 (H) 70 - 130 mg/dL Final   10/29/2024 0747 132 (H) 70 - 130 mg/dL Final   10/29/2024 0440 134 (H) 70 - 130 mg/dL Final   10/29/2024 0001 113 70 - 130 mg/dL Final   10/28/2024 2016 148 (H) 70 - 130 mg/dL Final   10/28/2024 1618 173 (H) 70 - 130 mg/dL Final   10/28/2024 1147 139 (H) 70 - 130 mg/dL Final       CT Head Without Contrast    Result Date: 10/29/2024  Large area of evolving infarction within the left MCA territory is again noted. There is an additional area of evolving infarction within  the left cerebellar hemisphere. Both of these areas appear stable in size. There is some mild gyriform increased attenuation which is noted within the left MCA territory infarct, favored to represent some mild petechial hemorrhagic transformation. No large intraparenchymal hematoma is seen.  Radiation dose reduction techniques were utilized, including automated exposure control and exposure modulation based on body size.   This report was finalized on 10/29/2024 5:30 AM by Dr. Angélica Gallo M.D on Workstation: [id number]      XR Chest 1 View    Result Date: 10/29/2024  Significant interval worsening in bilateral alveolar and interstitial infiltrates, suggesting increasing edema.  This report was finalized on 10/29/2024 12:54 AM by Dr. Angélica Gallo M.D on Workstation: [id number]       I have personally reviewed all medications:  Scheduled Medications  aspirin, 81 mg, Nasogastric, Daily  atorvastatin, 40 mg, Oral, Nightly  castor oil-balsam peru, 1 Application, Topical, Q12H  chlorhexidine, 15 mL, Mouth/Throat, Q12H  clopidogrel, 75 mg, Nasogastric, Daily  enoxaparin, 30 mg, Subcutaneous, Nightly  famotidine, 20 mg, Nasogastric, Daily  furosemide, 60 mg, Intravenous, Q8H  insulin glargine, 30 Units, Subcutaneous, Q12H  insulin regular, 4-24 Units, Subcutaneous, Q4H  metoprolol tartrate, 12.5 mg, Oral, Q12H  venlafaxine, 37.5 mg, Nasogastric, BID    Infusions  milrinone, 0.25 mcg/kg/min, Last Rate: 0.25 mcg/kg/min (10/28/24 1655)    Diet  NPO Diet NPO Type: Tube Feeding    I have personally reviewed:  [x]  Laboratory   [x]  Microbiology   [x]  Radiology   [x]  EKG/Telemetry  [x]  Cardiology/Vascular   []  Pathology    []  Records       Assessment/Plan     Active Hospital Problems    Diagnosis  POA    **STEMI involving left circumflex coronary artery [I21.21]  Yes    Hyperlipemia [E78.5]  Unknown    Acute hypoxic respiratory failure [J96.01]  Unknown    COPD (chronic obstructive pulmonary disease) [J44.9]   Unknown    Type 2 diabetes mellitus with hyperglycemia [E11.65]  Unknown    Ischemic cardiomyopathy [I25.5]  Unknown    Elevated liver enzymes [R74.8]  Unknown    Polysubstance abuse [F19.10]  Unknown    Peripheral vascular disease [I73.9]  Unknown    Acute ischemic left middle cerebral artery (MCA) stroke [I63.512]  No      Resolved Hospital Problems   No resolved problems to display.       59 y.o. female admitted with STEMI involving left circumflex coronary artery.    59 y.o. female  admitted to [facility] via EMS 10/22/24 for STEMI. She has history of coronary artery disease with CAB with LIMA to the LAD, with hx of PCI to circumflex and left PDA, hypertension, mixed hyperlipidemia, type 2 diabetes on insulin therapy, prior CVA, history of methamphetamine use, and ongoing tobacco use with underlying lung disease.  She underwent emergent coronary catheterization and was found to have multivessel disease with severe cardiomyopathy and had a PCI with stent to culprit distal marginal branch with residual diffuse LAD and PDA disease with collaterals.  EF 14% by echo without obvious thrombus.  Post PCI she was noted to have increased agitation and right sided weakness with aphasia and stat neurology consult was placed and team D was activated.  CT of the head showed no acute hemorrhage or hypodensity, CTA of the head and neck was not performed at that time due to agitation.  Patient was given TNK and sent for angiogram out of concern for left MCA disease and had mechanical cranial artery embolectomy.          Acute left MCA ischemic stroke  - status post TNK and embolectomy  - Neurology following.  Repeat head imaging negative for hemorrhagic conversion per nephrology  -Remains on dual antiplatelet therapy and statin therapy  -Neurology is recommending BP systolic 140 or less  with strict blood pressure control to prevent hemorrhagic transformation  -Neurology has okayed use of Lovenox for DVT prophylaxis  -Continue  therapies PT and OT  -LDL goal less than 70     Acute STEMI status post primary PCI/stent / ischemic cardiomyopathy  -Cardiac cath with multivessel disease with PCI/stent to mid marginal branch  -EF 14% by echo without clear evidence of left ventricular thrombus  -Cardiology following  -Currently on aspirin and Plavix  -History of prior CAB.   - cath with residual with diffuse LAD and PDA disease with collaterals.   -Was noted to have severely elevated left ventricular end-diastolic pressures in the Cath Lab and she was given IV diuretics with improvement in her chest x-ray imaging.  -Will need to monitor volume status closely.     Hyperlipidemia poorly controlled / hypertriglyceridemia  -Currently on atorvastatin  -Noted total cholesterol back in 2023 was 403 with an LDL incalculable with triglycerides over a thousand.  -Admission lipid panel does show total cholesterol of 245, HDL 37 , triglycerides 311.  LDL HDL ratio is 3.94  - goal LDL less than 70  -Certainly her poorly controlled diabetes is contributing to her elevated triglycerides and subsequent elevated LDL.  Glycemic control is needed.  Unsure regarding adherence to medical therapies as outpt.    - 10/26 TSH check normal.      Acute hypoxic respiratory failure/COPD  -Initially requiring ventilator support . Liberated from ventilator 10/25 now on 3 L nasal cannula  -Pulmonary is following  -Continue nebulizer with good pulmonary hygiene.      Type 2 diabetes, uncontrolled hyperglycemia   -A1c on admit 14.7  -Sugars over 600 on admission  -unclear on her adherence and DM regimen as an outpatient  -Currently on 30 units Lantus every 12.  Last dose adjustment 10/26 PM dosing and has only had 2 doses thus far of 30 units.    - FSBS trends improved  with mostly under 200.  Will give full 24 hours at current dosing before further adjustment.  Trend and reassess 10/27.   -She is on every 6 hour Accu-Cheks while on tube feeding with sliding scale insulin  coverage.     History of tobacco use disorder   -Recommend full cessation of tobacco     Hyponatremia  -Initially hyponatremic on admission.  This has resolved now drifting a bit hypernatremic  -is on TF at 55 cc/hr.  She is getting 20 ml flush q 2 hours.  Will adjust free water and  increase flush free water to 30 cc q 2.   Will need to be cautious with fluid adjustment given her severe CM with EF 10%.   - creatinine is trending up 1.6 (1.39).   -Trend sodium     Elevated liver enzymes  -Likely from shock and low cardiac output.  Overall trend improving.  ALT has normalized AST continues to trend down     NICHO on CKD  -Creatinine improving.  Creatinine 1.38 today down from 1.64.  Did get some contrast load initially on admission likely contributed to rise in creatinine on 10/25  -He has component of chronic kidney disease from poorly controlled diabetes  -Avoid nephrotoxic agents     Fever  -Developed fever on 10/20.  This has resolved.  Low-grade temp with 24 hr Tmax 99.9   -She was started on 1023 Zosyn with plans by pulmonary to transition to Augmentin per tube   - 10/24/2024 respiratory culture showed scant amount of haemophilus influenza  -10/23 blood cultures no growth to date .  Noted 10/23 positive procalcitonin and leukocytosis worsening 10/22 which did improve on antibiotic therapy.      Positive UDS  -Positive for benzos and amphetamines on admission  -Continue to monitor for withdrawal  -Currently she is nonparticipatory in her care so access referral likely not helpful at this point     Peripheral vascular disease  -Doppler pulses on the right.  Vascular was consulted early on in admission  -Vascular felt she likely had peripheral vascular disease and recommended continue supportive care but did not feel surgery was needed and planned to monitor closely.  -Feet remain warm, Doppler pulses on right.     Urinary retention  -Man catheter removed 10/25.  Patient is requiring In-N-Out cath for high  residuals 10/26 but was able to void large amount 10/27 in brief.   -Continue to monitor bladder scans, low threshold to replace Man if high residuals become issue and I/O cath needed routinely to address.      Tachycardia  - noted HR up to 120's.  ST.  ? Related to pain.  RN just medicated with Tylenol.  Recommended bladder scan monitoring.  Tachycardia likely multifactorial.  Does appear she is getting somewhat on the dry side given her rise in sodium.  Will add additional free water which may help with her tachycardia.  Will need to be cautious with hydration efforts given her cardiomyopathy with an EF of 14%.  - WBC trend slightly up 12 (11) but improved from 17.9 on 10/23.  Low grade temp 99.9  - Chest x-ray  10/26 bilateral alveolar and interstitial infiltrates seen on prior imaging the 10/25  -She is on low-dose metoprolol and does have some room for up titration given blood pressures are trending in the 130s.  Will defer to cardiology who is following.     DVT prophylaxis  Lovenox 30 mg SC daily     Discharge  She remains full code and full support.  Her prognosis is guarded.  No family currently at bedside  Expected discharge date/ time has not been documented. TBD, Will need SNF.      Discussed with patient and nursing staff.     Palliative care consult today for discussion of goals of care.          Rajat Mathews MD  [facility]  10/29/24  15:03 EDT

## 2024-10-29 NOTE — PLAN OF CARE
Goal Outcome Evaluation:  Plan of Care Reviewed With: patient, child           Outcome Evaluation: Neuro exam remains unchanged, VSS. Palliative plans to meet with spouse tomorrow.

## 2024-10-30 NOTE — SIGNIFICANT NOTE
10/30/24 1610   OTHER   Discipline physical therapy assistant   Rehab Time/Intention   Session Not Performed other (see comments)  (checked on pt this afternoon. Pt briefly opened eyes but quickly closed them. pt not following any simple commands. Will follow up with pt tomorrow.)   Recommendation   PT - Next Appointment 10/31/24

## 2024-10-30 NOTE — NURSING NOTE
Pt's b/p has been soft this shift. She has received 2 of the 3 ordered doses of lasix. I consulted with LUIS CHAMBERLAIN regarding administering or withholding last dose of lasix 60 mg d/t b/p and she advised me to hold for now and just make Dr. Vital aware that pt did not receive last dose.

## 2024-10-30 NOTE — PLAN OF CARE
Pt has not urinated on her own this shift. Straight cathed twice.       Problem: Restraint, Nonviolent  Goal: Absence of Harm or Injury  10/30/2024 0545 by Torri Harmon RN  Outcome: Progressing  10/30/2024 0543 by Torri Harmon RN  Outcome: Progressing  Intervention: Implement Least Restrictive Safety Strategies  Recent Flowsheet Documentation  Taken 10/30/2024 0400 by Torri Harmon RN  Medical Device Protection: tubing secured  Diversional Activities: television  Taken 10/30/2024 0200 by Torri Harmon RN  Medical Device Protection: tubing secured  Diversional Activities: television  Taken 10/30/2024 0000 by Torri Harmon RN  Medical Device Protection: tubing secured  Diversional Activities: television  Taken 10/29/2024 2200 by Torri Harmon RN  Medical Device Protection: tubing secured  Diversional Activities: television  Taken 10/29/2024 2037 by Torri Harmon RN  Medical Device Protection:   IV pole/bag removed from visual field   tubing secured  Diversional Activities: television  Taken 10/29/2024 2000 by Torri Harmon RN  Medical Device Protection: tubing secured  Diversional Activities: television  Intervention: Protect Dignity, Rights and Personal Wellbeing  Recent Flowsheet Documentation  Taken 10/29/2024 2037 by Torri Harmon RN  Trust Relationship/Rapport: care explained  Intervention: Protect Skin and Joint Integrity  Recent Flowsheet Documentation  Taken 10/30/2024 0415 by Torri Harmon RN  Body Position:   weight shifting   tilted   right  Taken 10/30/2024 0211 by Torri Harmon RN  Body Position:   weight shifting   tilted   left  Taken 10/30/2024 0004 by Torri Harmon RN  Body Position:   weight shifting   supine  Taken 10/29/2024 2204 by Torri Harmon RN  Body Position:   weight shifting   tilted   right  Taken 10/29/2024 2037 by Torri Harmon RN  Body Position:   weight  shifting   tilted   left   Goal Outcome Evaluation:

## 2024-10-30 NOTE — PROGRESS NOTES
[facility] Progress Note      Patient Name: Albina Sosa  : 1965  MRN: [id number]  Primary Care Physician:  Germaine James APRN  Date of admission: 10/22/2024    Subjective     Interval History:   Follow-up acute kidney injury and hypernatremia.  Does open her eyes and make eye contact but does not follow any commands.  Does not answer any questions.  Blood pressure in the 90s very early this morning.  Third dose of Lasix held.  Urine output 2.5 L but 3.2 L and given her water and tube feeds.  Review of Systems:   As noted above    Objective     Vitals:   Temp:  [97.3 °F (36.3 °C)-98.4 °F (36.9 °C)] 97.3 °F (36.3 °C)  Heart Rate:  [] 88  Resp:  [16-22] 16  BP: ()/(67-90) 102/79  Flow (L/min) (Oxygen Therapy):  [3] 3    Intake/Output Summary (Last 24 hours) at 10/30/2024 1043  Last data filed at 10/30/2024 0900  Gross per 24 hour   Intake 3761 ml   Output 2525 ml   Net 1236 ml       Physical Exam:    General Appearance: Restless.  Less tachypneic today not in distress.  Does make eye contact but does not follow any commands.  Skin: warm and dry  HEENT: oral mucosa dry.  Nasal oxygen.  Core track in place, nonicteric sclera  Neck: supple, no JVD  Lungs: Coarse upper airway rhonchi.  Heart: RRR, normal S1 and S2  Abdomen: soft, nontender, nondistended. +bowel sounds.  Body wall edema.  : no palpable bladder  Extremities: no edema, cyanosis or clubbing  Neuro: normal speech and mental status     Scheduled Meds:     aspirin, 81 mg, Nasogastric, Daily  atorvastatin, 40 mg, Oral, Nightly  castor oil-balsam peru, 1 Application, Topical, Q12H  chlorhexidine, 15 mL, Mouth/Throat, Q12H  clopidogrel, 75 mg, Nasogastric, Daily  enoxaparin, 30 mg, Subcutaneous, Nightly  famotidine, 20 mg, Nasogastric, Daily  insulin glargine, 30 Units, Subcutaneous, Q12H  insulin regular, 4-24 Units, Subcutaneous, Q4H  metoprolol tartrate, 12.5 mg, Oral, Q12H  venlafaxine, 37.5 mg,  Nasogastric, BID      IV Meds:   milrinone, 0.25 mcg/kg/min, Last Rate: 0.25 mcg/kg/min (10/29/24 1840)        Results Reviewed:   I have personally reviewed the results from the time of this admission to 10/30/2024 10:43 EDT     Results from last 7 days   Lab Units 10/30/24  0526 10/29/24  0328 10/28/24  1445 10/28/24  0402 10/27/24  0742 10/26/24  2015   SODIUM mmol/L 141 145  --  152*   < > 147*   POTASSIUM mmol/L 3.5 4.1  4.1 3.9 3.9   < > 4.0   CHLORIDE mmol/L 98 108*  --  113*   < > 109*   CO2 mmol/L 30.7* 28.6  --  28.1   < > 28.2   BUN mg/dL 46* 43*  --  50*   < > 38*   CREATININE mg/dL 1.26* 1.50*  --  1.36*   < > 1.39*   CALCIUM mg/dL 8.3* 8.4*  --  8.5*   < > 9.2   BILIRUBIN mg/dL 0.5  --   --  0.3  --  0.3   ALK PHOS U/L 185*  --   --  159*  --  159*   ALT (SGPT) U/L 33  --   --  34*  --  38*   AST (SGOT) U/L 41*  --   --  62*  --  51*   GLUCOSE mg/dL 165* 144*  --  131*   < > 184*    < > = values in this interval not displayed.       Estimated Creatinine Clearance: 40.6 mL/min (A) (by C-G formula based on SCr of 1.26 mg/dL (H)).    Results from last 7 days   Lab Units 10/30/24  0526 10/29/24  0328 10/28/24  0402   MAGNESIUM mg/dL 2.6  --   --    PHOSPHORUS mg/dL 3.0 3.0 3.6       Results from last 7 days   Lab Units 10/28/24  0402   URIC ACID mg/dL 7.7*       Results from last 7 days   Lab Units 10/30/24  0526 10/28/24  2353 10/28/24  0402 10/27/24  0527 10/26/24  0307   WBC 10*3/mm3 13.60* 14.60* 14.30* 12.28* 11.13*   HEMOGLOBIN g/dL 12.7 11.9* 11.4* 12.4 12.4   PLATELETS 10*3/mm3 442 465* 395 486* 359               Assessment / Plan     ASSESSMENT:  Acute kidney injury and hypernatremia.  Waste products up some today.  Likely cardiorenal.  Sodium better today with cortrakfree water but still looks and sounds volume overloaded.  Reduce free water today and I have talked with the dietitian about a formula that reduces her volume requirement.  ST elevation MI.  Severe heart failure reduced ejection  fraction with calculated EF 14%.  Underwent heart cath 10/22/2024.  Status post mid marginal branch PCI and stent.  Severe chronic disease through LAD,  circumflex.   Post cardiac cath left MCA CVA.  Interval left cerebellar stroke.  Status post TNK and thrombectomy.  4.  Diabetes mellitus type 2  5.  Sacral pressure injury deep tissue coccyx wound.  PLAN:  Palliative care appropriate.  Plan for discussion this afternoon noted.  2.  Reduce Free water to 50 cc/h  3.  Dietitian to decide on tube feed formula with 2 hugo/cc and lower glucose.  4.  Replace potassium.  Thank you for involving us in the care of Albina Sosa.  Please feel free to call with any questions.    Aline Vital MD  10/30/24  10:43 EDT    [facility]  [phone]    Please note that portions of this note were completed with a voice recognition program.

## 2024-10-30 NOTE — PROGRESS NOTES
[facility] Progress Note       Encounter Date:10/30/24  Patient:Albina Sosa  :1965  MRN:[id number]      Chief Complaint: Follow-up STEMI      Subjective:        Patient nonverbal no new neuro changes overnight.  Was by for tentative plans for in person palliative care discussion however patient's  was unable to arrange transportation this was held via conference with patient's  and daughter with our palliative care team.  They would like to move forward with all possible measures to try and attempt rehab.      Review of Systems:  Review of Systems   Unable to perform ROS: Patient nonverbal       Medications:  Scheduled Meds:  aspirin, 81 mg, Nasogastric, Daily  atorvastatin, 40 mg, Oral, Nightly  castor oil-balsam peru, 1 Application, Topical, Q12H  chlorhexidine, 15 mL, Mouth/Throat, Q12H  clopidogrel, 75 mg, Nasogastric, Daily  enoxaparin, 30 mg, Subcutaneous, Nightly  famotidine, 20 mg, Nasogastric, Daily  insulin glargine, 30 Units, Subcutaneous, Q12H  insulin regular, 4-24 Units, Subcutaneous, Q4H  metoprolol tartrate, 12.5 mg, Oral, Q12H  venlafaxine, 37.5 mg, Nasogastric, BID    Continuous Infusions:  milrinone, 0.25 mcg/kg/min, Last Rate: 0.25 mcg/kg/min (10/29/24 1840)    PRN Meds:    acetaminophen    dextrose    dextrose    glucagon (human recombinant)    hydrALAZINE    HYDROcodone-acetaminophen    ipratropium-albuterol    ipratropium-albuterol    loperamide    nitroglycerin    OLANZapine    ondansetron ODT **OR** ondansetron    [COMPLETED] Insert Peripheral IV **AND** sodium chloride         Objective:       Vitals:    10/30/24 0415 10/30/24 0600 10/30/24 0720 10/30/24 1133   BP: 104/73  102/79 126/89   BP Location: Left arm  Left arm Left arm   Patient Position: Lying  Lying Lying   Pulse: 81  88 89   Resp: 18  16 16   Temp: 98.4 °F (36.9 °C)  97.3 °F (36.3 °C) 98.4 °F (36.9 °C)   TempSrc: Oral  Oral Oral   SpO2: 96%  95% 96%   Weight:  60.2 kg (132 lb 11.5 oz)      Height:               Physical Exam:  Constitutional: Chronically ill-appearing, frail, no acute distress   HENT: Oropharynx clear and membrane moist, NG in place  Eyes: Normal conjunctiva, no sclera icterus.  Neck: Supple, no carotid bruit bilaterally.  Cardiovascular: Regular and Tachycardia rate and rhythm, No Murmur, No bilateral lower extremity edema.  Pulmonary: Normal respiratory effort, coarse lung sounds, no wheezing.  Abdominal: Soft, nontender, no hepatosplenomegaly, liver is non-pulsatile.  Neurological: Still fairly sleepy not following commands well does move L leg  Skin: Warm, dry, no ecchymosis, no rash.  Psych: Unable to assess.           Lab Review:   Results from last 7 days   Lab Units 10/30/24  0526 10/29/24  0328 10/28/24  1445 10/28/24  0402 10/27/24  0742 10/26/24  2015 10/26/24  1025 10/26/24  0307 10/25/24  0404 10/24/24  1502 10/24/24  0333   SODIUM mmol/L 141 145  --  152* 150* 147*  --  148* 144  --  136   POTASSIUM mmol/L 3.5 4.1  4.1 3.9 3.9 4.0 4.0 3.7 3.3* 4.0   < > 3.6   CHLORIDE mmol/L 98 108*  --  113* 110* 109*  --  110* 111*  --  100   CO2 mmol/L 30.7* 28.6  --  28.1 29.0 28.2  --  26.3 23.7  --  24.8   BUN mg/dL 46* 43*  --  50* 53* 38*  --  34* 34*  --  30*   CREATININE mg/dL 1.26* 1.50*  --  1.36* 1.60* 1.39*  --  1.38* 1.64*  --  1.63*   GLUCOSE mg/dL 165* 144*  --  131* 197* 184*  --  168* 166*  --  178*   CALCIUM mg/dL 8.3* 8.4*  --  8.5* 9.0 9.2  --  8.8 8.4*  --  9.4   AST (SGOT) U/L 41*  --   --  62*  --  51*  --   --   --   --  58*   ALT (SGPT) U/L 33  --   --  34*  --  38*  --   --   --   --  24    < > = values in this interval not displayed.           Results from last 7 days   Lab Units 10/30/24  0526 10/28/24  2353 10/28/24  0402 10/27/24  0527 10/26/24  0307 10/25/24  0404 10/24/24  0333   WBC 10*3/mm3 13.60* 14.60* 14.30* 12.28* 11.13* 10.98* 15.34*   HEMOGLOBIN g/dL 12.7 11.9* 11.4* 12.4 12.4 11.1* 12.2   HEMATOCRIT % 37.7 36.9 34.3 38.9 36.6 34.0 36.5  "  PLATELETS 10*3/mm3 442 465* 395 486* 359 316 304           Results from last 7 days   Lab Units 10/30/24  0526   MAGNESIUM mg/dL 2.6           Invalid input(s): \"LDLCALC\"          Results from last 7 days   Lab Units 10/26/24  1025   TSH uIU/mL 0.349       Echocardiogram 10/23/2024:  Left ventricular systolic function is severely decreased. Calculated left ventricular EF = 14.2%, visually estimated LVEF 20-25%.  There is global hypokinesis.  Additionally, the following left ventricular wall segments are akinetic: basal anterolateral, mid anterolateral, apical lateral, basal inferolateral, mid inferolateral and apex.  Left ventricular wall thickness is consistent with moderate concentric hypertrophy.  Left ventricular mass index is increased.  May consider infiltrative cardiomyopathy in the appropriate clinical setting.  No obvious LV thrombus noted including on echo contrast images.  Mild-moderate mitral regurgitation.  Borderline left atrial enlargement, normal RA and IVC size.  There is a small (<1cm) pericardial effusion. There is no evidence of cardiac tamponade.    Cardiac Catheterization 10/22/2024:  Severe multivessel coronary artery disease with 100% ostial LAD, circumflex contains 100% stenosis of the mid marginal branch at the distal stent edge likely culprit for STEMI presentation, left-sided PDA from the circumflex has 100% proximal segment stenoses with left to left collaterals from a LIMA to LAD supplying the distal PDA, patent LIMA to LAD LAD is diffusely diseased small in caliber size with sequential 80% stenoses.  LVEDP of approximately 30 mmHg  Ejection fraction 10%  Successful PCI to mid marginal with placement of a 2.25 x 12 mm Xience drug-eluting stent deployed initially at 8 keon with her stent balloon back slightly postdilated to 16 at  [place] to right femoral artery  Patient developed acute neurologic changes at the end of the case when we went to load her with aspirin and Plavix.  She was " noted to be fairly unresponsive even after reversal with Narcan and flumazenil code stroke was called she was assessed emergently and taken off her head CT       Assessment:          Diagnosis Plan   1. Acute ST elevation myocardial infarction (STEMI), unspecified artery  Ambulatory Referral to Cardiac Rehab      2. Smoker        3. H/O medication noncompliance        4. Acute pulmonary edema               Plan:       Ms. Sosa is a 59 y.o. woman with past medical history notable for coronary disease status post LIMA to LAD percutaneous interventions to her circumflex and left-sided PDA, hypertension, mixed hyperlipidemia, diabetes type 2 on insulin therapy, history of stroke, history of methamphetamine use, and ongoing tobacco abuse with underlying lung disease who presented to the emergency room with a day or 2 of acute onset chest discomfort on 10/22 was noted to have ST changes concerning for STEMI.  She was taken emergently to the Cath Lab where she was found to have multivessel disease some chronic and on her mid marginal branch which fit with her EKG changes was felt to be the acute lesion and behaved so.  This was revascularized and stent placed.  She was also noted to have occlusion of her left dominant circumflex however this behave more chronic given that there was already collateralization and wire would not pass.  She was also found to have severe cardiomyopathy which is new compared to at least stress findings from 2 years ago.  Unfortunately patient had a stroke symptoms at the end of the case she was taken emergently had TNK and subsequently taken for thrombectomy despite that still had a fairly sizable stroke on the left side.  Patient was intubated for approximately 5 days after her infarct and stroke.  Unfortunately neurostatus has not improved and has remained severely debilitated.  Palliative care discussions were held 10/30 family is wanting to do anything possible to allow her to recover.   They would be okay with PEG tube.  From a cardiac standpoint doing what we can with limited options given the severe damage that patient had with her heart and underlying dysfunction.  She was placed on milrinone infusion over the last 72 hours think that it is can be helpful to help her diurese some like to try and wean this over the next 24 to 48 hours and monitor how she does clinically.  She has responded well with good urine output with this along with diuresis.  Appreciate nephrology's assistance.        STEMI:  Mid marginal branch felt to be culprit however patient with severe chronic disease throughout the LAD and circumflex and left-sided PDA  Status post PCI 10/22  Was loaded with aspirin and clopidogrel in the Cath Lab 10/2022  Continue aspirin and clopidogrel  Continue low-dose metoprolol  Follow-up echocardiogram 10/28 does not show clear evidence of left ventricular.  Would not be a great candidate for anticoagulation at this juncture specially in light of high risk for hemorrhagic conversion of her stroke    Stroke:  Likely cardioembolic unclear if from left ventricular thrombus or clot on the catheter  Status post TNK and thrombectomy  Neurology following  Now on aspirin and clopidogrel  Limited to no improvement neurologically  Would likely need a PEG tube given family's decision and goals of care    Acute systolic congestive heart failure:  Severely elevated LVEDP of 40 mmHg in the Cath Lab  Continue diuresis  Milrinone 0.25 mcg started 10/27 we will continue for another day and likely start to wean this tomorrow to monitor how she does on a lower dose.    Diabetes type 2:   Elevated blood sugars noted but no evidence of acidosis think it is more of a hyperglycemia  Appreciate critical care assistance with managing her severe hyperglycemia     Acute hyponatremia:  Has improved and now hypernatremic treating with free water flushes appreciate renal insight             Bret Lawrence,  MD [facility]  10/30/24  14:26 EDT

## 2024-10-30 NOTE — PROGRESS NOTES
Nutrition Services    Patient Name:  Albina Sosa  YOB: 1965  MRN: [id number]  Admit Date:  10/22/2024  Assessment Date:  10/30/24    CLINICAL NUTRITION    Comments: Follow up for tube feeding  Pt laying in bed sleeping when I visited. RN reported pt tolerating TF well at goal rate. Nephrology contacted RD requesting pt be switched to a 2 kcal/ml formula. RD to switch pt to Novasource Renal. Nephrology managing free water flushes, lowered to 50 ml q 1 hr.     Plan for palliative meeting at 1300 per palliative note.     RECS:  Please discontinue Diabetisource AC and initiate Novasource Renal at 35 ml/hr. Goal rate of 35 ml/hr will provide 1540 kcals, 70 g/protein, and 554 ml free water. Water flushes per nephrology recs.     The above end goal rate is for 22 hrs/day to assume interruptions for ADLs. Water flushes adjusted based on clinical picture + Rx flushes/IV fluids     RD to continue to monitor per protocol.     Encounter Information         Reason For Encounter Follow up for TF    Current Issues STEMI involving left circumflex coronary artery, acute ischemic left middle cerebral artery stroke. Non verbal, not following commands.      Estimated Requirements         Calories  2020-8847 (25 kcal/kg, 30 kcal/kg)    Protein (gm)  67-84 (1.2 - 1.5 gm/kg)    Fluid (mL)   (1 mL/kcal)     Current Nutrition Orders & Evaluation of Intake       Oral Nutrition     Current PO Diet NPO Diet NPO Type: Tube Feeding   Supplement n/a   PO Evaluation     Trending % PO Intake     Factors Affecting Intake  altered respiratory status      Enteral Nutrition     Enteral Route NG    TF Delivery Method Continuous    Propofol Rate/Kcal     Current TF/Rate (mL) Diabetisource AC @ 60 mL/hr    TF Goal Rate (mL) 60    Current Water Flush (mL) 50ml/hr    Modular None    TF Residual  no or minimal residual    TF Tolerance tolerating    TF Observation Verified correct TF and water flush infusing per orders     Anthropometrics    "       Height    Weight Height: 154.9 cm (61\")  Weight: 60.2 kg (132 lb 11.5 oz) (10/30/24 0600)    BMI kg/m2 Body mass index is 25.08 kg/m².  Normal/Healthy (18.4 - 24.9)    Weight trend Unknown     Labs        Pertinent Labs Reviewed, listed below     Results from last 7 days   Lab Units 10/30/24  0526 10/29/24  0328 10/28/24  1445 10/28/24  0402 10/27/24  0742 10/26/24  2015   SODIUM mmol/L 141 145  --  152*   < > 147*   POTASSIUM mmol/L 3.5 4.1  4.1 3.9 3.9   < > 4.0   CHLORIDE mmol/L 98 108*  --  113*   < > 109*   CO2 mmol/L 30.7* 28.6  --  28.1   < > 28.2   BUN mg/dL 46* 43*  --  50*   < > 38*   CREATININE mg/dL 1.26* 1.50*  --  1.36*   < > 1.39*   CALCIUM mg/dL 8.3* 8.4*  --  8.5*   < > 9.2   BILIRUBIN mg/dL 0.5  --   --  0.3  --  0.3   ALK PHOS U/L 185*  --   --  159*  --  159*   ALT (SGPT) U/L 33  --   --  34*  --  38*   AST (SGOT) U/L 41*  --   --  62*  --  51*   GLUCOSE mg/dL 165* 144*  --  131*   < > 184*    < > = values in this interval not displayed.     Results from last 7 days   Lab Units 10/30/24  0526   MAGNESIUM mg/dL 2.6   PHOSPHORUS mg/dL 3.0   HEMOGLOBIN g/dL 12.7   HEMATOCRIT % 37.7   WBC 10*3/mm3 13.60*   ALBUMIN g/dL 2.5*     Results from last 7 days   Lab Units 10/30/24  0526 10/28/24  2353 10/28/24  0402 10/27/24  0527 10/26/24  0307   PLATELETS 10*3/mm3 442 465* 395 486* 359     SARS-CoV-2, SALVADOR   Date Value Ref Range Status   04/29/2022 NEGATIVE Negative Final     Comment:     The 2019-CoV rRT-PCR Assay is only for use under a Food and Drug Administration Emergency Use Authorization. The performance characteristics of the assay were verified by the Clinical Laboratory at [facility]. Results should be used in   conjunction with the patient's clinical symptoms, medical history, and other clinical/laboratory findings to determine an overall clinical diagnosis. Negative results do not preclude infection with SARS-CoV-2 (COVID-19).    Test parameters have not been validated " for screening asymptomatic patients.     Lab Results   Component Value Date    HGBA1C 14.70 (H) 10/23/2024          Medications            Scheduled Medications aspirin, 81 mg, Nasogastric, Daily  atorvastatin, 40 mg, Oral, Nightly  castor oil-balsam peru, 1 Application, Topical, Q12H  chlorhexidine, 15 mL, Mouth/Throat, Q12H  clopidogrel, 75 mg, Nasogastric, Daily  enoxaparin, 30 mg, Subcutaneous, Nightly  famotidine, 20 mg, Nasogastric, Daily  insulin glargine, 30 Units, Subcutaneous, Q12H  insulin regular, 4-24 Units, Subcutaneous, Q4H  metoprolol tartrate, 12.5 mg, Oral, Q12H  potassium chloride, 40 mEq, Nasogastric, Once  venlafaxine, 37.5 mg, Nasogastric, BID        Infusions milrinone, 0.25 mcg/kg/min, Last Rate: 0.25 mcg/kg/min (10/29/24 1840)        PRN Medications   acetaminophen    dextrose    dextrose    glucagon (human recombinant)    hydrALAZINE    HYDROcodone-acetaminophen    ipratropium-albuterol    ipratropium-albuterol    loperamide    nitroglycerin    OLANZapine    ondansetron ODT **OR** ondansetron    [COMPLETED] Insert Peripheral IV **AND** sodium chloride     Physical Findings          Physical Appearance appeared asleep   Oral/Mouth Cavity tooth or teeth missing   Edema  not assessed   Gastrointestinal hypoactive bowel sounds, last bowel movement: 10/27   Skin  bruising   Tubes/Drains Cortrak, NG tube, bridle in place   NFPE Not indicated at this time     NUTRITION INTERVENTION / PLAN OF CARE  Intervention Goal         Intervention Goal(s) Maintain nutrition status, Reduce/improve symptoms, Meet estimated needs, Disease management/therapy, Tolerate TF/PN at goal, Transition TF to PO, and Maintain weight     Nutrition Intervention         RD Action Continue to monitor and Care plan reviewed     Prescription         Diet Prescription     Supplement Prescription    EN/PN Prescription    New Prescription Ordered? Continue same per protocol   --  Enteral Prescription:      Enteral Route NG    TF  Delivery Method Continuous    Enteral Product Novasource Renal     Modular None    Propofol Rate/Kcal     TF Start Rate 35    TF Goal Rate 35    Free Water Flush 50 ml/hr    TF Provision at Goal: 1540 kcals, 70 g/protein, and 554 ml free water.          Calories 100 % needs met         Protein  100 % needs met         Fluid (mL)      Prescription Ordered Yes      Monitor/Evaluation        Monitor Per protocol, I&O, Pertinent labs, EN delivery/tolerance, Weight, Skin status, GI status, Symptoms   Discharge Needs Pending clinical course       Electronically signed by:  Ernie Engle RDN, TADEO  10/30/24 11:26 EDT

## 2024-10-30 NOTE — PLAN OF CARE
VSS this shift. No nausea or pain noted. Goals of Care/palliative meeting occurred this shift. Pt tolerating new tube feed formula and flushes. No neuro changes noted. Man placed this shift for urinary retention.

## 2024-10-30 NOTE — PROGRESS NOTES
"Enter Query Response Below      Query Response:     Cardiogenic shock     Electronically signed by Rajat Mathews MD, 10/30/24, 6:22 PM EDT.               If applicable, please update the problem list.   Patient: Albina Darden        : 1965  Account: [id number]           Admit Date:         How to Respond to this query:       a. Click New Note     b. Answer query within the yellow box.                c. Update the Problem List, if applicable.      If you have any questions about this query contact me at: [email or web address]     :     Patient admitted on 10/22 with a STEMI. S/p stent to circumflex. Procedure complicated by stroke for which TNK and thrombectomy performed.  10/22 BPs documented of 118/80, 69/52, 86/66, 88/63.  Patient on Levophed drip (10/22).  10/23 Echo \"Left ventricular systolic function is severely decreased. Calculated left ventricular EF = 14.2%, visually estimated LVEF 20-25%.\"  10/26 ALT 38, AST 51, Alkaline Phosphatase 159, Total Bilirubin 0.3.  \"Elevated liver enzymes-Likely from shock and low cardiac output.\" documented in the Hospitalist progress notes on 10/26, 10/27 and 10/28.    After study, can the patient's shock be further specified as;    Cardiogenic shock  Shock of unknown type  Other- specify______    By submitting this query, we are merely seeking further clarification of documentation to accurately reflect all conditions that you are monitoring, evaluating, treating or that extend the hospitalization or utilize additional resources of care. Please utilize your independent clinical judgment when addressing the question(s) above.     This query and your response, once completed, will be entered into the legal medical record.    Sincerely,  Eve Andujar RN  Clinical Documentation Integrity Program     "

## 2024-10-30 NOTE — SIGNIFICANT NOTE
10/30/24 1301   OTHER   Discipline occupational therapist   Rehab Time/Intention   Session Not Performed other (see comments)  (Palliative meeting today at 1300 for [facility].  Will follow-up next date.)   Therapy Assessment/Plan (PT)   Criteria for Skilled Interventions Met (PT) yes   Recommendation   OT - Next Appointment 10/31/24

## 2024-10-30 NOTE — PROGRESS NOTES
Name: Albina Sosa ADMIT: 10/22/2024   : 1965  PCP: Germaine James APRN    MRN: [id number] LOS: 8 days   AGE/SEX: 59 y.o. female  ROOM: [facility]     Subjective   Subjective   Patient seen at bedside.        Objective   Objective   Vital Signs  Temp:  [97.3 °F (36.3 °C)-99 °F (37.2 °C)] 99 °F (37.2 °C)  Heart Rate:  [] 92  Resp:  [16-20] 20  BP: ()/(69-89) 119/70  SpO2:  [94 %-98 %] 97 %  on  Flow (L/min) (Oxygen Therapy):  [3] 3;   Device (Oxygen Therapy): nasal cannula  Body mass index is 25.08 kg/m².  Physical Exam  Expand All Collapse All         Name: Albina Sosa ADMIT: 10/22/2024   : 1965  PCP: Germaine James APRN    MRN: [id number] LOS: 7 days   AGE/SEX: 59 y.o. female  ROOM: [facility]      Subjective      Subjective  Patient is seen at bedside, no new complaints.           Objective      Objective  Vital Signs  Temp:  [97.5 °F (36.4 °C)-98.9 °F (37.2 °C)] 97.9 °F (36.6 °C)  Heart Rate:  [] 88  Resp:  [16-24] 22  BP: ()/(67-89) 96/67  SpO2:  [92 %-94 %] 92 %  on  Flow (L/min) (Oxygen Therapy):  [3] 3;   Device (Oxygen Therapy): nasal cannula  Body mass index is 25.08 kg/m².  Physical Exam  Vitals and nursing note reviewed.   Constitutional:       Appearance: She is ill-appearing (Chronically ill-appearing).   Eyes:      General: No scleral icterus.     Conjunctiva/sclera: Conjunctivae normal.      Pupils: Pupils are equal, round, and reactive to light.   Cardiovascular:      Rate and Rhythm: Regular rhythm. Tachycardia present.      Pulses: Normal pulses.      Heart sounds: Normal heart sounds.   Pulmonary:      Effort: Pulmonary effort is normal. No respiratory distress.      Breath sounds: Normal breath sounds.   Abdominal:      General: Bowel sounds are normal. There is no distension.      Palpations: Abdomen is soft.      Tenderness: There is no abdominal tenderness.   Musculoskeletal:      Right lower leg: No edema.      Left lower leg: No edema.    Skin:     General: Skin is warm and dry.   Neurological:      Mental Status: She is alert.      Comments: Right hemiparesis, spontaneously moves left upper and lower extremity. Nonverbal. Right facial droop noted.                 Results Review     I reviewed the patient's new clinical results.  Results from last 7 days   Lab Units 10/30/24  0526 10/28/24  2353 10/28/24  0402 10/27/24  0527   WBC 10*3/mm3 13.60* 14.60* 14.30* 12.28*   HEMOGLOBIN g/dL 12.7 11.9* 11.4* 12.4   PLATELETS 10*3/mm3 442 465* 395 486*     Results from last 7 days   Lab Units 10/30/24  0526 10/29/24  0328 10/28/24  1445 10/28/24  0402 10/27/24  0742   SODIUM mmol/L 141 145  --  152* 150*   POTASSIUM mmol/L 3.5 4.1  4.1 3.9 3.9 4.0   CHLORIDE mmol/L 98 108*  --  113* 110*   CO2 mmol/L 30.7* 28.6  --  28.1 29.0   BUN mg/dL 46* 43*  --  50* 53*   CREATININE mg/dL 1.26* 1.50*  --  1.36* 1.60*   GLUCOSE mg/dL 165* 144*  --  131* 197*   EGFR mL/min/1.73 49.6* 40.2*  --  45.2* 37.2*     Results from last 7 days   Lab Units 10/30/24  0526 10/29/24  0328 10/28/24  0402 10/27/24  0742 10/26/24  2015 10/25/24  0404 10/24/24  0333   ALBUMIN g/dL 2.5* 2.6* 2.8* 3.0* 3.0*   < > 2.8*   BILIRUBIN mg/dL 0.5  --  0.3  --  0.3  --  0.4   ALK PHOS U/L 185*  --  159*  --  159*  --  115   AST (SGOT) U/L 41*  --  62*  --  51*  --  58*   ALT (SGPT) U/L 33  --  34*  --  38*  --  24    < > = values in this interval not displayed.     Results from last 7 days   Lab Units 10/30/24  0526 10/29/24  0328 10/28/24  0402 10/27/24  1847 10/27/24  0742   CALCIUM mg/dL 8.3* 8.4* 8.5*  --  9.0   ALBUMIN g/dL 2.5* 2.6* 2.8*  --  3.0*   MAGNESIUM mg/dL 2.6  --   --   --   --    PHOSPHORUS mg/dL 3.0 3.0 3.6 3.6 1.9*       Glucose   Date/Time Value Ref Range Status   10/30/2024 1610 185 (H) 70 - 130 mg/dL Final   10/30/2024 1212 151 (H) 70 - 130 mg/dL Final   10/30/2024 0810 126 70 - 130 mg/dL Final   10/30/2024 0422 182 (H) 70 - 130 mg/dL Final   10/30/2024 0030 197 (H) 70 -  130 mg/dL Final   10/29/2024 1942 148 (H) 70 - 130 mg/dL Final   10/29/2024 1624 135 (H) 70 - 130 mg/dL Final       CT Head Without Contrast    Result Date: 10/29/2024  Large area of evolving infarction within the left MCA territory is again noted. There is an additional area of evolving infarction within the left cerebellar hemisphere. Both of these areas appear stable in size. There is some mild gyriform increased attenuation which is noted within the left MCA territory infarct, favored to represent some mild petechial hemorrhagic transformation. No large intraparenchymal hematoma is seen.  Radiation dose reduction techniques were utilized, including automated exposure control and exposure modulation based on body size.   This report was finalized on 10/29/2024 5:30 AM by Dr. Angélica Gallo M.D on Workstation: [id number]      XR Chest 1 View    Result Date: 10/29/2024  Significant interval worsening in bilateral alveolar and interstitial infiltrates, suggesting increasing edema.  This report was finalized on 10/29/2024 12:54 AM by Dr. Angélica Gallo M.D on Workstation: [id number]       I have personally reviewed all medications:  Scheduled Medications  aspirin, 81 mg, Nasogastric, Daily  atorvastatin, 40 mg, Oral, Nightly  castor oil-balsam peru, 1 Application, Topical, Q12H  chlorhexidine, 15 mL, Mouth/Throat, Q12H  clopidogrel, 75 mg, Nasogastric, Daily  enoxaparin, 30 mg, Subcutaneous, Nightly  famotidine, 20 mg, Nasogastric, Daily  insulin glargine, 30 Units, Subcutaneous, Q12H  insulin regular, 4-24 Units, Subcutaneous, Q4H  metoprolol tartrate, 12.5 mg, Oral, Q12H  venlafaxine, 37.5 mg, Nasogastric, BID    Infusions  milrinone, 0.25 mcg/kg/min, Last Rate: 0.25 mcg/kg/min (10/29/24 1840)    Diet  NPO Diet NPO Type: Tube Feeding    I have personally reviewed:  [x]  Laboratory   [x]  Microbiology   [x]  Radiology   [x]  EKG/Telemetry  [x]  Cardiology/Vascular   []  Pathology    []  Records        Assessment/Plan     Active Hospital Problems    Diagnosis  POA    **STEMI involving left circumflex coronary artery [I21.21]  Yes    Hyperlipemia [E78.5]  Unknown    Acute hypoxic respiratory failure [J96.01]  Unknown    COPD (chronic obstructive pulmonary disease) [J44.9]  Unknown    Type 2 diabetes mellitus with hyperglycemia [E11.65]  Unknown    Ischemic cardiomyopathy [I25.5]  Unknown    Elevated liver enzymes [R74.8]  Unknown    Polysubstance abuse [F19.10]  Unknown    Peripheral vascular disease [I73.9]  Unknown    Acute ischemic left middle cerebral artery (MCA) stroke [I63.512]  No      Resolved Hospital Problems   No resolved problems to display.       59 y.o. female admitted with STEMI involving left circumflex coronary artery.    59 y.o. female  admitted to [facility] via EMS 10/22/24 for STEMI. She has history of coronary artery disease with CAB with LIMA to the LAD, with hx of PCI to circumflex and left PDA, hypertension, mixed hyperlipidemia, type 2 diabetes on insulin therapy, prior CVA, history of methamphetamine use, and ongoing tobacco use with underlying lung disease.  She underwent emergent coronary catheterization and was found to have multivessel disease with severe cardiomyopathy and had a PCI with stent to culprit distal marginal branch with residual diffuse LAD and PDA disease with collaterals.  EF 14% by echo without obvious thrombus.  Post PCI she was noted to have increased agitation and right sided weakness with aphasia and stat neurology consult was placed and team D was activated.  CT of the head showed no acute hemorrhage or hypodensity, CTA of the head and neck was not performed at that time due to agitation.  Patient was given TNK and sent for angiogram out of concern for left MCA disease and had mechanical cranial artery embolectomy.          Acute left MCA ischemic stroke  - status post TNK and embolectomy  - Neurology following.  Repeat head imaging negative for hemorrhagic conversion  per nephrology  -Remains on dual antiplatelet therapy and statin therapy  -Neurology is recommending BP systolic 140 or less  with strict blood pressure control to prevent hemorrhagic transformation  -Neurology has okayed use of Lovenox for DVT prophylaxis  -Continue therapies PT and OT  -LDL goal less than 70     Acute STEMI status post primary PCI/stent / ischemic cardiomyopathy  -Cardiac cath with multivessel disease with PCI/stent to mid marginal branch  -EF 14% by echo without clear evidence of left ventricular thrombus  -Cardiology following  -Currently on aspirin and Plavix  -History of prior CAB.   - cath with residual with diffuse LAD and PDA disease with collaterals.   -Was noted to have severely elevated left ventricular end-diastolic pressures in the Cath Lab and she was given IV diuretics with improvement in her chest x-ray imaging.  -Will need to monitor volume status closely.     Hyperlipidemia poorly controlled / hypertriglyceridemia  -Currently on atorvastatin  -Noted total cholesterol back in 2023 was 403 with an LDL incalculable with triglycerides over a thousand.  -Admission lipid panel does show total cholesterol of 245, HDL 37 , triglycerides 311.  LDL HDL ratio is 3.94  - goal LDL less than 70  -Certainly her poorly controlled diabetes is contributing to her elevated triglycerides and subsequent elevated LDL.  Glycemic control is needed.  Unsure regarding adherence to medical therapies as outpt.    - 10/26 TSH check normal.      Acute hypoxic respiratory failure/COPD  -Initially requiring ventilator support . Liberated from ventilator 10/25 now on 3 L nasal cannula  -Pulmonary is following  -Continue nebulizer with good pulmonary hygiene.      Type 2 diabetes, uncontrolled hyperglycemia   -A1c on admit 14.7  -Sugars over 600 on admission  -unclear on her adherence and DM regimen as an outpatient  -Currently on 30 units Lantus every 12.  Last dose adjustment 10/26 PM dosing and has only  had 2 doses thus far of 30 units.    - FSBS trends improved  with mostly under 200.  Will give full 24 hours at current dosing before further adjustment.  Trend and reassess 10/27.   -She is on every 6 hour Accu-Cheks while on tube feeding with sliding scale insulin coverage.     History of tobacco use disorder   -Recommend full cessation of tobacco     Hyponatremia  -Initially hyponatremic on admission.  This has resolved now drifting a bit hypernatremic  -is on TF at 55 cc/hr.  She is getting 20 ml flush q 2 hours.  Will adjust free water and  increase flush free water to 30 cc q 2.   Will need to be cautious with fluid adjustment given her severe CM with EF 10%.   - creatinine is trending up 1.6 (1.39).   -Trend sodium     Elevated liver enzymes  -Likely from shock and low cardiac output.  Overall trend improving.  ALT has normalized AST continues to trend down     NICHO on CKD  -Creatinine improving.  Creatinine 1.38 today down from 1.64.  Did get some contrast load initially on admission likely contributed to rise in creatinine on 10/25  -He has component of chronic kidney disease from poorly controlled diabetes  -Avoid nephrotoxic agents     Fever  -Developed fever on 10/20.  This has resolved.  Low-grade temp with 24 hr Tmax 99.9   -She was started on 1023 Zosyn with plans by pulmonary to transition to Augmentin per tube   - 10/24/2024 respiratory culture showed scant amount of haemophilus influenza  -10/23 blood cultures no growth to date .  Noted 10/23 positive procalcitonin and leukocytosis worsening 10/22 which did improve on antibiotic therapy.      Positive UDS  -Positive for benzos and amphetamines on admission  -Continue to monitor for withdrawal  -Currently she is nonparticipatory in her care so access referral likely not helpful at this point     Peripheral vascular disease  -Doppler pulses on the right.  Vascular was consulted early on in admission  -Vascular felt she likely had peripheral vascular  disease and recommended continue supportive care but did not feel surgery was needed and planned to monitor closely.  -Feet remain warm, Doppler pulses on right.     Urinary retention  -Man catheter removed 10/25.  Patient is requiring In-N-Out cath for high residuals 10/26 but was able to void large amount 10/27 in brief.   -Continue to monitor bladder scans, low threshold to replace Man if high residuals become issue and I/O cath needed routinely to address.      Tachycardia  - noted HR up to 120's.  ST.  ? Related to pain.  RN just medicated with Tylenol.  Recommended bladder scan monitoring.  Tachycardia likely multifactorial.  Does appear she is getting somewhat on the dry side given her rise in sodium.  Will add additional free water which may help with her tachycardia.  Will need to be cautious with hydration efforts given her cardiomyopathy with an EF of 14%.  - WBC trend slightly up 12 (11) but improved from 17.9 on 10/23.  Low grade temp 99.9  - Chest x-ray  10/26 bilateral alveolar and interstitial infiltrates seen on prior imaging the 10/25  -She is on low-dose metoprolol and does have some room for up titration given blood pressures are trending in the 130s.  Will defer to cardiology who is following.     DVT prophylaxis  Lovenox 30 mg SC daily     Discharge  She remains full code and full support.  Her prognosis is guarded.  No family currently at bedside  Expected discharge date/ time has not been documented. TBD, Will need SNF.      Discussed with patient and nursing staff.     Palliative care consult for discussion of goals of care.            Rajat Mathews MD  [facility]  10/30/24  16:43 EDT

## 2024-10-31 NOTE — PLAN OF CARE
Problem: Adult Inpatient Plan of Care  Goal: Plan of Care Review  Outcome: Progressing  Goal: Patient-Specific Goal (Individualized)  Outcome: Progressing  Goal: Absence of Hospital-Acquired Illness or Injury  Outcome: Progressing  Intervention: Identify and Manage Fall Risk  Recent Flowsheet Documentation  Taken 10/31/2024 0600 by Noe Juárez RN  Safety Promotion/Fall Prevention:   safety round/check completed   room organization consistent   nonskid shoes/slippers when out of bed   lighting adjusted   fall prevention program maintained   clutter free environment maintained  Taken 10/31/2024 0416 by Noe Juárez RN  Safety Promotion/Fall Prevention:   safety round/check completed   room organization consistent   nonskid shoes/slippers when out of bed   lighting adjusted   fall prevention program maintained   clutter free environment maintained  Taken 10/31/2024 0217 by Noe Juárez RN  Safety Promotion/Fall Prevention:   safety round/check completed   room organization consistent   nonskid shoes/slippers when out of bed   lighting adjusted   fall prevention program maintained   clutter free environment maintained  Taken 10/31/2024 0024 by Noe Juárez RN  Safety Promotion/Fall Prevention:   safety round/check completed   room organization consistent   nonskid shoes/slippers when out of bed   lighting adjusted   fall prevention program maintained   clutter free environment maintained  Taken 10/30/2024 2207 by Noe Juárez RN  Safety Promotion/Fall Prevention:   safety round/check completed   room organization consistent   nonskid shoes/slippers when out of bed   lighting adjusted   fall prevention program maintained   clutter free environment maintained  Taken 10/30/2024 2027 by Noe Juárez RN  Safety Promotion/Fall Prevention:   safety round/check completed   room organization consistent   nonskid shoes/slippers when out of bed   lighting adjusted   fall prevention program  maintained   clutter free environment maintained  Intervention: Prevent Skin Injury  Recent Flowsheet Documentation  Taken 10/31/2024 0600 by Noe Juárez RN  Body Position: supine  Taken 10/31/2024 0416 by Noe Juárez RN  Body Position:   turned   left  Taken 10/31/2024 0217 by Noe Juárez RN  Body Position: supine  Taken 10/31/2024 0024 by Noe Juárez RN  Body Position: supine  Taken 10/30/2024 2207 by Noe Juárez RN  Body Position:   turned   left  Taken 10/30/2024 2027 by Noe Juárez RN  Body Position: supine  Skin Protection: incontinence pads utilized  Intervention: Prevent and Manage VTE (Venous Thromboembolism) Risk  Recent Flowsheet Documentation  Taken 10/30/2024 2027 by Noe Juárez RN  VTE Prevention/Management:   bilateral   SCDs (sequential compression devices) on  Intervention: Prevent Infection  Recent Flowsheet Documentation  Taken 10/31/2024 0600 by Noe Juárez RN  Infection Prevention:   rest/sleep promoted   single patient room provided   environmental surveillance performed  Taken 10/31/2024 0416 by Noe Juárez RN  Infection Prevention:   rest/sleep promoted   single patient room provided   environmental surveillance performed  Taken 10/31/2024 0217 by Noe Juárez RN  Infection Prevention:   single patient room provided   rest/sleep promoted   environmental surveillance performed  Taken 10/31/2024 0024 by Noe Juárez RN  Infection Prevention:   single patient room provided   rest/sleep promoted   environmental surveillance performed  Taken 10/30/2024 2207 by Noe Juárez RN  Infection Prevention:   single patient room provided   rest/sleep promoted   environmental surveillance performed  Taken 10/30/2024 2027 by Noe Juárez RN  Infection Prevention:   single patient room provided   rest/sleep promoted   environmental surveillance performed  Goal: Optimal Comfort and Wellbeing  Outcome:  Progressing  Intervention: Provide Person-Centered Care  Recent Flowsheet Documentation  Taken 10/30/2024 2027 by Noe Juárez RN  Trust Relationship/Rapport: care explained  Goal: Readiness for Transition of Care  Outcome: Progressing     Problem: Restraint, Nonviolent  Goal: Absence of Harm or Injury  Outcome: Progressing  Intervention: Implement Least Restrictive Safety Strategies  Recent Flowsheet Documentation  Taken 10/31/2024 0600 by Noe Juárez RN  Medical Device Protection:   torso covered   tubing secured  Diversional Activities: television  Taken 10/31/2024 0416 by Noe Juárez RN  Medical Device Protection:   torso covered   tubing secured  Taken 10/31/2024 0400 by Noe Juárez RN  Medical Device Protection:   torso covered   tubing secured  Diversional Activities: television  Taken 10/31/2024 0217 by Noe Juárez RN  Medical Device Protection:   torso covered   tubing secured  Taken 10/31/2024 0200 by Noe Juárez RN  Medical Device Protection:   tubing secured   torso covered  Diversional Activities: television  Taken 10/31/2024 0024 by Noe Juárez RN  Medical Device Protection:   torso covered   tubing secured   IV pole/bag removed from visual field  Taken 10/31/2024 0000 by Noe Juárez RN  Medical Device Protection:   tubing secured   torso covered  Diversional Activities: television  Taken 10/30/2024 2207 by Noe Juárez RN  Medical Device Protection:   tubing secured   torso covered  Taken 10/30/2024 2200 by Noe Juárez RN  Medical Device Protection:   tubing secured   torso covered   IV pole/bag removed from visual field  Diversional Activities: television  Taken 10/30/2024 2027 by Noe Juárez RN  Medical Device Protection:   tubing secured   torso covered  Diversional Activities: television  Taken 10/30/2024 2000 by Noe Juárez RN  Medical Device Protection:   tubing secured   IV pole/bag removed from visual field    torso covered  Diversional Activities: television  Intervention: Protect Dignity, Rights and Personal Wellbeing  Recent Flowsheet Documentation  Taken 10/30/2024 2027 by Noe Juárez RN  Trust Relationship/Rapport: care explained  Intervention: Protect Skin and Joint Integrity  Recent Flowsheet Documentation  Taken 10/31/2024 0600 by Noe Juárez RN  Body Position: supine  Taken 10/31/2024 0416 by Noe Juárez RN  Body Position:   turned   left  Taken 10/31/2024 0217 by Noe Juárez RN  Body Position: supine  Taken 10/31/2024 0024 by Noe Juárez RN  Body Position: supine  Taken 10/30/2024 2207 by Noe Juárez RN  Body Position:   turned   left  Taken 10/30/2024 2027 by Noe Juárez RN  Body Position: supine  Skin Protection: incontinence pads utilized  Range of Motion: active ROM (range of motion) encouraged   Goal Outcome Evaluation:

## 2024-10-31 NOTE — PLAN OF CARE
Problem: Adult Inpatient Plan of Care  Goal: Absence of Hospital-Acquired Illness or Injury  Intervention: Identify and Manage Fall Risk  Recent Flowsheet Documentation  Taken 10/31/2024 1800 by Maria Guadalupe Brown RN  Safety Promotion/Fall Prevention:   safety round/check completed   nonskid shoes/slippers when out of bed   mobility aid in reach   lighting adjusted   fall prevention program maintained   clutter free environment maintained   assistive device/personal items within reach  Taken 10/31/2024 1600 by Maria Guadalupe Brown RN  Safety Promotion/Fall Prevention:   safety round/check completed   nonskid shoes/slippers when out of bed   lighting adjusted   fall prevention program maintained   clutter free environment maintained   assistive device/personal items within reach  Taken 10/31/2024 1400 by Maria Guadalupe Brown RN  Safety Promotion/Fall Prevention:   safety round/check completed   nonskid shoes/slippers when out of bed   lighting adjusted   fall prevention program maintained   clutter free environment maintained   assistive device/personal items within reach  Taken 10/31/2024 1200 by Maria Guadalupe Brown RN  Safety Promotion/Fall Prevention: safety round/check completed  Taken 10/31/2024 0820 by Maria Guadalupe Brown RN  Safety Promotion/Fall Prevention:   safety round/check completed   nonskid shoes/slippers when out of bed   lighting adjusted   fall prevention program maintained   clutter free environment maintained   assistive device/personal items within reach  Intervention: Prevent Skin Injury  Recent Flowsheet Documentation  Taken 10/31/2024 0820 by Maria Guadalupe Brown RN  Body Position: sitting up in bed  Intervention: Prevent and Manage VTE (Venous Thromboembolism) Risk  Recent Flowsheet Documentation  Taken 10/31/2024 0820 by Maria Guadalupe Brown RN  VTE Prevention/Management:   bilateral   SCDs (sequential compression devices) on  Intervention: Prevent Infection  Recent Flowsheet Documentation  Taken 10/31/2024 1800 by Maria Guadalupe Brown  RN  Infection Prevention: single patient room provided  Taken 10/31/2024 1600 by Maria Guadalupe Brown RN  Infection Prevention: single patient room provided  Taken 10/31/2024 1400 by Maria Guadalupe Brown RN  Infection Prevention: single patient room provided  Taken 10/31/2024 1200 by Maria Guadalupe Brown RN  Infection Prevention: single patient room provided  Taken 10/31/2024 1000 by Maria Guadalupe Brown RN  Infection Prevention: single patient room provided  Taken 10/31/2024 0820 by Maria Guadalupe Brown RN  Infection Prevention: single patient room provided  Goal: Optimal Comfort and Wellbeing  Intervention: Provide Person-Centered Care  Recent Flowsheet Documentation  Taken 10/31/2024 0820 by Maria Guadalupe Brown RN  Trust Relationship/Rapport: care explained     Problem: Skin Injury Risk Increased  Goal: Skin Health and Integrity  Intervention: Optimize Skin Protection  Recent Flowsheet Documentation  Taken 10/31/2024 1800 by Maria Guadalupe Brown RN  Activity Management: bedrest  Head of Bed (HOB) Positioning: HOB at 30 degrees  Taken 10/31/2024 0820 by Maria Guadalupe Brown RN  Activity Management: bedrest  Head of Bed (HOB) Positioning: HOB at 30 degrees     Problem: Restraint, Nonviolent  Goal: Absence of Harm or Injury  Intervention: Implement Least Restrictive Safety Strategies  Recent Flowsheet Documentation  Taken 10/31/2024 1800 by Maria Guadalupe Brown RN  Medical Device Protection:   IV pole/bag removed from visual field   torso covered  Diversional Activities: television  Taken 10/31/2024 1600 by Maria Guadalupe Brown RN  Medical Device Protection:   IV pole/bag removed from visual field   tubing secured  Diversional Activities: television  Taken 10/31/2024 1400 by Maria Guadalupe Brown RN  Medical Device Protection:   IV pole/bag removed from visual field   torso covered   tubing secured  Diversional Activities: television  Taken 10/31/2024 1200 by Maria Guadalupe Brown RN  Medical Device Protection:   IV pole/bag removed from visual field   torso covered   tubing secured  Diversional Activities:  television  Taken 10/31/2024 1000 by Maria Guadalupe Brown RN  Medical Device Protection:   IV pole/bag removed from visual field   torso covered   tubing secured  Diversional Activities: television  Taken 10/31/2024 0820 by Maria Guadalupe Brown RN  Medical Device Protection:   IV pole/bag removed from visual field   torso covered   tubing secured  Diversional Activities: television  Taken 10/31/2024 0800 by Maria Guadalupe Brown RN  Medical Device Protection:   IV pole/bag removed from visual field   torso covered   tubing secured  Diversional Activities: television  Intervention: Protect Dignity, Rights and Personal Wellbeing  Recent Flowsheet Documentation  Taken 10/31/2024 0820 by Maria Guadalupe Brown RN  Trust Relationship/Rapport: care explained  Intervention: Protect Skin and Joint Integrity  Recent Flowsheet Documentation  Taken 10/31/2024 0820 by Maria Guadalupe Brown RN  Body Position: sitting up in bed     Problem: Fall Injury Risk  Goal: Absence of Fall and Fall-Related Injury  Intervention: Identify and Manage Contributors  Recent Flowsheet Documentation  Taken 10/31/2024 0820 by Maria Guadalupe Brown RN  Medication Review/Management: medications reviewed  Intervention: Promote Injury-Free Environment  Recent Flowsheet Documentation  Taken 10/31/2024 1800 by Maria Guadalupe Brown RN  Safety Promotion/Fall Prevention:   safety round/check completed   nonskid shoes/slippers when out of bed   mobility aid in reach   lighting adjusted   fall prevention program maintained   clutter free environment maintained   assistive device/personal items within reach  Taken 10/31/2024 1600 by Maria Guadalupe Brown RN  Safety Promotion/Fall Prevention:   safety round/check completed   nonskid shoes/slippers when out of bed   lighting adjusted   fall prevention program maintained   clutter free environment maintained   assistive device/personal items within reach  Taken 10/31/2024 1400 by Maria Guadalupe Brown RN  Safety Promotion/Fall Prevention:   safety round/check completed   nonskid  shoes/slippers when out of bed   lighting adjusted   fall prevention program maintained   clutter free environment maintained   assistive device/personal items within reach  Taken 10/31/2024 1200 by Maria Guadalupe Brown, RN  Safety Promotion/Fall Prevention: safety round/check completed  Taken 10/31/2024 0820 by Maria Guadalupe Brown, RN  Safety Promotion/Fall Prevention:   safety round/check completed   nonskid shoes/slippers when out of bed   lighting adjusted   fall prevention program maintained   clutter free environment maintained   assistive device/personal items within reach   Goal Outcome Evaluation:   VSS throughout shift, PERRLA, no scute changes noted this shift, NIH-23, pt is nonverbal and can not follow commands at this time. NG intact, patent; pt tolerating cont feeding well. HOB elevated, call light and personal items in reach.

## 2024-10-31 NOTE — PLAN OF CARE
Goal Outcome Evaluation:              Outcome Evaluation: Speech continuing to follow pt for readiness for swallow evaluation. RN reports pt is not appropriate for PO trials. Speech to s/o at this time. Please re-consult when pt appropriate for swallow evaluation.

## 2024-10-31 NOTE — SIGNIFICANT NOTE
10/31/24 1602   OTHER   Discipline occupational therapist   Rehab Time/Intention   Session Not Performed other (see comments)  (Patient not following any commands.  Will follow up next date.)   Therapy Assessment/Plan (PT)   Criteria for Skilled Interventions Met (PT) yes   Recommendation   OT - Next Appointment 11/01/24

## 2024-10-31 NOTE — CASE MANAGEMENT/SOCIAL WORK
Continued Stay Note  [facility]     Patient Name: Albina Sosa  MRN: [id number]  Today's Date: 10/31/2024    Admit Date: 10/22/2024    Plan: Plans discharge home with assist of family and home health.   Discharge Plan       Row Name 10/31/24 0917       Plan    Plan Plans discharge home with assist of family and home health.    Patient/Family in Agreement with Plan yes    Plan Comments Discussed case with Ana/palliative care APRN. States family not intetested in palliative care, [facility] or [facility] at this time. Says they plan to take patient home with home health services. Call placed to patient's daughter Ara to discuss discharge plans/needs.Need to obtain home health selection. Await call back. Patient will need PEG tube placed before discharge. May need hospital bed? Continue to follow.....[facility]                   Discharge Codes    No documentation.                 Expected Discharge Date and Time       Expected Discharge Date Expected Discharge Time    Nov 4, 2024               Sheri Brenner RN

## 2024-10-31 NOTE — CASE MANAGEMENT/SOCIAL WORK
Continued Stay Note  [facility]     Patient Name: Albina Sosa  MRN: [id number]  Today's Date: 10/31/2024    Admit Date: 10/22/2024    Plan: Referrals placed to [facility], [facility] and [facility]. Await call back regarding acceptance/denial.   Discharge Plan       Row Name 10/31/24 1546       Plan    Plan Referrals placed to [facility], [facility] and [facility]. Await call back regarding acceptance/denial.    Plan Comments Spoke with patient's daughter. Agreeable with referrals being placed to any home health agency in network with patient's insurance.Referrals placed to [facility], [facility] and [facility]. Await call back regarding acceptance/denial. Continue to follow.....[facility]                   Discharge Codes    No documentation.                 Expected Discharge Date and Time       Expected Discharge Date Expected Discharge Time    Nov 4, 2024               Sheri Brenner RN

## 2024-10-31 NOTE — DISCHARGE PLACEMENT REQUEST
"Albina Sosa (59 y.o. Female)       Date of Birth   1965    Social Security Number       Address   [address] [place], [place] [address]    Home Phone   [phone]    MRN   [id number]       [facility]    Marital Status                               Admission Date   10/22/24    Admission Type   Emergency    Admitting Provider   Bret Lawrence MD    Attending Provider   Rajat Mathews MD    Department, Room/Bed   [address], P585/1       Discharge Date       Discharge Disposition       Discharge Destination                                 Attending Provider: Rajat Mathews MD    Allergies: Bactrim [Sulfamethoxazole-trimethoprim], Cefaclor, Flexeril [Cyclobenzaprine], Robaxin [Methocarbamol]    Isolation: None   Infection: None   Code Status: CPR    Ht: 154.9 cm (61\")   Wt: 60.2 kg (132 lb 11.5 oz)    Admission Cmt: None   Principal Problem: STEMI involving left circumflex coronary artery [I21.21]                   Active Insurance as of 10/22/2024       Primary Coverage       Payor Plan Insurance Group Employer/Plan Group    [place] [id number]       Payor Plan Address Payor Plan Phone Number Payor Plan Fax Number Effective Dates    [employer] [address] [phone]  9/1/2021 - None Entered    [facility] [id number]         Subscriber Name Subscriber Birth Date Member ID       ALBINA SOSA 1965 [id number]                     Emergency Contacts        (Rel.) Home Phone Work Phone Mobile Phone    ADRIÁN SOSA (Spouse) -- -- [phone]    Julio Hurleyssica (Daughter) -- -- [phone]                "

## 2024-10-31 NOTE — PROGRESS NOTES
"    Patient Name: Albina Sosa  :1965  59 y.o.      Patient Care Team:  Germaine James APRN as PCP - General (Nurse Practitioner)    Chief Complaint: follow up STEMI, stroke     Interval History: resting in bed. No acute distress. Cortrak in place. Tube feeding. Non verbal. Alert. Eyes open.        Objective   Vital Signs  Temp:  [97.9 °F (36.6 °C)-99 °F (37.2 °C)] 97.9 °F (36.6 °C)  Heart Rate:  [89-99] 92  Resp:  [16-20] 20  BP: (119-146)/(70-98) 121/80    Intake/Output Summary (Last 24 hours) at 10/31/2024 1051  Last data filed at 10/31/2024 0439  Gross per 24 hour   Intake 135 ml   Output 1225 ml   Net -1090 ml     Flowsheet Rows      Flowsheet Row First Filed Value   Admission Height 154.9 cm (61\") Documented at 10/22/2024 1700   Admission Weight 54 kg (119 lb 0.8 oz) Documented at 10/22/2024 1214            Physical Exam:   General Appearance:    Alert, cooperative, in no acute distress   Lungs:     Clear to auscultation.  Normal respiratory effort and rate.      Heart:    Regular rhythm and normal rate, normal S1 and S2, no murmurs, gallops or rubs.     Chest Wall:    No abnormalities observed   Abdomen:     Soft, nontender, positive bowel sounds.     Extremities:   no cyanosis, clubbing or edema.  No marked joint deformities.  Adequate musculoskeletal strength.       Results Review:    Results from last 7 days   Lab Units 10/31/24  0623   SODIUM mmol/L 138   POTASSIUM mmol/L 3.3*   CHLORIDE mmol/L 100   CO2 mmol/L 27.3   BUN mg/dL 35*   CREATININE mg/dL 1.12*   GLUCOSE mg/dL 145*   CALCIUM mg/dL 8.5*         Results from last 7 days   Lab Units 10/31/24  0623   WBC 10*3/mm3 13.13*   HEMOGLOBIN g/dL 11.7*   HEMATOCRIT % 35.7   PLATELETS 10*3/mm3 471*         Results from last 7 days   Lab Units 10/31/24  0623   MAGNESIUM mg/dL 2.4                   Medication Review:   aspirin, 81 mg, Nasogastric, Daily  atorvastatin, 40 mg, Oral, Nightly  castor oil-balsam peru, 1 Application, Topical, " Q12H  chlorhexidine, 15 mL, Mouth/Throat, Q12H  clopidogrel, 75 mg, Nasogastric, Daily  enoxaparin, 30 mg, Subcutaneous, Nightly  famotidine, 20 mg, Nasogastric, Daily  insulin glargine, 30 Units, Subcutaneous, Q12H  insulin regular, 4-24 Units, Subcutaneous, Q4H  metoprolol tartrate, 12.5 mg, Oral, Q12H  potassium chloride, 40 mEq, Nasogastric, BID  torsemide, 40 mg, Nasogastric, Daily  venlafaxine, 37.5 mg, Nasogastric, BID         milrinone, 0.25 mcg/kg/min, Last Rate: 0.25 mcg/kg/min (10/31/24 0152)        Assessment & Plan   STEMI with history of coronary artery disease and CABG and previous percutaneous interventions to the circ and left sided PDA - 100% stenosis of mid marginal branch of the circ at the distal stent edge. Felt to be culprit and treated with 2.25 x 12 mm Xience drug eluting stent 10/22/24. On ASA and clopidogrel.   Acute left MCA stroke status post TNK and mechanical thrombectomy 10/22/24  Severe ischemic cardiomyopathy , EF 20-25% by TTE 10/23/24. Repeat limited echo 10/28/24 unchanged.  No obvious LV thrombus with echo contrast images.   Acute hypoxic respiratory failure , requiring mechanical ventilation. Extubated 10/25/24.  Acute HFrEF due to severe ischemic cardiomyopathy, LVEDP 40 mmHg. IV diuretics stopped 10/29. Diuretic holiday 10/30. Oral diuretics started today. Minimal GDMT due to marginal hemodynamics. Start to wean milrinone today. Likely turn it off tomorrow.   Diabetes mellitus type II on chronic insulin with circulatory complication  Acute on chronic kidney disease  Tobacco abuse   Chronic lung disease/COPD  History of methamphetamine use.     Family has declined palliative. Agreeable to PEG. Management per internal medicine.   Wean milrinone.   Diuretics per nephrology.    BULMARO Delgado  [facility]  10/31/24  10:51 EDT

## 2024-10-31 NOTE — SIGNIFICANT NOTE
10/31/24 1526   OTHER   Discipline physical therapy assistant   Rehab Time/Intention   Session Not Performed other (see comments)  (checked on pt PM. Pt not appropriate for PT. Pt unable to follow simple commands. Pt would wake briefly then close eyes. RN aware. Will continue to follow pt.)   Recommendation   PT - Next Appointment 11/01/24

## 2024-10-31 NOTE — PROGRESS NOTES
Nutrition Services    Patient Name: Albina Sosa  YOB: 1965  MRN: [id number]  Admission date: 10/22/2024    PROGRESS NOTE      Encounter Information: Pt tolerating Novasource Renal well at goal rate per RN.        PO Diet: NPO Diet NPO Type: Tube Feeding   PO Supplements: -   PO Intake:  -       Current nutrition support: Novasource Renal at 35 ml/hr goal rate   Nutrition support review: Tolerating       Labs (reviewed below): Na 138, K 3.3       GI Function:  Last BM 10/27       Nutrition Intervention Updates: Continue current TF regimen  Continue flushing Satish BID       Results from last 7 days   Lab Units 10/31/24  0623 10/30/24  0526 10/29/24  0328 10/28/24  1445 10/28/24  0402 10/27/24  0742 10/26/24  2015   SODIUM mmol/L 138 141 145  --  152*   < > 147*   POTASSIUM mmol/L 3.3* 3.5 4.1  4.1   < > 3.9   < > 4.0   CHLORIDE mmol/L 100 98 108*  --  113*   < > 109*   CO2 mmol/L 27.3 30.7* 28.6  --  28.1   < > 28.2   BUN mg/dL 35* 46* 43*  --  50*   < > 38*   CREATININE mg/dL 1.12* 1.26* 1.50*  --  1.36*   < > 1.39*   CALCIUM mg/dL 8.5* 8.3* 8.4*  --  8.5*   < > 9.2   BILIRUBIN mg/dL  --  0.5  --   --  0.3  --  0.3   ALK PHOS U/L  --  185*  --   --  159*  --  159*   ALT (SGPT) U/L  --  33  --   --  34*  --  38*   AST (SGOT) U/L  --  41*  --   --  62*  --  51*   GLUCOSE mg/dL 145* 165* 144*  --  131*   < > 184*    < > = values in this interval not displayed.     Results from last 7 days   Lab Units 10/31/24  0623 10/30/24  0526   MAGNESIUM mg/dL 2.4 2.6   PHOSPHORUS mg/dL 3.1 3.0   HEMOGLOBIN g/dL 11.7* 12.7   HEMATOCRIT % 35.7 37.7     SARS-CoV-2, SALVADOR   Date Value Ref Range Status   04/29/2022 NEGATIVE Negative Final     Comment:     The 2019-CoV rRT-PCR Assay is only for use under a Food and Drug Administration Emergency Use Authorization. The performance characteristics of the assay were verified by the Clinical Laboratory at [facility]. Results should be used in   conjunction with  the patient's clinical symptoms, medical history, and other clinical/laboratory findings to determine an overall clinical diagnosis. Negative results do not preclude infection with SARS-CoV-2 (COVID-19).    Test parameters have not been validated for screening asymptomatic patients.     Lab Results   Component Value Date    HGBA1C 14.70 (H) 10/23/2024       Wt Readings from Last 10 Encounters:   10/30/24 0600 60.2 kg (132 lb 11.5 oz)   10/29/24 0438 60.2 kg (132 lb 11.5 oz)   10/28/24 1705 49 kg (108 lb)   10/28/24 0640 49.3 kg (108 lb 11 oz)   10/26/24 0200 49.1 kg (108 lb 3.9 oz)   10/24/24 0500 51.1 kg (112 lb 10.5 oz)   10/23/24 1252 56.2 kg (124 lb)   10/23/24 0238 56.5 kg (124 lb 9 oz)   10/22/24 1700 54 kg (119 lb 0.8 oz)   10/22/24 1214 54 kg (119 lb 0.8 oz)       RD to follow up per protocol.    Electronically signed by:  Ernie Engle RDN, LD  10/31/24 09:42 EDT

## 2024-10-31 NOTE — PROGRESS NOTES
[facility] Progress Note      Patient Name: Albina Sosa  : 1965  MRN: [id number]  Primary Care Physician:  Germaine James APRN  Date of admission: 10/22/2024    Subjective     Interval History:   Follow-up acute kidney injury and hypernatremia.  Does open her eyes and make eye contact but does not follow any commands.  Not verbal.  Blood pressure more stable during the day yesterday.  On room air.  Review of Systems:   As noted above    Objective     Vitals:   Temp:  [97.9 °F (36.6 °C)-99 °F (37.2 °C)] 97.9 °F (36.6 °C)  Heart Rate:  [89-99] 92  Resp:  [16-20] 20  BP: (119-146)/(70-98) 121/80  Flow (L/min) (Oxygen Therapy):  [3] 3    Intake/Output Summary (Last 24 hours) at 10/31/2024 1016  Last data filed at 10/31/2024 0439  Gross per 24 hour   Intake 135 ml   Output 1225 ml   Net -1090 ml       Physical Exam:    General Appearance: Restless.  Not tachypneic.  Does make eye contact but does not follow any commands.  Not verbal  Skin: warm and dry  HEENT: oral mucosa dry.  Nasal oxygen.  Core track in place, nonicteric sclera  Neck: supple, no JVD  Lungs: Coarse upper airway rhonchi.  Heart: RRR, normal S1 and S2  Abdomen: soft, nontender, nondistended. +bowel sounds.  Body wall edema.  : no palpable bladder  Extremities: 1+ upper and lower extremity edema.  Neuro: Not verbal.    Scheduled Meds:     aspirin, 81 mg, Nasogastric, Daily  atorvastatin, 40 mg, Oral, Nightly  castor oil-balsam peru, 1 Application, Topical, Q12H  chlorhexidine, 15 mL, Mouth/Throat, Q12H  clopidogrel, 75 mg, Nasogastric, Daily  enoxaparin, 30 mg, Subcutaneous, Nightly  famotidine, 20 mg, Nasogastric, Daily  insulin glargine, 30 Units, Subcutaneous, Q12H  insulin regular, 4-24 Units, Subcutaneous, Q4H  metoprolol tartrate, 12.5 mg, Oral, Q12H  venlafaxine, 37.5 mg, Nasogastric, BID      IV Meds:   milrinone, 0.25 mcg/kg/min, Last Rate: 0.25 mcg/kg/min (10/31/24 0152)        Results Reviewed:   I  have personally reviewed the results from the time of this admission to 10/31/2024 10:16 EDT     Results from last 7 days   Lab Units 10/31/24  0623 10/30/24  0526 10/29/24  0328 10/28/24  1445 10/28/24  0402 10/27/24  0742 10/26/24  2015   SODIUM mmol/L 138 141 145  --  152*   < > 147*   POTASSIUM mmol/L 3.3* 3.5 4.1  4.1   < > 3.9   < > 4.0   CHLORIDE mmol/L 100 98 108*  --  113*   < > 109*   CO2 mmol/L 27.3 30.7* 28.6  --  28.1   < > 28.2   BUN mg/dL 35* 46* 43*  --  50*   < > 38*   CREATININE mg/dL 1.12* 1.26* 1.50*  --  1.36*   < > 1.39*   CALCIUM mg/dL 8.5* 8.3* 8.4*  --  8.5*   < > 9.2   BILIRUBIN mg/dL  --  0.5  --   --  0.3  --  0.3   ALK PHOS U/L  --  185*  --   --  159*  --  159*   ALT (SGPT) U/L  --  33  --   --  34*  --  38*   AST (SGOT) U/L  --  41*  --   --  62*  --  51*   GLUCOSE mg/dL 145* 165* 144*  --  131*   < > 184*    < > = values in this interval not displayed.       Estimated Creatinine Clearance: 45.6 mL/min (A) (by C-G formula based on SCr of 1.12 mg/dL (H)).    Results from last 7 days   Lab Units 10/31/24  0623 10/30/24  0526 10/29/24  0328   MAGNESIUM mg/dL 2.4 2.6  --    PHOSPHORUS mg/dL 3.1 3.0 3.0       Results from last 7 days   Lab Units 10/28/24  0402   URIC ACID mg/dL 7.7*       Results from last 7 days   Lab Units 10/31/24  0623 10/30/24  0526 10/28/24  2353 10/28/24  0402 10/27/24  0527   WBC 10*3/mm3 13.13* 13.60* 14.60* 14.30* 12.28*   HEMOGLOBIN g/dL 11.7* 12.7 11.9* 11.4* 12.4   PLATELETS 10*3/mm3 471* 442 465* 395 486*               Assessment / Plan     ASSESSMENT:  Acute kidney injury and hypernatremia.  Waste products improved today.  Likely cardiorenal.  Sodium normal today with cortrak free water.   ST elevation MI.  Severe heart failure reduced ejection fraction with calculated EF 14%.  Underwent heart cath 10/22/2024.  Status post mid marginal branch PCI and stent.  Severe chronic disease through LAD,  circumflex.   Post cardiac cath left MCA CVA.  Interval left  cerebellar stroke.  Status post TNK and thrombectomy.  Significant residual deficit.  4.  Diabetes mellitus type 2  5.  Sacral pressure injury deep tissue coccyx wound.  PLAN:  Replace potassium per core track.  Diuretic today.  Thank you for involving us in the care of Albina Sosa.  Please feel free to call with any questions.    Aline Vital MD  10/31/24  10:16 EDT    [facility]  [phone]    Please note that portions of this note were completed with a voice recognition program.

## 2024-11-01 PROBLEM — I69.391 DYSPHAGIA AS LATE EFFECT OF CEREBROVASCULAR ACCIDENT (CVA): Status: ACTIVE | Noted: 2024-01-01

## 2024-11-01 NOTE — PROGRESS NOTES
Nutrition Services    Patient Name: Albina oSsa  YOB: 1965  MRN: [id number]  Admission date: 10/22/2024    PROGRESS NOTE      Encounter Information: Pt tolerating Novasource Renal well at goal rate per RN.        PO Diet: NPO Diet NPO Type: Tube Feeding   PO Supplements: -   PO Intake:  -       Current nutrition support: Novasource Renal at 35 ml/hr goal rate   Nutrition support review: Tolerating       Labs (reviewed below): Na 135       GI Function:  Last BM 10/27       Nutrition Intervention Updates: Continue current TF regimen  Continue flushing Satish BID  Decrease free water to 50 ml q 4 hrs.        Results from last 7 days   Lab Units 11/01/24  0554 10/31/24  0623 10/30/24  0526 10/28/24  1445 10/28/24  0402   SODIUM mmol/L 135* 138 141   < > 152*   POTASSIUM mmol/L 4.3 3.3* 3.5   < > 3.9   CHLORIDE mmol/L 100 100 98   < > 113*   CO2 mmol/L 23.3 27.3 30.7*   < > 28.1   BUN mg/dL 33* 35* 46*   < > 50*   CREATININE mg/dL 0.99 1.12* 1.26*   < > 1.36*   CALCIUM mg/dL 8.6 8.5* 8.3*   < > 8.5*   BILIRUBIN mg/dL 0.3  --  0.5  --  0.3   ALK PHOS U/L 165*  --  185*  --  159*   ALT (SGPT) U/L 32  --  33  --  34*   AST (SGOT) U/L 46*  --  41*  --  62*   GLUCOSE mg/dL 160* 145* 165*   < > 131*    < > = values in this interval not displayed.     Results from last 7 days   Lab Units 11/01/24  0554 10/31/24  0623 10/30/24  0526   MAGNESIUM mg/dL 2.6 2.4 2.6   PHOSPHORUS mg/dL 2.9 3.1 3.0   HEMOGLOBIN g/dL 12.0 11.7* 12.7   HEMATOCRIT % 35.8 35.7 37.7     SARS-CoV-2, SALVADOR   Date Value Ref Range Status   04/29/2022 NEGATIVE Negative Final     Comment:     The 2019-CoV rRT-PCR Assay is only for use under a Food and Drug Administration Emergency Use Authorization. The performance characteristics of the assay were verified by the Clinical Laboratory at [facility]. Results should be used in   conjunction with the patient's clinical symptoms, medical history, and other clinical/laboratory findings to  determine an overall clinical diagnosis. Negative results do not preclude infection with SARS-CoV-2 (COVID-19).    Test parameters have not been validated for screening asymptomatic patients.     Lab Results   Component Value Date    HGBA1C 14.70 (H) 10/23/2024       Wt Readings from Last 10 Encounters:   10/30/24 0600 60.2 kg (132 lb 11.5 oz)   10/29/24 0438 60.2 kg (132 lb 11.5 oz)   10/28/24 1705 49 kg (108 lb)   10/28/24 0640 49.3 kg (108 lb 11 oz)   10/26/24 0200 49.1 kg (108 lb 3.9 oz)   10/24/24 0500 51.1 kg (112 lb 10.5 oz)   10/23/24 1252 56.2 kg (124 lb)   10/23/24 0238 56.5 kg (124 lb 9 oz)   10/22/24 1700 54 kg (119 lb 0.8 oz)   10/22/24 1214 54 kg (119 lb 0.8 oz)       RD to follow up per protocol.    Electronically signed by:  Ernie Engle RDN, LD  11/01/24 09:40 EDT

## 2024-11-01 NOTE — PROGRESS NOTES
[facility] Progress Note       Encounter Date:24  Patient:Albina Sosa  :1965  MRN:[id number]      Chief Complaint: Follow-up STEMI      Subjective:        No cardiac issues overnight.  Slowly weaning milrinone      Review of Systems:  Review of Systems   Unable to perform ROS: Patient nonverbal       Medications:  Scheduled Meds:  aspirin, 81 mg, Nasogastric, Daily  atorvastatin, 40 mg, Oral, Nightly  castor oil-balsam peru, 1 Application, Topical, Q12H  cefTRIAXone, 1,000 mg, Intravenous, Q24H  chlorhexidine, 15 mL, Mouth/Throat, Q12H  clopidogrel, 75 mg, Nasogastric, Daily  enoxaparin, 30 mg, Subcutaneous, Nightly  famotidine, 20 mg, Nasogastric, Daily  insulin glargine, 30 Units, Subcutaneous, Q12H  insulin regular, 2 Units, Subcutaneous, Q6H  insulin regular, 4-24 Units, Subcutaneous, Q4H  metoprolol tartrate, 12.5 mg, Oral, Q12H  torsemide, 40 mg, Nasogastric, Daily  venlafaxine, 37.5 mg, Nasogastric, BID    Continuous Infusions:  milrinone, 0.125 mcg/kg/min, Last Rate: 0.125 mcg/kg/min (10/31/24 1315)    PRN Meds:    acetaminophen    dextrose    dextrose    glucagon (human recombinant)    hydrALAZINE    HYDROcodone-acetaminophen    ipratropium-albuterol    ipratropium-albuterol    loperamide    nitroglycerin    OLANZapine    ondansetron ODT **OR** ondansetron    [COMPLETED] Insert Peripheral IV **AND** sodium chloride         Objective:       Vitals:    10/31/24 2307 24 0312 24 0725 24 1304   BP: 102/71 98/62 110/71 104/78   BP Location: Left arm Left arm Left arm Right arm   Patient Position: Lying Lying Lying Lying   Pulse: 90 88 95 82   Resp: 18 18 18 16   Temp: 98.2 °F (36.8 °C) 98 °F (36.7 °C) 97.9 °F (36.6 °C) 98.1 °F (36.7 °C)   TempSrc: Oral Axillary Oral Oral   SpO2: 95% 96% 90% 97%   Weight:       Height:               Physical Exam:  Constitutional: Chronically ill-appearing, frail, no acute distress   HENT: Oropharynx clear and membrane moist, NG  "in place  Eyes: Normal conjunctiva, no sclera icterus.  Neck: Supple, no carotid bruit bilaterally.  Cardiovascular: Regular and Tachycardia rate and rhythm, No Murmur, No bilateral lower extremity edema.  Pulmonary: Normal respiratory effort, coarse lung sounds, no wheezing.  Abdominal: Soft, nontender, no hepatosplenomegaly, liver is non-pulsatile.  Neurological: Still fairly sleepy not following commands well does move L leg  Skin: Warm, dry, no ecchymosis, no rash.  Psych: Unable to assess.           Lab Review:   Results from last 7 days   Lab Units 11/01/24  0554 10/31/24  0623 10/30/24  0526 10/29/24  0328 10/28/24  1445 10/28/24  0402 10/27/24  0742 10/26/24  2015   SODIUM mmol/L 135* 138 141 145  --  152* 150* 147*   POTASSIUM mmol/L 4.3 3.3* 3.5 4.1  4.1 3.9 3.9 4.0 4.0   CHLORIDE mmol/L 100 100 98 108*  --  113* 110* 109*   CO2 mmol/L 23.3 27.3 30.7* 28.6  --  28.1 29.0 28.2   BUN mg/dL 33* 35* 46* 43*  --  50* 53* 38*   CREATININE mg/dL 0.99 1.12* 1.26* 1.50*  --  1.36* 1.60* 1.39*   GLUCOSE mg/dL 160* 145* 165* 144*  --  131* 197* 184*   CALCIUM mg/dL 8.6 8.5* 8.3* 8.4*  --  8.5* 9.0 9.2   AST (SGOT) U/L 46*  --  41*  --   --  62*  --  51*   ALT (SGPT) U/L 32  --  33  --   --  34*  --  38*           Results from last 7 days   Lab Units 11/01/24  0554 10/31/24  0623 10/30/24  0526 10/28/24  2353 10/28/24  0402 10/27/24  0527 10/26/24  0307   WBC 10*3/mm3 17.03* 13.13* 13.60* 14.60* 14.30* 12.28* 11.13*   HEMOGLOBIN g/dL 12.0 11.7* 12.7 11.9* 11.4* 12.4 12.4   HEMATOCRIT % 35.8 35.7 37.7 36.9 34.3 38.9 36.6   PLATELETS 10*3/mm3 464* 471* 442 465* 395 486* 359           Results from last 7 days   Lab Units 11/01/24  0554 10/31/24  0623 10/30/24  0526   MAGNESIUM mg/dL 2.6 2.4 2.6           Invalid input(s): \"LDLCALC\"    Results from last 7 days   Lab Units 11/01/24  0554   PROBNP pg/mL 17,419.0*       Results from last 7 days   Lab Units 10/26/24  1025   TSH uIU/mL 0.349       Echocardiogram " 10/23/2024:  Left ventricular systolic function is severely decreased. Calculated left ventricular EF = 14.2%, visually estimated LVEF 20-25%.  There is global hypokinesis.  Additionally, the following left ventricular wall segments are akinetic: basal anterolateral, mid anterolateral, apical lateral, basal inferolateral, mid inferolateral and apex.  Left ventricular wall thickness is consistent with moderate concentric hypertrophy.  Left ventricular mass index is increased.  May consider infiltrative cardiomyopathy in the appropriate clinical setting.  No obvious LV thrombus noted including on echo contrast images.  Mild-moderate mitral regurgitation.  Borderline left atrial enlargement, normal RA and IVC size.  There is a small (<1cm) pericardial effusion. There is no evidence of cardiac tamponade.    Cardiac Catheterization 10/22/2024:  Severe multivessel coronary artery disease with 100% ostial LAD, circumflex contains 100% stenosis of the mid marginal branch at the distal stent edge likely culprit for STEMI presentation, left-sided PDA from the circumflex has 100% proximal segment stenoses with left to left collaterals from a LIMA to LAD supplying the distal PDA, patent LIMA to LAD LAD is diffusely diseased small in caliber size with sequential 80% stenoses.  LVEDP of approximately 30 mmHg  Ejection fraction 10%  Successful PCI to mid marginal with placement of a 2.25 x 12 mm Xience drug-eluting stent deployed initially at 8 keon with her stent balloon back slightly postdilated to 16 at  [place] to right femoral artery  Patient developed acute neurologic changes at the end of the case when we went to load her with aspirin and Plavix.  She was noted to be fairly unresponsive even after reversal with Narcan and flumazenil code stroke was called she was assessed emergently and taken off her head CT       Assessment:          Diagnosis Plan   1. Acute ST elevation myocardial infarction (STEMI), unspecified artery   Ambulatory Referral to Cardiac Rehab      2. Smoker        3. H/O medication noncompliance        4. Acute pulmonary edema               Plan:       Ms. Sosa is a 59 y.o. woman with past medical history notable for coronary disease status post LIMA to LAD percutaneous interventions to her circumflex and left-sided PDA, hypertension, mixed hyperlipidemia, diabetes type 2 on insulin therapy, history of stroke, history of methamphetamine use, and ongoing tobacco abuse with underlying lung disease who presented to the emergency room with a day or 2 of acute onset chest discomfort on 10/22 was noted to have ST changes concerning for STEMI.  She was taken emergently to the Cath Lab where she was found to have multivessel disease some chronic and on her mid marginal branch which fit with her EKG changes was felt to be the acute lesion and behaved so.  This was revascularized and stent placed.  She was also noted to have occlusion of her left dominant circumflex however this behave more chronic given that there was already collateralization and wire would not pass.  She was also found to have severe cardiomyopathy which is new compared to at least stress findings from 2 years ago.  Unfortunately patient had a stroke symptoms at the end of the case she was taken emergently had TNK and subsequently taken for thrombectomy despite that still had a fairly sizable stroke on the left side.  Patient was intubated for approximately 5 days after her infarct and stroke.  Unfortunately neurostatus has not improved and has remained severely debilitated.  Palliative care discussions were held 10/30 family is wanting to do anything possible to allow her to recover.  They would be okay with PEG tube.  From a cardiac standpoint doing what we can with limited options given the severe damage that patient had with her heart and underlying dysfunction.  She was placed on milrinone infusion on 10/27 she is responded reasonably well to  diuresis.  She has been transitioned over to oral diuretics per nephrology recommendations.  Also trying to decrease her intake with free water flushes now that her sodium is better.  Appreciate their assistance with managing this..  She has responded well with good urine output with this along with diuresis.  Is also improving.  Like to try and discontinue her milrinone and monitor response to this.        STEMI:  Mid marginal branch felt to be culprit however patient with severe chronic disease throughout the LAD and circumflex and left-sided PDA  Status post PCI 10/22  Was loaded with aspirin and clopidogrel in the Cath Lab 10/2022  Continue aspirin and clopidogrel  Continue low-dose metoprolol  Follow-up echocardiogram 10/28 does not show clear evidence of left ventricular.  Would not be a great candidate for anticoagulation at this juncture specially in light of high risk for hemorrhagic conversion of her stroke    Stroke:  Likely cardioembolic unclear if from left ventricular thrombus or clot on the catheter  Status post TNK and thrombectomy  Neurology following  Now on aspirin and clopidogrel  Limited to no improvement neurologically  Would likely need a PEG tube given family's decision and goals of care    Acute systolic congestive heart failure:  Severely elevated LVEDP of 40 mmHg in the Cath Lab  Continue diuresis  Milrinone 0.25 mcg started 10/27 to a lower dose 10/31 we will stop today and monitor response.    Diabetes type 2:   Elevated blood sugars noted but no evidence of acidosis think it is more of a hyperglycemia  Appreciate critical care assistance with managing her severe hyperglycemia     Acute hyponatremia:  Has improved and now hypernatremic treating with free water flushes appreciate renal insight             Bret Lawrence MD  [facility]  11/01/24  14:01 EDT

## 2024-11-01 NOTE — PLAN OF CARE
Problem: Adult Inpatient Plan of Care  Goal: Absence of Hospital-Acquired Illness or Injury  Intervention: Identify and Manage Fall Risk  Recent Flowsheet Documentation  Taken 11/1/2024 1800 by Maria Guadalupe Brown RN  Safety Promotion/Fall Prevention:   safety round/check completed   nonskid shoes/slippers when out of bed   lighting adjusted   fall prevention program maintained   clutter free environment maintained   assistive device/personal items within reach  Taken 11/1/2024 1600 by Maria Guadalupe Brown RN  Safety Promotion/Fall Prevention:   safety round/check completed   nonskid shoes/slippers when out of bed   lighting adjusted   fall prevention program maintained   clutter free environment maintained   assistive device/personal items within reach  Taken 11/1/2024 1400 by Maria Guadalupe Brown RN  Safety Promotion/Fall Prevention:   safety round/check completed   nonskid shoes/slippers when out of bed  Taken 11/1/2024 1200 by Maria Guadalupe Brown RN  Safety Promotion/Fall Prevention:   safety round/check completed   nonskid shoes/slippers when out of bed   lighting adjusted   fall prevention program maintained   clutter free environment maintained   assistive device/personal items within reach  Taken 11/1/2024 0800 by Maria Guadalupe Brown RN  Safety Promotion/Fall Prevention:   safety round/check completed   nonskid shoes/slippers when out of bed   fall prevention program maintained   clutter free environment maintained   assistive device/personal items within reach  Intervention: Prevent Skin Injury  Recent Flowsheet Documentation  Taken 11/1/2024 0800 by Maria Guadalupe Brown RN  Body Position: position changed independently  Intervention: Prevent and Manage VTE (Venous Thromboembolism) Risk  Recent Flowsheet Documentation  Taken 11/1/2024 0800 by Maria Guadalupe Brown RN  VTE Prevention/Management:   bilateral   SCDs (sequential compression devices) on  Intervention: Prevent Infection  Recent Flowsheet Documentation  Taken 11/1/2024 1800 by Maria Guadalupe Brown  RN  Infection Prevention: single patient room provided  Taken 11/1/2024 1600 by Maria Guadalupe Brown RN  Infection Prevention: single patient room provided  Taken 11/1/2024 1200 by Maria Guadalupe Brown RN  Infection Prevention: single patient room provided  Taken 11/1/2024 0800 by Maria Guadalupe Brown RN  Infection Prevention: single patient room provided  Goal: Optimal Comfort and Wellbeing  Intervention: Provide Person-Centered Care  Recent Flowsheet Documentation  Taken 11/1/2024 0800 by Maria Guadalupe Brown RN  Trust Relationship/Rapport: care explained   Goal Outcome Evaluation:   VSS this shift, no acute changes noted, NG tube patent and intact, pt tolerated tube feeding well. HOB elevated, call light and personal items in reach. Tube feeding to be turned off at midnight, staff to be updated.

## 2024-11-01 NOTE — PROGRESS NOTES
Name: Albina Sosa ADMIT: 10/22/2024   : 1965  PCP: Germaine James APRN    MRN: [id number] LOS: 10 days   AGE/SEX: 59 y.o. female  ROOM: [facility]     Subjective   Subjective   Patient seen this morning.  No acute events overnight.  RN present at bedside, no changes.  Patient remains confused, nonverbal, does not follow commands.  Right upper and lower Smiddy weakness unchanged.    Review of Systems   UTO  Objective   Objective   Vital Signs  Temp:  [97.9 °F (36.6 °C)-98.2 °F (36.8 °C)] 97.9 °F (36.6 °C)  Heart Rate:  [77-95] 95  Resp:  [18-20] 18  BP: ()/(62-86) 110/71  SpO2:  [90 %-97 %] 90 %  on  Flow (L/min) (Oxygen Therapy):  [3] 3;   Device (Oxygen Therapy): nasal cannula  Body mass index is 25.08 kg/m².  Physical Exam    General: Alert in bed, confused, nonverbal, ill-appearing,  HEENT: Normocephalic, atraumatic  CV: Regular rate and rhythm, no murmurs rubs or gallops  Lungs: Coarse, no wheezing, nonlabored breathing,  Abdomen: Soft, nontender, nondistended  Extremities: Bilateral lower extremity edema, no cyanosis     Results Review     I reviewed the patient's new clinical results.  Results from last 7 days   Lab Units 2454 10/31/24  0623 10/30/24  0526 10/28/24  2353   WBC 10*3/mm3 17.03* 13.13* 13.60* 14.60*   HEMOGLOBIN g/dL 12.0 11.7* 12.7 11.9*   PLATELETS 10*3/mm3 464* 471* 442 465*     Results from last 7 days   Lab Units 24  0554 10/31/24  0623 10/30/24  0526 10/29/24  0328   SODIUM mmol/L 135* 138 141 145   POTASSIUM mmol/L 4.3 3.3* 3.5 4.1  4.1   CHLORIDE mmol/L 100 100 98 108*   CO2 mmol/L 23.3 27.3 30.7* 28.6   BUN mg/dL 33* 35* 46* 43*   CREATININE mg/dL 0.99 1.12* 1.26* 1.50*   GLUCOSE mg/dL 160* 145* 165* 144*   Estimated Creatinine Clearance: 51.6 mL/min (by C-G formula based on SCr of 0.99 mg/dL).  Results from last 7 days   Lab Units 24  0554 10/31/24  0623 10/30/24  0526 10/29/24  0328 10/28/24  0402 10/27/24  0742 10/26/24  2015   ALBUMIN g/dL  2.4* 2.4* 2.5* 2.6* 2.8*   < > 3.0*   BILIRUBIN mg/dL 0.3  --  0.5  --  0.3  --  0.3   ALK PHOS U/L 165*  --  185*  --  159*  --  159*   AST (SGOT) U/L 46*  --  41*  --  62*  --  51*   ALT (SGPT) U/L 32  --  33  --  34*  --  38*    < > = values in this interval not displayed.     Results from last 7 days   Lab Units 11/01/24  0554 10/31/24  0623 10/30/24  0526 10/29/24  0328   CALCIUM mg/dL 8.6 8.5* 8.3* 8.4*   ALBUMIN g/dL 2.4* 2.4* 2.5* 2.6*   MAGNESIUM mg/dL 2.6 2.4 2.6  --    PHOSPHORUS mg/dL 2.9 3.1 3.0 3.0     Results from last 7 days   Lab Units 11/01/24  0554   PROCALCITONIN ng/mL 0.13     SARS-CoV-2, SALVADOR   Date Value Ref Range Status   04/29/2022 NEGATIVE Negative Final     Comment:     The 2019-CoV rRT-PCR Assay is only for use under a Food and Drug Administration Emergency Use Authorization. The performance characteristics of the assay were verified by the Clinical Laboratory at [facility]. Results should be used in   conjunction with the patient's clinical symptoms, medical history, and other clinical/laboratory findings to determine an overall clinical diagnosis. Negative results do not preclude infection with SARS-CoV-2 (COVID-19).    Test parameters have not been validated for screening asymptomatic patients.     Glucose   Date/Time Value Ref Range Status   11/01/2024 1213 216 (H) 70 - 130 mg/dL Final   11/01/2024 0728 191 (H) 70 - 130 mg/dL Final   11/01/2024 0308 159 (H) 70 - 130 mg/dL Final   10/31/2024 2312 228 (H) 70 - 130 mg/dL Final   10/31/2024 2014 251 (H) 70 - 130 mg/dL Final   10/31/2024 1703 230 (H) 70 - 130 mg/dL Final   10/31/2024 1203 227 (H) 70 - 130 mg/dL Final           XR Chest 1 View  Narrative: XR CHEST 1 VW-     INDICATION: Hypoxia     COMPARISON: Chest radiographs dating back to 10/22/2024.     Impression: Central pulmonary vasculature remains dilated and indistinct  with very prominent pulmonary tissue markings bilaterally suggesting  pulmonary edema. Findings  have mildly worsened since most recent  radiograph. No measurable pleural effusion or pneumothorax. Cardiac  silhouette remains at the upper limits of normal with postsurgical  changes of CABG. Enteric tube courses below the diaphragm and outside  the exam field-of-view.     This report was finalized on 11/1/2024 7:44 AM by Dr. Jarret Johnson M.D on Workstation: [id number]       Scheduled Medications  aspirin, 81 mg, Nasogastric, Daily  atorvastatin, 40 mg, Oral, Nightly  castor oil-balsam peru, 1 Application, Topical, Q12H  cefTRIAXone, 1,000 mg, Intravenous, Q24H  chlorhexidine, 15 mL, Mouth/Throat, Q12H  clopidogrel, 75 mg, Nasogastric, Daily  enoxaparin, 30 mg, Subcutaneous, Nightly  famotidine, 20 mg, Nasogastric, Daily  insulin glargine, 30 Units, Subcutaneous, Q12H  insulin regular, 2 Units, Subcutaneous, Q6H  insulin regular, 4-24 Units, Subcutaneous, Q4H  metoprolol tartrate, 12.5 mg, Oral, Q12H  torsemide, 40 mg, Nasogastric, Daily  venlafaxine, 37.5 mg, Nasogastric, BID    Infusions  milrinone, 0.125 mcg/kg/min, Last Rate: 0.125 mcg/kg/min (10/31/24 1315)    Diet  NPO Diet NPO Type: Tube Feeding    I have personally reviewed     [x]  Laboratory   [x]  Microbiology   [x]  Radiology   [x]  EKG/Telemetry  []  Cardiology/Vascular   []  Pathology    []  Records       Assessment/Plan     Active Hospital Problems    Diagnosis  POA    **STEMI involving left circumflex coronary artery [I21.21]  Yes    Hyperlipemia [E78.5]  Unknown    Acute hypoxic respiratory failure [J96.01]  Unknown    COPD (chronic obstructive pulmonary disease) [J44.9]  Unknown    Type 2 diabetes mellitus with hyperglycemia [E11.65]  Unknown    Ischemic cardiomyopathy [I25.5]  Unknown    Elevated liver enzymes [R74.8]  Unknown    Polysubstance abuse [F19.10]  Unknown    Peripheral vascular disease [I73.9]  Unknown    Acute ischemic left middle cerebral artery (MCA) stroke [I63.512]  No      Resolved Hospital Problems   No resolved  problems to display.       59 y.o. female  admitted to [facility] via EMS 10/22/24 for STEMI. She has history of coronary artery disease with CAB with LIMA to the LAD, with hx of PCI to circumflex and left PDA, hypertension, mixed hyperlipidemia, type 2 diabetes on insulin therapy, prior CVA, history of methamphetamine use, and ongoing tobacco use with underlying lung disease.  She underwent emergent coronary catheterization and was found to have multivessel disease with severe cardiomyopathy and had a PCI with stent to culprit distal marginal branch with residual diffuse LAD and PDA disease with collaterals.  EF 14% by echo without obvious thrombus.  Post PCI she was noted to have increased agitation and right sided weakness with aphasia and stat neurology consult was placed and team D was activated.  CT of the head showed no acute hemorrhage or hypodensity, CTA of the head and neck was not performed at that time due to agitation.  Patient was given TNK and sent for angiogram out of concern for left MCA disease and had mechanical cranial artery embolectomy.          Acute large left MCA ischemic stroke  - status post TNK and embolectomy  -dual antiplatelet therapy and statin therapy  -Neurology is recommending BP systolic 140 or less  with strict blood pressure control to prevent hemorrhagic transformation  -Continue therapies PT and OT  -Remains confused, nonverbal, does not follow commands.+ Right upper and lower extremity weakness    Dysphagia secondary to above  -Tube feeds via core track  -Consult general surgery for evaluation of PEG tube placement.  Will need preoperative assessment per cardiology secondary to severe ischemic cardiomyopathy.         Acute STEMI  History of CAD/CABG  Severe ischemic cardiomyopathy  Acute on chronic heart failure with reduced ejection fraction  -Cardiac cath with multivessel disease with PCI/stent to mid marginal branch 10/22/2024  -Echocardiogram 10/28/2024 showed EF of 21-25%,  global hypokinesis  -on aspirin and Plavix, statin as above  -Metoprolol  -On diuresis per nephrology  -Milrinone drip per cardiology       Hyperlipidemia  -atorvastatin  -goal LDL less than 70      Acute hypoxic respiratory failure/COPD  -Initially requiring ventilator support . Liberated from ventilator 10/25 now on 3 L nasal cannula  -Continue nebulizer with good pulmonary hygiene.   -Diuresis per nephrology       Type 2 diabetes, uncontrolled hyperglycemia   -A1c on admit 14.7  -Sugars over 600 on admission  -unclear on her adherence and DM regimen as an outpatient  -Blood glucose not well-controlled.  Continue glargine 4 units twice daily, continue Humalog SSI.  Add scheduled Humulin 2 units every 6 hours.         History of tobacco use disorder   -Recommend full cessation of tobacco     Hyponatremia  NICHO on CKD.  -Creatinine normalized, sodium improved.  -Nephrology evaluated         Elevated liver enzymes  -Likely from shock and low cardiac output.   -. ALT has normalized AST continues to trend down         Positive UDS  -Positive for benzos and amphetamines on admission  -Continue to monitor for withdrawal  -Currently she is nonparticipatory in her care so access referral likely not helpful at this point     Peripheral vascular disease  -Doppler pulses on the right.  Vascular was consulted early on in admission  -Vascular felt she likely had peripheral vascular disease and recommended continue supportive care but did not feel surgery was needed and planned to monitor closely.  -Feet remain warm, Doppler pulses on right.     Urinary retention  -Man catheter removed 10/25.  New Man was placed 11/30/2024    Leukocytosis  UTI?  -WBC trending up, up to 17.03 11/1/2024  -Urinalysis ordered, consistent with UTI.  Initiate IV Ceftriaxone, monitor for allergic reaction (allergy listed to cefaclor, hives)      Lovenox 40 mg SC daily for DVT prophylaxis.  Full code.  Guarded prognosis, palliative care appropriate.   Palliative care had evaluated, remains full code.  Discussed with RN  Disposition: Plan home with home with family/home health.  Unfortunately patient with very poor prognosis, due to large MCA with right-sided weakness, nonverbal, dysphagia and will require PEG tube placement.  Recommend LTC.  Will discuss with family.  Expected Discharge Date: 11/4/2024; Expected Discharge Time:        Copied text in this note has been reviewed and is accurate as of 11/01/24.         Dictated utilizing Dragon dictation        Hailey Ricketts MD  [facility]  11/01/24  12:53 EDT

## 2024-11-01 NOTE — PLAN OF CARE
Problem: Adult Inpatient Plan of Care  Goal: Plan of Care Review  Outcome: Progressing  Goal: Patient-Specific Goal (Individualized)  Outcome: Progressing  Goal: Absence of Hospital-Acquired Illness or Injury  Outcome: Progressing  Intervention: Identify and Manage Fall Risk  Recent Flowsheet Documentation  Taken 11/1/2024 0400 by Noe Juárez RN  Safety Promotion/Fall Prevention:   safety round/check completed   room organization consistent   nonskid shoes/slippers when out of bed   fall prevention program maintained   lighting adjusted   clutter free environment maintained  Taken 11/1/2024 0217 by Noe Juárez RN  Safety Promotion/Fall Prevention:   safety round/check completed   room organization consistent   nonskid shoes/slippers when out of bed   lighting adjusted   fall prevention program maintained   clutter free environment maintained  Taken 11/1/2024 0000 by Noe Juárez RN  Safety Promotion/Fall Prevention:   safety round/check completed   room organization consistent   nonskid shoes/slippers when out of bed   lighting adjusted   fall prevention program maintained   clutter free environment maintained  Taken 10/31/2024 2234 by Noe Juárez RN  Safety Promotion/Fall Prevention:   safety round/check completed   room organization consistent   nonskid shoes/slippers when out of bed   lighting adjusted   fall prevention program maintained   clutter free environment maintained  Taken 10/31/2024 2013 by Noe Juárez RN  Safety Promotion/Fall Prevention:   safety round/check completed   room organization consistent   nonskid shoes/slippers when out of bed   lighting adjusted   fall prevention program maintained   clutter free environment maintained  Intervention: Prevent Skin Injury  Recent Flowsheet Documentation  Taken 11/1/2024 0400 by Noe Juárez RN  Body Position:   turned   right  Taken 11/1/2024 0217 by Noe Juárez RN  Body Position: supine  Taken 11/1/2024  0000 by Noe Juárez RN  Body Position:   turned   left  Taken 10/31/2024 2234 by Noe Juárez RN  Body Position: supine  Taken 10/31/2024 2013 by Noe Juárez RN  Body Position:   turned   right  Skin Protection: incontinence pads utilized  Intervention: Prevent and Manage VTE (Venous Thromboembolism) Risk  Recent Flowsheet Documentation  Taken 10/31/2024 2013 by Noe Juárez RN  VTE Prevention/Management:   bilateral   SCDs (sequential compression devices) on  Intervention: Prevent Infection  Recent Flowsheet Documentation  Taken 11/1/2024 0400 by Noe Juárez RN  Infection Prevention:   single patient room provided   rest/sleep promoted   environmental surveillance performed  Taken 11/1/2024 0217 by Noe Juárez RN  Infection Prevention:   rest/sleep promoted   single patient room provided   environmental surveillance performed  Taken 11/1/2024 0000 by Noe Juárez RN  Infection Prevention:   single patient room provided   rest/sleep promoted   environmental surveillance performed  Taken 10/31/2024 2234 by Noe Juárez RN  Infection Prevention:   rest/sleep promoted   single patient room provided   environmental surveillance performed  Taken 10/31/2024 2013 by Noe Juárez RN  Infection Prevention:   single patient room provided   rest/sleep promoted   environmental surveillance performed  Goal: Optimal Comfort and Wellbeing  Outcome: Progressing  Goal: Readiness for Transition of Care  Outcome: Progressing     Problem: Restraint, Nonviolent  Goal: Absence of Harm or Injury  Outcome: Progressing  Intervention: Implement Least Restrictive Safety Strategies  Recent Flowsheet Documentation  Taken 11/1/2024 0600 by Noe Juárez RN  Medical Device Protection:   tubing secured   torso covered  Diversional Activities: television  Taken 11/1/2024 0400 by Noe Juárez RN  Medical Device Protection:   tubing secured   torso covered   IV pole/bag removed from  visual field  Diversional Activities: television  Taken 11/1/2024 0217 by Noe Juárez RN  Medical Device Protection:   torso covered   IV pole/bag removed from visual field   tubing secured  Taken 11/1/2024 0200 by Noe Juárez RN  Medical Device Protection:   IV pole/bag removed from visual field   torso covered   tubing secured  Diversional Activities: television  Taken 11/1/2024 0000 by Noe Juárez RN  Medical Device Protection:   IV pole/bag removed from visual field   torso covered   tubing secured  Diversional Activities: television  Taken 10/31/2024 2234 by Noe Juárez RN  Medical Device Protection:   IV pole/bag removed from visual field   torso covered   tubing secured  Taken 10/31/2024 2200 by Noe Juárez RN  Medical Device Protection:   IV pole/bag removed from visual field   torso covered   tubing secured  Diversional Activities: television  Taken 10/31/2024 2013 by Noe Juárez RN  Medical Device Protection:   IV pole/bag removed from visual field   torso covered  Diversional Activities: television  Taken 10/31/2024 2000 by Noe Juárez RN  Medical Device Protection:   IV pole/bag removed from visual field   torso covered   tubing secured  Diversional Activities: television  Intervention: Protect Skin and Joint Integrity  Recent Flowsheet Documentation  Taken 11/1/2024 0400 by Noe Juárez RN  Body Position:   turned   right  Taken 11/1/2024 0217 by Noe Juárez RN  Body Position: supine  Taken 11/1/2024 0000 by Noe Juárez RN  Body Position:   turned   left  Taken 10/31/2024 2234 by Noe Juárez RN  Body Position: supine  Taken 10/31/2024 2013 by Noe Juárez RN  Body Position:   turned   right  Skin Protection: incontinence pads utilized  Range of Motion: active ROM (range of motion) encouraged   Goal Outcome Evaluation:

## 2024-11-01 NOTE — CONSULTS
Cc: Stroke with dysphagia    History of presenting illness:   This is an unfortunate 59-year-old patient with history of ST elevation MI, status post thrombectomy and stent placement on aspirin and Plavix.  Also with recent stroke.  No significant neurologic improvement but family wishes for aggressive care and as such PEG tube was requested.    Past Medical History: Coronary artery disease    Past Surgical History:  significant for recent cardiac catheterization.  She has had prior coronary artery bypass surgery.  Embolectomy also done during this admission for stroke.  Patient's family denies any known history of abdominal surgery.    Medications: Reviewed and reconciled in epic.  Noted to include aspirin and Plavix.  Also on Lovenox.    Allergies: Bactrim, cefaclor, Flexeril, Robaxin    Social History: Unable to be obtained    Family History: Negative for known colon cancer    Review of Systems:  Unable to be obtained    Physical Exam:  BMI: 25.0  Temperature 98.6 heart rate 88 blood pressure 97/65  General: Awake and alert, looks around, does not respond to questions, no acute distress  Eyes: No scleral icterus, extraocular movements are intact  Neck: Supple without lymphadenopathy or thyromegaly, trachea is in the midline  Respiratory: There is good bilateral chest expansion, no use of accessory muscles is noted  Cardiovascular: No jugular venous distention or peripheral edema is seen  Gastrointestinal: Soft and benign, nondistended, no scars noted    Laboratory data: White blood cell count 17.0, hemoglobin 12.0, platelets 464    Imaging data: No recent relevant data      Assessment and plan:   -Dysphagia secondary to stroke  -Discussed with patient's .  He wishes to proceed with EGD and PEG placement.  -Discussed increased risk for bleeding due to use of aspirin and Plavix, probably nonmodifiable recent issues and fresh stent.  -Other risks include, but not limited to, dislodgment of tube, injury to  surrounding structures such as the liver or colon.  Potential need for further revisional procedures discussed.  Patient and family are agreeable to proceed as outlined.  Tentatively scheduled for tomorrow.      Jus Al MD, [facility]  General, Minimally Invasive and Endoscopic Surgery  [facility]    [address] [place] [address], [address]  [place], [place], [address]  P: [phone]  F: [phone]

## 2024-11-01 NOTE — SIGNIFICANT NOTE
11/01/24 1233   OTHER   Discipline physical therapy assistant   Rehab Time/Intention   Session Not Performed other (see comments)  (Checked with RN. Pt not appropriate for PT.  Pt's NIH still very high and not following any commands.  PT will sign off. Not skilled appropriate at this time. Pt may need to go palliative.)

## 2024-11-01 NOTE — NURSING NOTE
Surgical consent verified c/ another nurse over the phone c/ spouse. Spouse stated that he didn't have any questions and that he already spoke with the surgeon.

## 2024-11-01 NOTE — SIGNIFICANT NOTE
11/01/24 1539   OTHER   Discipline occupational therapist   Rehab Time/Intention   Session Not Performed other (see comments)  (Patient not following any meaningful commands at this time.  Not appropriate for skilled OT at this time.  OT to sign off.)

## 2024-11-01 NOTE — PROGRESS NOTES
Name: Albina Sosa ADMIT: 10/22/2024   : 1965  PCP: Germaine James APRN    MRN: [id number] LOS: 9 days   AGE/SEX: 59 y.o. female  ROOM: [facility]     Subjective   Subjective   Patient is seen at bedside, no new complaints.       Objective   Objective   Vital Signs  Temp:  [97.8 °F (36.6 °C)-99 °F (37.2 °C)] 97.9 °F (36.6 °C)  Heart Rate:  [77-95] 77  Resp:  [16-20] 20  BP: (102-146)/(73-98) 106/73  SpO2:  [91 %-97 %] 97 %  on  Flow (L/min) (Oxygen Therapy):  [3] 3;   Device (Oxygen Therapy): room air  Body mass index is 25.08 kg/m².  Physical Exam  General. confused  Head and ENT, normocephalic and atraumatic.  Lungs, symmetric expansion, equal air entry bilaterally.  Heart, regular rate and rhythm.  Abdomen, soft and nontender.  Extremities, no clubbing or cyanosis.  Neuro, right hemiparesis  Skin: Warm and no rash.  Psych, normal mood and affect.  Musculoskeletal, joint examination is grossly normal.      Results Review     I reviewed the patient's new clinical results.  Results from last 7 days   Lab Units 10/31/24  0623 10/30/24  0526 10/28/24  2353 10/28/24  0402   WBC 10*3/mm3 13.13* 13.60* 14.60* 14.30*   HEMOGLOBIN g/dL 11.7* 12.7 11.9* 11.4*   PLATELETS 10*3/mm3 471* 442 465* 395     Results from last 7 days   Lab Units 10/31/24  0623 10/30/24  0526 10/29/24  0328 10/28/24  1445 10/28/24  0402   SODIUM mmol/L 138 141 145  --  152*   POTASSIUM mmol/L 3.3* 3.5 4.1  4.1 3.9 3.9   CHLORIDE mmol/L 100 98 108*  --  113*   CO2 mmol/L 27.3 30.7* 28.6  --  28.1   BUN mg/dL 35* 46* 43*  --  50*   CREATININE mg/dL 1.12* 1.26* 1.50*  --  1.36*   GLUCOSE mg/dL 145* 165* 144*  --  131*   EGFR mL/min/1.73 57.1* 49.6* 40.2*  --  45.2*     Results from last 7 days   Lab Units 10/31/24  0623 10/30/24  0526 10/29/24  0328 10/28/24  0402 10/27/24  0742 10/26/24  2015   ALBUMIN g/dL 2.4* 2.5* 2.6* 2.8*   < > 3.0*   BILIRUBIN mg/dL  --  0.5  --  0.3  --  0.3   ALK PHOS U/L  --  185*  --  159*  --  159*   AST  (SGOT) U/L  --  41*  --  62*  --  51*   ALT (SGPT) U/L  --  33  --  34*  --  38*    < > = values in this interval not displayed.     Results from last 7 days   Lab Units 10/31/24  0623 10/30/24  0526 10/29/24  0328 10/28/24  0402   CALCIUM mg/dL 8.5* 8.3* 8.4* 8.5*   ALBUMIN g/dL 2.4* 2.5* 2.6* 2.8*   MAGNESIUM mg/dL 2.4 2.6  --   --    PHOSPHORUS mg/dL 3.1 3.0 3.0 3.6       Glucose   Date/Time Value Ref Range Status   10/31/2024 2014 251 (H) 70 - 130 mg/dL Final   10/31/2024 1703 230 (H) 70 - 130 mg/dL Final   10/31/2024 1203 227 (H) 70 - 130 mg/dL Final   10/31/2024 0443 154 (H) 70 - 130 mg/dL Final   10/31/2024 0041 157 (H) 70 - 130 mg/dL Final   10/30/2024 1946 216 (H) 70 - 130 mg/dL Final   10/30/2024 1610 185 (H) 70 - 130 mg/dL Final       No radiology results for the last day    I have personally reviewed all medications:  Scheduled Medications  aspirin, 81 mg, Nasogastric, Daily  atorvastatin, 40 mg, Oral, Nightly  castor oil-balsam peru, 1 Application, Topical, Q12H  chlorhexidine, 15 mL, Mouth/Throat, Q12H  clopidogrel, 75 mg, Nasogastric, Daily  enoxaparin, 30 mg, Subcutaneous, Nightly  famotidine, 20 mg, Nasogastric, Daily  insulin glargine, 30 Units, Subcutaneous, Q12H  insulin regular, 4-24 Units, Subcutaneous, Q4H  metoprolol tartrate, 12.5 mg, Oral, Q12H  potassium chloride, 40 mEq, Nasogastric, BID  torsemide, 40 mg, Nasogastric, Daily  venlafaxine, 37.5 mg, Nasogastric, BID    Infusions  milrinone, 0.125 mcg/kg/min, Last Rate: 0.125 mcg/kg/min (10/31/24 1315)    Diet  NPO Diet NPO Type: Tube Feeding    I have personally reviewed:  [x]  Laboratory   [x]  Microbiology   [x]  Radiology   [x]  EKG/Telemetry  [x]  Cardiology/Vascular   []  Pathology    []  Records       Assessment/Plan     Active Hospital Problems    Diagnosis  POA    **STEMI involving left circumflex coronary artery [I21.21]  Yes    Hyperlipemia [E78.5]  Unknown    Acute hypoxic respiratory failure [J96.01]  Unknown    COPD (chronic  obstructive pulmonary disease) [J44.9]  Unknown    Type 2 diabetes mellitus with hyperglycemia [E11.65]  Unknown    Ischemic cardiomyopathy [I25.5]  Unknown    Elevated liver enzymes [R74.8]  Unknown    Polysubstance abuse [F19.10]  Unknown    Peripheral vascular disease [I73.9]  Unknown    Acute ischemic left middle cerebral artery (MCA) stroke [I63.512]  No      Resolved Hospital Problems   No resolved problems to display.       59 y.o. female admitted with STEMI involving left circumflex coronary artery.    59 y.o. female  admitted to [facility] via EMS 10/22/24 for STEMI. She has history of coronary artery disease with CAB with LIMA to the LAD, with hx of PCI to circumflex and left PDA, hypertension, mixed hyperlipidemia, type 2 diabetes on insulin therapy, prior CVA, history of methamphetamine use, and ongoing tobacco use with underlying lung disease.  She underwent emergent coronary catheterization and was found to have multivessel disease with severe cardiomyopathy and had a PCI with stent to culprit distal marginal branch with residual diffuse LAD and PDA disease with collaterals.  EF 14% by echo without obvious thrombus.  Post PCI she was noted to have increased agitation and right sided weakness with aphasia and stat neurology consult was placed and team D was activated.  CT of the head showed no acute hemorrhage or hypodensity, CTA of the head and neck was not performed at that time due to agitation.  Patient was given TNK and sent for angiogram out of concern for left MCA disease and had mechanical cranial artery embolectomy.          Acute left MCA ischemic stroke  - status post TNK and embolectomy  - Neurology following.  Repeat head imaging negative for hemorrhagic conversion per nephrology  -Remains on dual antiplatelet therapy and statin therapy  -Neurology is recommending BP systolic 140 or less  with strict blood pressure control to prevent hemorrhagic transformation  -Neurology has okayed use of Lovenox  for DVT prophylaxis  -Continue therapies PT and OT  -LDL goal less than 70     Acute STEMI status post primary PCI/stent / ischemic cardiomyopathy  -Cardiac cath with multivessel disease with PCI/stent to mid marginal branch  -EF 14% by echo without clear evidence of left ventricular thrombus  -Cardiology following  -Currently on aspirin and Plavix  -History of prior CAB.   - cath with residual with diffuse LAD and PDA disease with collaterals.   -Was noted to have severely elevated left ventricular end-diastolic pressures in the Cath Lab and she was given IV diuretics with improvement in her chest x-ray imaging.  -Will need to monitor volume status closely.     Hyperlipidemia poorly controlled / hypertriglyceridemia  -Currently on atorvastatin  -Noted total cholesterol back in 2023 was 403 with an LDL incalculable with triglycerides over a thousand.  -Admission lipid panel does show total cholesterol of 245, HDL 37 , triglycerides 311.  LDL HDL ratio is 3.94  - goal LDL less than 70  -Certainly her poorly controlled diabetes is contributing to her elevated triglycerides and subsequent elevated LDL.  Glycemic control is needed.  Unsure regarding adherence to medical therapies as outpt.    - 10/26 TSH check normal.      Acute hypoxic respiratory failure/COPD  -Initially requiring ventilator support . Liberated from ventilator 10/25 now on 3 L nasal cannula  -Pulmonary is following  -Continue nebulizer with good pulmonary hygiene.      Type 2 diabetes, uncontrolled hyperglycemia   -A1c on admit 14.7  -Sugars over 600 on admission  -unclear on her adherence and DM regimen as an outpatient  -Currently on 30 units Lantus every 12.  Last dose adjustment 10/26 PM dosing and has only had 2 doses thus far of 30 units.    - FSBS trends improved  with mostly under 200.  Will give full 24 hours at current dosing before further adjustment.  Trend and reassess 10/27.   -She is on every 6 hour Accu-Cheks while on tube  feeding with sliding scale insulin coverage.     History of tobacco use disorder   -Recommend full cessation of tobacco     Hyponatremia  -Initially hyponatremic on admission.  This has resolved now drifting a bit hypernatremic  -is on TF at 55 cc/hr.  She is getting 20 ml flush q 2 hours.  Will adjust free water and  increase flush free water to 30 cc q 2.   Will need to be cautious with fluid adjustment given her severe CM with EF 10%.   - creatinine is trending up 1.6 (1.39).   -Trend sodium     Elevated liver enzymes  -Likely from shock and low cardiac output.  Overall trend improving.  ALT has normalized AST continues to trend down     NICHO on CKD  -Creatinine improving.  Creatinine 1.38 today down from 1.64.  Did get some contrast load initially on admission likely contributed to rise in creatinine on 10/25  -He has component of chronic kidney disease from poorly controlled diabetes  -Avoid nephrotoxic agents     Fever  -Developed fever on 10/20.  This has resolved.  Low-grade temp with 24 hr Tmax 99.9   -She was started on 1023 Zosyn with plans by pulmonary to transition to Augmentin per tube   - 10/24/2024 respiratory culture showed scant amount of haemophilus influenza  -10/23 blood cultures no growth to date .  Noted 10/23 positive procalcitonin and leukocytosis worsening 10/22 which did improve on antibiotic therapy.      Positive UDS  -Positive for benzos and amphetamines on admission  -Continue to monitor for withdrawal  -Currently she is nonparticipatory in her care so access referral likely not helpful at this point     Peripheral vascular disease  -Doppler pulses on the right.  Vascular was consulted early on in admission  -Vascular felt she likely had peripheral vascular disease and recommended continue supportive care but did not feel surgery was needed and planned to monitor closely.  -Feet remain warm, Doppler pulses on right.     Urinary retention  -Man catheter removed 10/25.  Patient is  requiring In-N-Out cath for high residuals 10/26 but was able to void large amount 10/27 in brief.   -Continue to monitor bladder scans, low threshold to replace Man if high residuals become issue and I/O cath needed routinely to address.      Tachycardia  - noted HR up to 120's.  ST.  ? Related to pain.  RN just medicated with Tylenol.  Recommended bladder scan monitoring.  Tachycardia likely multifactorial.  Does appear she is getting somewhat on the dry side given her rise in sodium.  Will add additional free water which may help with her tachycardia.  Will need to be cautious with hydration efforts given her cardiomyopathy with an EF of 14%.  - WBC trend slightly up 12 (11) but improved from 17.9 on 10/23.  Low grade temp 99.9  - Chest x-ray  10/26 bilateral alveolar and interstitial infiltrates seen on prior imaging the 10/25  -She is on low-dose metoprolol and does have some room for up titration given blood pressures are trending in the 130s.  Will defer to cardiology who is following.     DVT prophylaxis  Lovenox 30 mg SC daily     Discharge  She remains full code and full support.  Her prognosis is guarded.  No family currently at bedside  Expected discharge date/ time has not been documented. TBD, Will need SNF.      Discussed with patient and nursing staff.     Palliative care consult for discussion of goals of care.            Rajat Mathews MD  [facility]  10/31/24  20:21 EDT

## 2024-11-01 NOTE — PROGRESS NOTES
[facility] Progress Note      Patient Name: Albina Sosa  : 1965  MRN: [id number]  Primary Care Physician:  Germaine James APRN  Date of admission: 10/22/2024    Subjective     Interval History:   Follow-up acute kidney injury and hypernatremia.   Continues to be nonverbal, does not follow commands.  No events noted overnight.  Review of Systems:   As noted above    Objective     Vitals:   Temp:  [97.8 °F (36.6 °C)-98.2 °F (36.8 °C)] 97.9 °F (36.6 °C)  Heart Rate:  [77-95] 95  Resp:  [18-20] 18  BP: ()/(62-86) 110/71  Flow (L/min) (Oxygen Therapy):  [3] 3    Intake/Output Summary (Last 24 hours) at 2024 1148  Last data filed at 2024 0800  Gross per 24 hour   Intake 2006 ml   Output 1300 ml   Net 706 ml       Physical Exam:    General Appearance: Restless.  Not tachypneic.  Does make eye contact but does not follow any commands.  Not verbal  Skin: warm and dry  HEENT: oral mucosa dry.  Nasal oxygen.  Core track in place, nonicteric sclera  Neck: supple, no JVD  Lungs: Coarse upper airway rhonchi.  Heart: RRR, normal S1 and S2  Abdomen: soft, nontender, nondistended. +bowel sounds.  Body wall edema.  : no palpable bladder  Extremities: Trace upper and lower extremity edema  Neuro: Not verbal.    Scheduled Meds:     aspirin, 81 mg, Nasogastric, Daily  atorvastatin, 40 mg, Oral, Nightly  castor oil-balsam peru, 1 Application, Topical, Q12H  chlorhexidine, 15 mL, Mouth/Throat, Q12H  clopidogrel, 75 mg, Nasogastric, Daily  enoxaparin, 30 mg, Subcutaneous, Nightly  famotidine, 20 mg, Nasogastric, Daily  insulin glargine, 30 Units, Subcutaneous, Q12H  insulin regular, 4-24 Units, Subcutaneous, Q4H  metoprolol tartrate, 12.5 mg, Oral, Q12H  torsemide, 40 mg, Nasogastric, Daily  venlafaxine, 37.5 mg, Nasogastric, BID      IV Meds:   milrinone, 0.125 mcg/kg/min, Last Rate: 0.125 mcg/kg/min (10/31/24 4945)        Results Reviewed:   I have personally reviewed the  results from the time of this admission to 11/1/2024 11:48 EDT     Results from last 7 days   Lab Units 11/01/24  0554 10/31/24  0623 10/30/24  0526 10/28/24  1445 10/28/24  0402   SODIUM mmol/L 135* 138 141   < > 152*   POTASSIUM mmol/L 4.3 3.3* 3.5   < > 3.9   CHLORIDE mmol/L 100 100 98   < > 113*   CO2 mmol/L 23.3 27.3 30.7*   < > 28.1   BUN mg/dL 33* 35* 46*   < > 50*   CREATININE mg/dL 0.99 1.12* 1.26*   < > 1.36*   CALCIUM mg/dL 8.6 8.5* 8.3*   < > 8.5*   BILIRUBIN mg/dL 0.3  --  0.5  --  0.3   ALK PHOS U/L 165*  --  185*  --  159*   ALT (SGPT) U/L 32  --  33  --  34*   AST (SGOT) U/L 46*  --  41*  --  62*   GLUCOSE mg/dL 160* 145* 165*   < > 131*    < > = values in this interval not displayed.       Estimated Creatinine Clearance: 51.6 mL/min (by C-G formula based on SCr of 0.99 mg/dL).    Results from last 7 days   Lab Units 11/01/24  0554 10/31/24  0623 10/30/24  0526   MAGNESIUM mg/dL 2.6 2.4 2.6   PHOSPHORUS mg/dL 2.9 3.1 3.0       Results from last 7 days   Lab Units 10/28/24  0402   URIC ACID mg/dL 7.7*       Results from last 7 days   Lab Units 11/01/24  0554 10/31/24  0623 10/30/24  0526 10/28/24  2353 10/28/24  0402   WBC 10*3/mm3 17.03* 13.13* 13.60* 14.60* 14.30*   HEMOGLOBIN g/dL 12.0 11.7* 12.7 11.9* 11.4*   PLATELETS 10*3/mm3 464* 471* 442 465* 395               Assessment / Plan     ASSESSMENT:  Acute kidney injury and hypernatremia.  Waste products improved today.  Likely cardiorenal.  Sodium dropping today  ST elevation MI.  Severe heart failure reduced ejection fraction with calculated EF 14%.  Underwent heart cath 10/22/2024.  Status post mid marginal branch PCI and stent.  Severe chronic disease through LAD,  circumflex.   Post cardiac cath left MCA CVA.  Interval left cerebellar stroke.  Status post TNK and thrombectomy.  Significant residual deficit.  4.  Diabetes mellitus type 2  5.  Sacral pressure injury deep tissue coccyx wound.  PLAN:  Will continue current diuretic dose  Decrease  free water flushes per    labs in a.m.  Thank you for involving us in the care of Albina Sosa.  Please feel free to call with any questions.    Alley White MD  11/01/24  11:48 EDT    [facility]  [phone]    Please note that portions of this note were completed with a voice recognition program.

## 2024-11-01 NOTE — NURSING NOTE
11/01/24 0916   Wound 10/24/24 1600 Bilateral sacral spine   Placement Date/Time: 10/24/24 1600   Side: Bilateral  Location: sacral spine  Primary Wound Type: Pressure Injury   Pressure Injury Stage DTPI   Base maroon/purple   Periwound intact   Edges irregular   Wound Length (cm) 5 cm   Wound Width (cm) 7 cm   Wound Surface Area (cm^2) 35 cm^2   Drainage Amount none   Dressing Care dressing changed;hydrofiber;silver impregnated     WOCN: Coccyx pressure injury is not improving. DTI evolving to stage 2 pressure injury.  Needs assistance of 2 to reposition. Will change treatment plan to hydrofiber silver.  Heels bilateral blanchable. Left wound coccyx [id number]. Right coccyx [id number].  Will continue to follow.

## 2024-11-02 NOTE — ANESTHESIA POSTPROCEDURE EVALUATION
Patient: Albina Sosa    Procedure Summary       Date: 11/02/24 Room / Location: [facility] ENDOSCOPY 7 /  [facility]    Anesthesia Start: 0800 Anesthesia Stop: 0835    Procedure: ESOPHAGOGASTRODUODENOSCOPY WITH PERCUTANEOUS ENDOSCOPIC GASTROSTOMY TUBE INSERTION (Esophagus) Diagnosis:       Dysphagia as late effect of cerebrovascular accident (CVA)      (Dysphagia as late effect of cerebrovascular accident (CVA) [I69.391])    Surgeons: Jus Al MD Provider: Og Monahan MD    Anesthesia Type: MAC ASA Status: 4            Anesthesia Type: MAC    Vitals  Vitals Value Taken Time   /73 11/02/24 0850   Temp     Pulse 82 11/02/24 0856   Resp 18 11/02/24 0850   SpO2 98 % 11/02/24 0856   Vitals shown include unfiled device data.        Post Anesthesia Care and Evaluation    Patient location during evaluation: PHASE II  Patient participation: complete - patient participated  Level of consciousness: sleepy but conscious  Pain management: adequate    Airway patency: patent  Anesthetic complications: No anesthetic complications  PONV Status: none  Cardiovascular status: acceptable and hemodynamically stable  Respiratory status: acceptable, nonlabored ventilation and spontaneous ventilation  Hydration status: acceptable

## 2024-11-02 NOTE — OP NOTE
Operative Note :   Jus Al MD    Albina Garciaall  1965    Procedure Date: 11/02/24    Pre-op Diagnosis:  Dysphagia secondary to stroke    Post-op Diagnosis:  Same    Procedure:   Esophagogastroduodenoscopy with placement of percutaneous endoscopic gastrostomy tube    Surgeon: Jus Al MD      Anesthesia: MAC    Indications:  59-year-old patient with history of coronary artery disease and stroke presenting for percutaneous feeding access for longer-term care.    Findings:   Tube secured at 3 cm at the skin    Recommendations:   Tube feeds to begin this afternoon  Okay to use for medications immediately    Description of procedure:    After obtaining informed consent, patient was brought to the endoscopy suite and was sedated.  The flexible gastroscope was introduced through the patient's mouth, passed the cricopharyngeus, down the esophagus, passed the GE junction into the stomach and then passed the pylorus and into the duodenum.  The duodenum had a normal appearance.  The scope was pulled back into the stomach.  The patient's nasojejunal feeding tube was removed.  The stomach was insufflated and the anterior wall was identified.  An area with good transillumination and one-to-one ballottement was identified in the left upper quadrant.  This area was then sterilely prepped and draped and then anesthetized with a lidocaine solution.  An 8 mm dermatotomy was made and then under gastroscopic vision the needle and sheath were introduced into the stomach.  The needle was removed and a wire was passed through the needle.  The wire was grasped with a snare and then pulled up through the patient's mouth.  The 20 Estonian feeding tube was then fed over the wire and pulled up onto the patient's abdominal wall.  The tube was secured at 3 cm.  The gastroscope was reintroduced into the stomach and there was no evidence of bleeding noted.  The tube was cut to the appropriate length and secured.  The patient  tolerated this adequately.    Jus Al MD  General and Endoscopic Surgery  [facility] ([facility]    [address], [address]  [place], [place], [address]  P: [phone]  F: [phone]

## 2024-11-02 NOTE — PROGRESS NOTES
Name: Albina Sosa ADMIT: 10/22/2024   : 1965  PCP: Germaine James APRN    MRN: [id number] LOS: 11 days   AGE/SEX: 59 y.o. female  ROOM: [facility]     Subjective   Subjective   Patient seen this afternoon. S/P peg tube placement this morning.  Laying in bed, drowsy.  Remains nonverbal.Not following commands    Review of Systems   UTO  Objective   Objective   Vital Signs  Temp:  [97.3 °F (36.3 °C)-98.6 °F (37 °C)] 97.7 °F (36.5 °C)  Heart Rate:  [74-92] 75  Resp:  [16-22] 16  BP: ()/(65-96) 113/75  SpO2:  [93 %-100 %] 94 %  on  Flow (L/min) (Oxygen Therapy):  [2-6] 2;   Device (Oxygen Therapy): nasal cannula  Body mass index is 25.78 kg/m².  Physical Exam    General: Lying in bed, drowsy,, nonverbal, ill-appearing,  HEENT: Normocephalic, atraumatic  CV: Regular rate and rhythm, no murmurs rubs or gallops  Lungs: Diminished, no wheezing, nonlabored breathing,  Abdomen: Soft, nondistended, +abdominal binder  Extremities: Bilateral lower extremity edema, no cyanosis     Results Review     I reviewed the patient's new clinical results.  Results from last 7 days   Lab Units 11/02/24  0447 11/01/24  0554 10/31/24  0623 10/30/24  0526   WBC 10*3/mm3 18.44* 17.03* 13.13* 13.60*   HEMOGLOBIN g/dL 12.8 12.0 11.7* 12.7   PLATELETS 10*3/mm3 491* 464* 471* 442     Results from last 7 days   Lab Units 11/02/24  0447 11/01/24  0554 10/31/24  0623 10/30/24  0526   SODIUM mmol/L 136 135* 138 141   POTASSIUM mmol/L 3.5 4.3 3.3* 3.5   CHLORIDE mmol/L 100 100 100 98   CO2 mmol/L 25.0 23.3 27.3 30.7*   BUN mg/dL 36* 33* 35* 46*   CREATININE mg/dL 1.00 0.99 1.12* 1.26*   GLUCOSE mg/dL 107* 160* 145* 165*   Estimated Creatinine Clearance: 51.7 mL/min (by C-G formula based on SCr of 1 mg/dL).  Results from last 7 days   Lab Units 24  0447 24  0554 10/31/24  0623 10/30/24  0526 10/29/24  0328 10/28/24  0402 10/27/24  0742 10/26/24  2015   ALBUMIN g/dL 2.5* 2.4* 2.4* 2.5*   < > 2.8*   < > 3.0*   BILIRUBIN  mg/dL  --  0.3  --  0.5  --  0.3  --  0.3   ALK PHOS U/L  --  165*  --  185*  --  159*  --  159*   AST (SGOT) U/L  --  46*  --  41*  --  62*  --  51*   ALT (SGPT) U/L  --  32  --  33  --  34*  --  38*    < > = values in this interval not displayed.     Results from last 7 days   Lab Units 11/02/24  0447 11/01/24  0554 10/31/24  0623 10/30/24  0526   CALCIUM mg/dL 8.8 8.6 8.5* 8.3*   ALBUMIN g/dL 2.5* 2.4* 2.4* 2.5*   MAGNESIUM mg/dL 2.6 2.6 2.4 2.6   PHOSPHORUS mg/dL 3.4 2.9 3.1 3.0     Results from last 7 days   Lab Units 11/01/24  0554   PROCALCITONIN ng/mL 0.13     SARS-CoV-2, SALVADOR   Date Value Ref Range Status   04/29/2022 NEGATIVE Negative Final     Comment:     The 2019-CoV rRT-PCR Assay is only for use under a Food and Drug Administration Emergency Use Authorization. The performance characteristics of the assay were verified by the Clinical Laboratory at [facility]. Results should be used in   conjunction with the patient's clinical symptoms, medical history, and other clinical/laboratory findings to determine an overall clinical diagnosis. Negative results do not preclude infection with SARS-CoV-2 (COVID-19).    Test parameters have not been validated for screening asymptomatic patients.     Glucose   Date/Time Value Ref Range Status   11/02/2024 1246 103 70 - 130 mg/dL Final   11/02/2024 0939 86 70 - 130 mg/dL Final   11/02/2024 0556 99 70 - 130 mg/dL Final   11/01/2024 2052 224 (H) 70 - 130 mg/dL Final   11/01/2024 1603 322 (H) 70 - 130 mg/dL Final   11/01/2024 1213 216 (H) 70 - 130 mg/dL Final   11/01/2024 0728 191 (H) 70 - 130 mg/dL Final           XR Chest 1 View  Narrative: XR CHEST 1 VW-     INDICATION: Hypoxia     COMPARISON: Chest radiographs dating back to 10/22/2024.     Impression: Central pulmonary vasculature remains dilated and indistinct  with very prominent pulmonary tissue markings bilaterally suggesting  pulmonary edema. Findings have mildly worsened since most  recent  radiograph. No measurable pleural effusion or pneumothorax. Cardiac  silhouette remains at the upper limits of normal with postsurgical  changes of CABG. Enteric tube courses below the diaphragm and outside  the exam field-of-view.     This report was finalized on 11/1/2024 7:44 AM by Dr. Jarret Johnson M.D on Workstation: [id number]       Scheduled Medications  aspirin, 81 mg, Nasogastric, Daily  atorvastatin, 40 mg, Oral, Nightly  castor oil-balsam peru, 1 Application, Topical, Q12H  cefTRIAXone, 1,000 mg, Intravenous, Q24H  chlorhexidine, 15 mL, Mouth/Throat, Q12H  clopidogrel, 75 mg, Nasogastric, Daily  [Held by provider] enoxaparin, 30 mg, Subcutaneous, Nightly  famotidine, 20 mg, Nasogastric, Daily  [Held by provider] insulin glargine, 30 Units, Subcutaneous, Q12H  insulin regular, 4-24 Units, Subcutaneous, Q6H  metoprolol tartrate, 12.5 mg, Oral, Q12H  torsemide, 40 mg, Nasogastric, Daily  venlafaxine, 37.5 mg, Nasogastric, BID    Infusions     Diet  NPO Diet NPO Type: Strict NPO, Tube Feeding    I have personally reviewed     [x]  Laboratory   [x]  Microbiology   [x]  Radiology   [x]  EKG/Telemetry  []  Cardiology/Vascular   []  Pathology    []  Records       Assessment/Plan     Active Hospital Problems    Diagnosis  POA    **STEMI involving left circumflex coronary artery [I21.21]  Yes    Hyperlipemia [E78.5]  Unknown    Acute hypoxic respiratory failure [J96.01]  Unknown    COPD (chronic obstructive pulmonary disease) [J44.9]  Unknown    Type 2 diabetes mellitus with hyperglycemia [E11.65]  Unknown    Ischemic cardiomyopathy [I25.5]  Unknown    Elevated liver enzymes [R74.8]  Unknown    Polysubstance abuse [F19.10]  Unknown    Peripheral vascular disease [I73.9]  Unknown    Acute ischemic left middle cerebral artery (MCA) stroke [I63.512]  No    Dysphagia as late effect of cerebrovascular accident (CVA) [I69.391]  Not Applicable      Resolved Hospital Problems   No resolved problems to  display.       59 y.o. female  admitted to [facility] via EMS 10/22/24 for STEMI. She has history of coronary artery disease with CAB with LIMA to the LAD, with hx of PCI to circumflex and left PDA, hypertension, mixed hyperlipidemia, type 2 diabetes on insulin therapy, prior CVA, history of methamphetamine use, and ongoing tobacco use with underlying lung disease.  She underwent emergent coronary catheterization and was found to have multivessel disease with severe cardiomyopathy and had a PCI with stent to culprit distal marginal branch with residual diffuse LAD and PDA disease with collaterals.  EF 14% by echo without obvious thrombus.  Post PCI she was noted to have increased agitation and right sided weakness with aphasia and stat neurology consult was placed and team D was activated.  CT of the head showed no acute hemorrhage or hypodensity, CTA of the head and neck was not performed at that time due to agitation.  Patient was given TNK and sent for angiogram out of concern for left MCA disease and had mechanical cranial artery embolectomy.          Acute large left MCA ischemic stroke  - status post TNK and embolectomy  -dual antiplatelet therapy and statin therapy  -Neurology is recommending BP systolic 140 or less  with strict blood pressure control to prevent hemorrhagic transformation  -Continue therapies PT and OT  -Remains confused, nonverbal, does not follow commands.+ Right upper and lower extremity weakness    Dysphagia secondary to above  -Status post PEG tube placement 11/2/2024.  To be initiated on tube feeds this afternoon.         Acute STEMI  History of CAD/CABG  Severe ischemic cardiomyopathy  Acute on chronic heart failure with reduced ejection fraction  -Cardiac cath with multivessel disease with PCI/stent to mid marginal branch 10/22/2024  -Echocardiogram 10/28/2024 showed EF of 21-25%, global hypokinesis  -on aspirin and Plavix, statin   -Metoprolol  -On diuresis per nephrology  -Status post  milrinone drip       Hyperlipidemia  -atorvastatin  -goal LDL less than 70      Acute hypoxic respiratory failure/COPD  -Initially requiring ventilator support . Liberated from ventilator 10/25 now on 3 L nasal cannula  -Continue nebulizer with good pulmonary hygiene.   -Diuresis per nephrology       Type 2 diabetes, uncontrolled hyperglycemia   -A1c on admit 14.7  -Sugars over 600 on admission  -unclear on her adherence and DM regimen as an outpatient  -Blood glucose not well-controlled.  Continue glargine 4 units twice daily, continue Humalog SSI.  Add scheduled Humulin 2 units every 6 hours.         History of tobacco use disorder   -Recommend full cessation of tobacco     Hyponatremia  NICHO on CKD.  -Creatinine normalized, sodium improved.  -Nephrology evaluated         Elevated liver enzymes  -Likely from shock and low cardiac output.   -. ALT has normalized AST continues to trend down         Positive UDS  -Positive for benzos and amphetamines on admission  -Continue to monitor for withdrawal  -Currently she is nonparticipatory in her care so access referral likely not helpful at this point     Peripheral vascular disease  -Doppler pulses on the right.  Vascular was consulted early on in admission  -Vascular felt she likely had peripheral vascular disease and recommended continue supportive care but did not feel surgery was needed and planned to monitor closely.  -Feet remain warm, Doppler pulses on right.     Urinary retention  -Man catheter removed 10/25.  New Man was placed 11/30/2024    Leukocytosis  UTI  -WBC trending up, 18.44 this morning  -Urine culture pending  -Continue IV ceftriaxone    Goals of care  Discussed with spouse , ADRIÁN PERALTA  11/2/2024.  Discussed with patient with no significant progress, remains nonverbal, with right-sided weakness, does not follow commands.  Explained overall very poor prognosis in the setting of large MCA stroke with significant residual deficits, and severe  cardiomyopathy.  Per goals of care discussion, he wanted CODE STATUS to be changed to DNR/DNI, updated in the system.  In addition discussed that current plan is home with home health, however I do not think patient is safe be discharged home due to significant comorbidities that would require extensive care.  Spouse reported that he does not want her to go home with home health and wants her to go to LTC instead, but he wants to know locations per patient discharge, discussed with spouse that CCP will discuss with him.    Mr.KATHRYN RONNIE noted that he is the next of kin and he wants to make all decisions, and does not want her daughter to make decisions about her care, however patient's treatment/clinical status can be discussed with daughter.      Lovenox 40 mg SC daily for DVT prophylaxis.  Full code.  Guarded prognosis, palliative care appropriate.  Palliative care had evaluated, remains full code.  Discussed with RN  Disposition: Spouse wants patient to be discharged to LTC, timing to be determined.  Likely medically stable to be discharged in 2-3 days    Expected Discharge Date: 11/4/2024; Expected Discharge Time:        Copied text in this note has been reviewed and is accurate as of 11/02/24.         Dictated utilizing Dragon dictation        Hailey Ricketts MD  [facility]  11/02/24  15:35 EDT

## 2024-11-02 NOTE — PROGRESS NOTES
[facility] Progress Note      Patient Name: Albina Sosa  : 1965  MRN: [id number]  Primary Care Physician:  Germaine James APRN  Date of admission: 10/22/2024    Subjective     Interval History:   Follow-up acute kidney injury and hypernatremia.     No events noted overnight.    Review of Systems:   As noted above    Objective     Vitals:   Temp:  [97.3 °F (36.3 °C)-98.6 °F (37 °C)] 98.4 °F (36.9 °C)  Heart Rate:  [74-92] 79  Resp:  [16-22] 18  BP: ()/(65-96) 104/73  Flow (L/min) (Oxygen Therapy):  [2-6] 2    Intake/Output Summary (Last 24 hours) at 2024 0928  Last data filed at 2024 0830  Gross per 24 hour   Intake 2162 ml   Output 2450 ml   Net -288 ml       Physical Exam:    General Appearance: Restless.  Not tachypneic.  Does make eye contact but does not follow any commands.  Not verbal  Skin: warm and dry  HEENT: oral mucosa dry.  Nasal oxygen.  Core track in place, nonicteric sclera  Neck: supple, no JVD  Lungs: Coarse upper airway rhonchi.  Heart: RRR, normal S1 and S2  Abdomen: soft, nontender, nondistended. +bowel sounds.  Body wall edema.  : no palpable bladder  Extremities: Trace upper and lower extremity edema  Neuro: Not verbal.    Scheduled Meds:     aspirin, 81 mg, Nasogastric, Daily  atorvastatin, 40 mg, Oral, Nightly  castor oil-balsam peru, 1 Application, Topical, Q12H  cefTRIAXone, 1,000 mg, Intravenous, Q24H  chlorhexidine, 15 mL, Mouth/Throat, Q12H  clopidogrel, 75 mg, Nasogastric, Daily  [Held by provider] enoxaparin, 30 mg, Subcutaneous, Nightly  famotidine, 20 mg, Nasogastric, Daily  [Held by provider] insulin glargine, 30 Units, Subcutaneous, Q12H  insulin regular, 4-24 Units, Subcutaneous, Q6H  metoprolol tartrate, 12.5 mg, Oral, Q12H  torsemide, 40 mg, Nasogastric, Daily  venlafaxine, 37.5 mg, Nasogastric, BID      IV Meds:   sodium chloride, 1,000 mL        Results Reviewed:   I have personally reviewed the results from the  time of this admission to 11/2/2024 09:28 EDT     Results from last 7 days   Lab Units 11/02/24 0447 11/01/24  0554 10/31/24  0623 10/30/24  0526 10/28/24  1445 10/28/24  0402   SODIUM mmol/L 136 135* 138 141   < > 152*   POTASSIUM mmol/L 3.5 4.3 3.3* 3.5   < > 3.9   CHLORIDE mmol/L 100 100 100 98   < > 113*   CO2 mmol/L 25.0 23.3 27.3 30.7*   < > 28.1   BUN mg/dL 36* 33* 35* 46*   < > 50*   CREATININE mg/dL 1.00 0.99 1.12* 1.26*   < > 1.36*   CALCIUM mg/dL 8.8 8.6 8.5* 8.3*   < > 8.5*   BILIRUBIN mg/dL  --  0.3  --  0.5  --  0.3   ALK PHOS U/L  --  165*  --  185*  --  159*   ALT (SGPT) U/L  --  32  --  33  --  34*   AST (SGOT) U/L  --  46*  --  41*  --  62*   GLUCOSE mg/dL 107* 160* 145* 165*   < > 131*    < > = values in this interval not displayed.       Estimated Creatinine Clearance: 51.7 mL/min (by C-G formula based on SCr of 1 mg/dL).    Results from last 7 days   Lab Units 11/02/24 0447 11/01/24 0554 10/31/24  0623   MAGNESIUM mg/dL 2.6 2.6 2.4   PHOSPHORUS mg/dL 3.4 2.9 3.1       Results from last 7 days   Lab Units 10/28/24  0402   URIC ACID mg/dL 7.7*       Results from last 7 days   Lab Units 11/02/24 0447 11/01/24  0554 10/31/24  0623 10/30/24  0526 10/28/24  2353   WBC 10*3/mm3 18.44* 17.03* 13.13* 13.60* 14.60*   HEMOGLOBIN g/dL 12.8 12.0 11.7* 12.7 11.9*   PLATELETS 10*3/mm3 491* 464* 471* 442 465*               Assessment / Plan     ASSESSMENT:  Acute kidney injury and hypernatremia-both resolved nicely  ST elevation MI.  Severe heart failure reduced ejection fraction with calculated EF 14%.  Underwent heart cath 10/22/2024.  Status post mid marginal branch PCI and stent.  Severe chronic disease through LAD,  circumflex.   Post cardiac cath left MCA CVA.  Interval left cerebellar stroke.  Status post TNK and thrombectomy.  Significant residual deficit.  4.  Diabetes mellitus type 2  5.  Sacral pressure injury deep tissue coccyx wound.  PLAN:  Would continue current diuretic dose  Decreased free  water flushes per NG   Will sign off    Thank you for involving us in the care of Albina Sosa.  Please feel free to call with any questions.    Jeremiah Gannon MD  11/02/24  09:28 EDT    [facility]  [phone]

## 2024-11-02 NOTE — NURSING NOTE
Pt resting in bed, no s/s of distress or SOB noted. PERRLA, pt is unable to follow commands (baseline) NIH-22, no acute changes overnight reported. Pt NPO for peg tube placement this am. HOB elevated, call light and personal items in reach.

## 2024-11-02 NOTE — PLAN OF CARE
Problem: Adult Inpatient Plan of Care  Goal: Plan of Care Review  Flowsheets (Taken 11/2/2024 0506)  Progress: no change  Outcome Evaluation: tube feedings held at midnight for PEG tube placement today. f/c in place to bsd. vss. o2 weaned to room air while patient awake, 2L NC placed when resting. L wrist restraint in place d/t pulling at cortrak/lines. turn q2hr. NIH=25 at beginning of shift, later assessment when patient more alert/awake NIH=22.  Plan of Care Reviewed With: patient  Goal: Absence of Hospital-Acquired Illness or Injury  Intervention: Identify and Manage Fall Risk  Recent Flowsheet Documentation  Taken 11/2/2024 0204 by Marlene Julien RN  Safety Promotion/Fall Prevention:   safety round/check completed   room organization consistent  Taken 11/2/2024 0011 by Marlene Julien RN  Safety Promotion/Fall Prevention:   safety round/check completed   room organization consistent  Taken 11/1/2024 2005 by Marlene Julien RN  Safety Promotion/Fall Prevention:   safety round/check completed   room organization consistent  Intervention: Prevent Skin Injury  Recent Flowsheet Documentation  Taken 11/2/2024 0204 by Marlene Julien RN  Body Position:   turned   left   side-lying  Taken 11/2/2024 0011 by Marlene Julien RN  Body Position:   turned   right   side-lying  Taken 11/1/2024 2005 by Marlene Julien RN  Body Position:   turned   left   side-lying  Skin Protection:   incontinence pads utilized   pulse oximeter probe site changed   transparent dressing maintained  Intervention: Prevent and Manage VTE (Venous Thromboembolism) Risk  Recent Flowsheet Documentation  Taken 11/1/2024 2005 by Marlene Julien RN  VTE Prevention/Management:   bilateral   SCDs (sequential compression devices) on  Intervention: Prevent Infection  Recent Flowsheet Documentation  Taken 11/2/2024 0204 by Marlene Julien RN  Infection Prevention: rest/sleep promoted  Taken 11/2/2024 0011 by  Marlene Julien RN  Infection Prevention: rest/sleep promoted  Taken 11/1/2024 2005 by Marlene Julien RN  Infection Prevention: rest/sleep promoted  Goal: Optimal Comfort and Wellbeing  Intervention: Monitor Pain and Promote Comfort  Recent Flowsheet Documentation  Taken 11/2/2024 0233 by Marlene Julien RN  Pain Management Interventions:   pain medication given   pillow support provided   position adjusted  Intervention: Provide Person-Centered Care  Recent Flowsheet Documentation  Taken 11/1/2024 2005 by Marlene Julien RN  Trust Relationship/Rapport:   care explained   reassurance provided     Problem: Restraint, Nonviolent  Goal: Absence of Harm or Injury  Intervention: Implement Least Restrictive Safety Strategies  Recent Flowsheet Documentation  Taken 11/2/2024 0400 by Marlene Julien RN  Medical Device Protection: tubing secured  Diversional Activities: music  Taken 11/2/2024 0204 by Marlene Julien RN  Medical Device Protection: tubing secured  Taken 11/2/2024 0200 by Marlene Julien RN  Medical Device Protection: tubing secured  Diversional Activities: music  Taken 11/2/2024 0011 by Marlene Julien RN  Medical Device Protection: tubing secured  Taken 11/2/2024 0000 by Marlene Julien RN  Medical Device Protection: tubing secured  Diversional Activities: television  Taken 11/1/2024 2200 by Marlene Julien RN  Medical Device Protection:   tubing secured   IV pole/bag removed from visual field  Diversional Activities: television  Taken 11/1/2024 2134 by Marlene Julien RN  Medical Device Protection: tubing secured  Diversional Activities: television  Taken 11/1/2024 2005 by Marlene Julien RN  Medical Device Protection: tubing secured  Diversional Activities: television  Taken 11/1/2024 2000 by Marlene Julien RN  Medical Device Protection: IV pole/bag removed from visual field  Diversional Activities: television  Intervention:  Protect Dignity, Rights and Personal Wellbeing  Recent Flowsheet Documentation  Taken 11/1/2024 2005 by Marlene Julien, SUZY  Trust Relationship/Rapport:   care explained   reassurance provided  Intervention: Protect Skin and Joint Integrity  Recent Flowsheet Documentation  Taken 11/2/2024 0204 by Marlene Julien, RN  Body Position:   turned   left   side-lying  Taken 11/2/2024 0011 by Marlene Julien, RN  Body Position:   turned   right   side-lying  Taken 11/1/2024 2005 by Marlene Julien RN  Body Position:   turned   left   side-lying  Skin Protection:   incontinence pads utilized   pulse oximeter probe site changed   transparent dressing maintained   Goal Outcome Evaluation:  Plan of Care Reviewed With: patient        Progress: no change  Outcome Evaluation: tube feedings held at midnight for PEG tube placement today. f/c in place to bsd. vss. o2 weaned to room air while patient awake, 2L NC placed when resting. L wrist restraint in place d/t pulling at cortrak/lines. turn q2hr. NIH=25 at beginning of shift, later assessment when patient more alert/awake NIH=22.

## 2024-11-02 NOTE — ANESTHESIA PREPROCEDURE EVALUATION
Anesthesia Evaluation     Patient summary reviewed and Nursing notes reviewed   NPO Solid Status: > 8 hours  NPO Liquid Status: > 2 hours           Airway   Mallampati: II  TM distance: >3 FB  Neck ROM: full  No difficulty expected  Comment: Grade I view with Holder 2/has feeding tube right nares and nasal O2  Dental    (+) edentulous    Pulmonary - normal exam    breath sounds clear to auscultation  (+) COPD,    ROS comment: Acute hypoxic respiratory failure  Cardiovascular - normal exam    ECG reviewed  Rhythm: regular  Rate: normal    (+) past MI  <3 months, cardiac stents Drug eluting stent within the past 12 months , PVD, hyperlipidemia    ROS comment: CAD with hx CABG x1 in past, recent STEMI and stent X1 on 10/22/24/ischemic cardiomyopathy/EF 25% by ECHO 10/22/24    Neuro/Psych  (+) CVA residual symptoms    ROS Comment: Recent left MCA stroke on 10/22/24, s/p mechanical embolectomy, with residual global aphasia, dysphagia and right hemiplegia  GI/Hepatic/Renal/Endo    (+) diabetes mellitus type 2    ROS Comment: Dysphagia after recent CVA    Musculoskeletal     Abdominal  - normal exam   Substance History   (+) drug use      Comment: Hx polysubstance abuse   OB/GYN          Other            Phys Exam Other: Patient nonverbal              Anesthesia Plan    ASA 4     MAC     intravenous induction         CODE STATUS:    Level Of Support Discussed With: Patient  Code Status (Patient has no pulse and is not breathing): CPR (Attempt to Resuscitate)  Medical Interventions (Patient has pulse or is breathing): Full Support

## 2024-11-02 NOTE — PROGRESS NOTES
Hospital Follow Up    LOS:  LOS: 11 days   Patient Name: Albina Sosa  Age/Sex: 59 y.o. female  : 1965  MRN: [id number]    Day of Service: 24   Length of Stay: 11  Encounter Provider: BULMARO Munoz  Place of Service: [facility] CARDIOLOGY  Patient Care Team:  Germaine James APRN as PCP - General (Nurse Practitioner)    Subjective:     Chief Complaint: Coronary artery disease, embolic stroke  Interval History: Resting comfortably no complaints today    Objective:     Objective:  Temp:  [97.3 °F (36.3 °C)-98.4 °F (36.9 °C)] 98.4 °F (36.9 °C)  Heart Rate:  [74-92] 86  Resp:  [16-22] 16  BP: ()/(68-96) 112/77     Intake/Output Summary (Last 24 hours) at 2024 1603  Last data filed at 2024 1540  Gross per 24 hour   Intake 2162 ml   Output 1750 ml   Net 412 ml     Body mass index is 25.78 kg/m².      10/29/24  0438 10/30/24  0600 24  0610   Weight: 60.2 kg (132 lb 11.5 oz) 60.2 kg (132 lb 11.5 oz) 61.9 kg (136 lb 7.4 oz)     Weight change:     Physical Exam:   General Appearance:    Awake alert and oriented in no acute distress.   Color:  Skin:  Neuro:  HEENT:    Lungs:     Pink  Warm and dry  No focal, motor or sensory deficits  Neck supple, pupils equal, round and reactive. No JVD, No Bruit  Clear to auscultation,respirations regular, even and                  unlabored    Heart:    Regular rate and rhythm, S1 and S2, no murmur, no gallop, no rub. No edema, DP/PT pulses are 2+   Chest Wall:    No abnormalities observed   Abdomen:     Normal bowel sounds, no masses, no organomegaly, soft        non-tender, non-distended, no guarding, no ascites noted   Extremities:   Moves all extremities well, no edema, no cyanosis, no redness       Lab Review:   Results from last 7 days   Lab Units 24  0447 24  0554 10/31/24  0623 10/30/24  0526   SODIUM mmol/L 136 135*   < > 141   POTASSIUM mmol/L 3.5 4.3   < > 3.5   CHLORIDE mmol/L 100 100   " < > 98   CO2 mmol/L 25.0 23.3   < > 30.7*   BUN mg/dL 36* 33*   < > 46*   CREATININE mg/dL 1.00 0.99   < > 1.26*   GLUCOSE mg/dL 107* 160*   < > 165*   CALCIUM mg/dL 8.8 8.6   < > 8.3*   AST (SGOT) U/L  --  46*  --  41*   ALT (SGPT) U/L  --  32  --  33    < > = values in this interval not displayed.         Results from last 7 days   Lab Units 11/02/24  0447 11/01/24  0554   WBC 10*3/mm3 18.44* 17.03*   HEMOGLOBIN g/dL 12.8 12.0   HEMATOCRIT % 37.7 35.8   PLATELETS 10*3/mm3 491* 464*         Results from last 7 days   Lab Units 11/02/24  0447 11/01/24  0554   MAGNESIUM mg/dL 2.6 2.6           Invalid input(s): \"LDLCALC\"  Results from last 7 days   Lab Units 11/01/24  0554   PROBNP pg/mL 17,419.0*         I reviewed the patient's new clinical results.  I personally viewed and interpreted the patient's EKG  Current Medications:   Scheduled Meds:aspirin, 81 mg, Nasogastric, Daily  atorvastatin, 40 mg, Oral, Nightly  castor oil-balsam peru, 1 Application, Topical, Q12H  cefTRIAXone, 1,000 mg, Intravenous, Q24H  chlorhexidine, 15 mL, Mouth/Throat, Q12H  clopidogrel, 75 mg, Nasogastric, Daily  [Held by provider] enoxaparin, 30 mg, Subcutaneous, Nightly  famotidine, 20 mg, Nasogastric, Daily  [Held by provider] insulin glargine, 30 Units, Subcutaneous, Q12H  insulin regular, 4-24 Units, Subcutaneous, Q6H  metoprolol tartrate, 12.5 mg, Oral, Q12H  torsemide, 40 mg, Nasogastric, Daily  venlafaxine, 37.5 mg, Nasogastric, BID      Continuous Infusions:     Allergies:  Allergies   Allergen Reactions    Bactrim [Sulfamethoxazole-Trimethoprim] Hives    Cefaclor Hives    Flexeril [Cyclobenzaprine] Hives    Robaxin [Methocarbamol] Unknown - High Severity       Assessment:     1.  ST elevation MI status post PCI to the OM severe disease in the LAD and circumflex.  On aspirin Plavix  2.  Embolic stroke possible clot on catheter versus thrombus.  Status post TNK and thrombectomy.  No anticoagulation due to risk of hemorrhagic " conversion  3.  Dysphagia status post PEG tube  4  acute systolic heart failure-off inotropic agents tolerating small dose of beta-blocker difficulty with guideline directed medical therapy due to hypotension  5.  Hyponatremia  6.  Type 2 diabetes mellitus      Plan:           BULMARO Munoz  11/02/24  16:03 EDT  Electronically signed by BULMARO Munoz, 11/02/24, 4:03 PM EDT.

## 2024-11-02 NOTE — PLAN OF CARE
Problem: Adult Inpatient Plan of Care  Goal: Absence of Hospital-Acquired Illness or Injury  Intervention: Identify and Manage Fall Risk  Recent Flowsheet Documentation  Taken 11/2/2024 1800 by Maria Guadalupe Brown RN  Safety Promotion/Fall Prevention:   safety round/check completed   nonskid shoes/slippers when out of bed   fall prevention program maintained   clutter free environment maintained   assistive device/personal items within reach  Taken 11/2/2024 1600 by Maria Guadalupe rBown RN  Safety Promotion/Fall Prevention:   safety round/check completed   nonskid shoes/slippers when out of bed   lighting adjusted   fall prevention program maintained   clutter free environment maintained   assistive device/personal items within reach  Taken 11/2/2024 1400 by Maria Guadalupe Brown RN  Safety Promotion/Fall Prevention:   safety round/check completed   nonskid shoes/slippers when out of bed   lighting adjusted   fall prevention program maintained   clutter free environment maintained   assistive device/personal items within reach  Taken 11/2/2024 1200 by Maria Guadalupe Brown RN  Safety Promotion/Fall Prevention:   safety round/check completed   nonskid shoes/slippers when out of bed  Taken 11/2/2024 1000 by Maria Guadalupe Brown RN  Safety Promotion/Fall Prevention:   safety round/check completed   nonskid shoes/slippers when out of bed  Taken 11/2/2024 0925 by Maria Guadalupe Brown RN  Safety Promotion/Fall Prevention:   safety round/check completed   nonskid shoes/slippers when out of bed   fall prevention program maintained   clutter free environment maintained   assistive device/personal items within reach  Intervention: Prevent Skin Injury  Recent Flowsheet Documentation  Taken 11/2/2024 0925 by Maria Guadalupe Brown RN  Body Position:   turned   left  Skin Protection:   incontinence pads utilized   skin sealant/moisture barrier applied   transparent dressing maintained   pulse oximeter probe site changed   protective footwear used  Intervention: Prevent  Infection  Recent Flowsheet Documentation  Taken 11/2/2024 1800 by Maria Guadalupe Brown RN  Infection Prevention: single patient room provided  Taken 11/2/2024 1600 by Maria Guadalupe Brown RN  Infection Prevention: single patient room provided  Taken 11/2/2024 1000 by Maria Guadalupe Brown RN  Infection Prevention: single patient room provided  Taken 11/2/2024 0925 by Maria Guadalupe Brown RN  Infection Prevention:   single patient room provided   rest/sleep promoted  Goal: Optimal Comfort and Wellbeing  Intervention: Provide Person-Centered Care  Recent Flowsheet Documentation  Taken 11/2/2024 0925 by Maria Guadalupe Brown RN  Trust Relationship/Rapport: care explained   Goal Outcome Evaluation:   VSS, PERRLA, no acute changes noted. Peg tube noted to left upper quad, scant amount of drainage noted, patent, pt tolerated tube feeding well. Nonverbal s/s of pain noted, PRN pain meds admin as directed. HOB elevated, call light and personal item sin reach.

## 2024-11-03 NOTE — PLAN OF CARE
Problem: Adult Inpatient Plan of Care  Goal: Plan of Care Review  11/3/2024 0629 by Marlene Julien RN  Flowsheets (Taken 11/3/2024 0629)  Progress: no change  Plan of Care Reviewed With: patient  11/3/2024 0628 by Marlene Julien RN  Flowsheets (Taken 11/3/2024 0627)  Outcome Evaluation: VSS. tube feedings infusing through PEG tube without issue overnight, minimal residual volumes measured. f/c in placed to bsd. turn q2h. 2L NC while resting between care. NIH=23.  Plan of Care Reviewed With: patient  Goal: Absence of Hospital-Acquired Illness or Injury  Intervention: Identify and Manage Fall Risk  Recent Flowsheet Documentation  Taken 11/3/2024 0442 by Marlene Julien RN  Safety Promotion/Fall Prevention:   safety round/check completed   room organization consistent  Taken 11/3/2024 0201 by Marlene Julien RN  Safety Promotion/Fall Prevention:   room organization consistent   safety round/check completed  Taken 11/3/2024 0030 by Marlene Julien RN  Safety Promotion/Fall Prevention:   safety round/check completed   room organization consistent  Taken 11/2/2024 2200 by Marlene Julien RN  Safety Promotion/Fall Prevention:   room organization consistent   safety round/check completed  Taken 11/2/2024 2125 by Marlene Julien RN  Safety Promotion/Fall Prevention:   room organization consistent   safety round/check completed  Taken 11/2/2024 2024 by Marlene Julien RN  Safety Promotion/Fall Prevention:   room organization consistent   safety round/check completed  Intervention: Prevent Skin Injury  Recent Flowsheet Documentation  Taken 11/3/2024 0442 by Marlene Julien RN  Body Position:   turned   supine  Taken 11/3/2024 0201 by Mralene Julien RN  Body Position:   turned   right   side-lying  Taken 11/3/2024 0030 by Marlene Julien RN  Body Position:   turned   left   side-lying  Skin Protection: skin sealant/moisture barrier applied  Taken 11/2/2024  2200 by Marlene Julien RN  Body Position:   turned   supine  Taken 11/2/2024 2125 by Marlene Julien RN  Body Position:   turned   left   tilted  Taken 11/2/2024 2024 by Marlene Julien RN  Body Position:   turned   right   side-lying  Skin Protection: incontinence pads utilized  Intervention: Prevent and Manage VTE (Venous Thromboembolism) Risk  Recent Flowsheet Documentation  Taken 11/3/2024 0030 by Marlene Julien RN  VTE Prevention/Management:   bilateral   SCDs (sequential compression devices) on  Taken 11/2/2024 2024 by Marlene Julien RN  VTE Prevention/Management:   bilateral   SCDs (sequential compression devices) on  Intervention: Prevent Infection  Recent Flowsheet Documentation  Taken 11/3/2024 0442 by Marlene Julien RN  Infection Prevention: rest/sleep promoted  Taken 11/3/2024 0201 by Marlene Julien RN  Infection Prevention: rest/sleep promoted  Taken 11/3/2024 0030 by Marlene Julien RN  Infection Prevention: rest/sleep promoted  Taken 11/2/2024 2200 by Marlene Julien RN  Infection Prevention: rest/sleep promoted  Taken 11/2/2024 2125 by Marlene Julien RN  Infection Prevention: rest/sleep promoted  Taken 11/2/2024 2024 by Marlene Julien RN  Infection Prevention: rest/sleep promoted  Goal: Optimal Comfort and Wellbeing  Intervention: Monitor Pain and Promote Comfort  Recent Flowsheet Documentation  Taken 11/3/2024 0030 by Marlene Julien RN  Pain Management Interventions:   pillow support provided   position adjusted  Taken 11/2/2024 2133 by Marlene Julien RN  Pain Management Interventions: relaxation techniques promoted  Taken 11/2/2024 2024 by Marlene Julien RN  Pain Management Interventions:   pillow support provided   position adjusted  Intervention: Provide Person-Centered Care  Recent Flowsheet Documentation  Taken 11/2/2024 2133 by Marlene Julien RN  Trust Relationship/Rapport: care explained      Problem: Restraint, Nonviolent  Goal: Absence of Harm or Injury  Intervention: Implement Least Restrictive Safety Strategies  Recent Flowsheet Documentation  Taken 11/3/2024 0600 by Marlene Julien RN  Medical Device Protection: tubing secured  Diversional Activities: television  Taken 11/3/2024 0442 by Marlene Julien RN  Medical Device Protection: tubing secured  Taken 11/3/2024 0400 by Marlene Julien RN  Medical Device Protection: tubing secured  Diversional Activities: television  Taken 11/3/2024 0300 by Marlene Julien RN  Medical Device Protection: tubing secured  Diversional Activities: television  Taken 11/3/2024 0201 by Marlene Julien RN  Medical Device Protection: tubing secured  Taken 11/3/2024 0200 by Marlene Julien RN  Medical Device Protection: tubing secured  Diversional Activities: music  Taken 11/3/2024 0100 by Marlene Julien RN  Medical Device Protection:   torso covered   tubing secured  Diversional Activities: music  Taken 11/3/2024 0030 by Marlene Julien RN  Medical Device Protection: torso covered  Diversional Activities: television  Taken 11/3/2024 0000 by Marlene Julien RN  Medical Device Protection:   tubing secured   torso covered  Diversional Activities:   television   music  Taken 11/2/2024 2200 by Marlene Julien RN  Medical Device Protection: tubing secured  Diversional Activities: television  Taken 11/2/2024 2133 by Marlene Julien RN  Diversional Activities: television  Taken 11/2/2024 2024 by Marlene Julien RN  Medical Device Protection:   tubing secured   torso covered  Diversional Activities: television  Taken 11/2/2024 2000 by Marlene Julien RN  Medical Device Protection: torso covered  Diversional Activities: television  Taken 11/2/2024 1926 by Marlene Julien RN  Medical Device Protection: IV pole/bag removed from visual field  Diversional Activities: television  Intervention: Protect  Dignity, Rights and Personal Wellbeing  Recent Flowsheet Documentation  Taken 11/2/2024 2133 by Marlene Julien RN  Trust Relationship/Rapport: care explained  Intervention: Protect Skin and Joint Integrity  Recent Flowsheet Documentation  Taken 11/3/2024 0442 by Marlene Julien RN  Body Position:   turned   supine  Taken 11/3/2024 0201 by Marlene Julien RN  Body Position:   turned   right   side-lying  Taken 11/3/2024 0030 by Marlene Julien RN  Body Position:   turned   left   side-lying  Skin Protection: skin sealant/moisture barrier applied  Taken 11/2/2024 2200 by Marlene Julien RN  Body Position:   turned   supine  Taken 11/2/2024 2125 by Marlene Julien RN  Body Position:   turned   left   tilted  Taken 11/2/2024 2024 by Marlene Julien RN  Body Position:   turned   right   side-lying  Skin Protection: incontinence pads utilized   Goal Outcome Evaluation:  Plan of Care Reviewed With: patient        Progress: no change  Outcome Evaluation: VSS. tube feedings infusing through PEG tube without issue overnight, minimal residual volumes measured. f/c in placed to bsd. turn q2h. 2L NC while resting between care. NIH=23.

## 2024-11-03 NOTE — PROGRESS NOTES
Name: Albina Sosa ADMIT: 10/22/2024   : 1965  PCP: Germaine James APRN    MRN: [id number] LOS: 12 days   AGE/SEX: 59 y.o. female  ROOM: [facility]     Subjective   Subjective   Still feels back to baseline.  Dificid initiated, tolerating.  Advised no changes, remain nonverbal, does not follow commands.    Review of Systems   UTO  Objective   Objective   Vital Signs  Temp:  [97.5 °F (36.4 °C)-98.6 °F (37 °C)] 97.5 °F (36.4 °C)  Heart Rate:  [75-89] 86  Resp:  [16-22] 16  BP: ()/(68-91) 125/90  SpO2:  [93 %-100 %] 93 %  on  Flow (L/min) (Oxygen Therapy):  [2-6] 2;   Device (Oxygen Therapy): nasal cannula  Body mass index is 25.78 kg/m².  Physical Exam    General: Alert, laying in bed, nonverbal,  HEENT: Normocephalic, atraumatic  CV: Regular rate and rhythm, no murmurs rubs or gallops  Lungs: Diminished, no wheezing, nonlabored breathing,  Abdomen: Soft, nondistended, +abdominal binder  Extremities: Bilateral lower extremity edema, no cyanosis     Results Review     I reviewed the patient's new clinical results.  Results from last 7 days   Lab Units 11/03/24  0426 11/02/24  0447 11/01/24  0554 10/31/24  0623   WBC 10*3/mm3 13.33* 18.44* 17.03* 13.13*   HEMOGLOBIN g/dL 11.9* 12.8 12.0 11.7*   PLATELETS 10*3/mm3 362 491* 464* 471*     Results from last 7 days   Lab Units 2454 10/31/24  0623   SODIUM mmol/L 138 136 135* 138   POTASSIUM mmol/L 4.1 3.5 4.3 3.3*   CHLORIDE mmol/L 99 100 100 100   CO2 mmol/L 24.0 25.0 23.3 27.3   BUN mg/dL 42* 36* 33* 35*   CREATININE mg/dL 1.22* 1.00 0.99 1.12*   GLUCOSE mg/dL 226* 107* 160* 145*   Estimated Creatinine Clearance: 42.4 mL/min (A) (by C-G formula based on SCr of 1.22 mg/dL (H)).  Results from last 7 days   Lab Units 24  0426 24  0447 24  0554 10/31/24  0623 10/30/24  0526 10/29/24  0328 10/28/24  0402   ALBUMIN g/dL 2.7* 2.7*  2.5* 2.4* 2.4* 2.5*   < > 2.8*   BILIRUBIN mg/dL  --  0.3 0.3  --  0.5   --  0.3   ALK PHOS U/L  --  154* 165*  --  185*  --  159*   AST (SGOT) U/L  --  41* 46*  --  41*  --  62*   ALT (SGPT) U/L  --  37* 32  --  33  --  34*    < > = values in this interval not displayed.     Results from last 7 days   Lab Units 11/03/24  0426 11/02/24  0447 11/01/24  0554 10/31/24  0623   CALCIUM mg/dL 9.4 8.8 8.6 8.5*   ALBUMIN g/dL 2.7* 2.7*  2.5* 2.4* 2.4*   MAGNESIUM mg/dL 2.6 2.6 2.6 2.4   PHOSPHORUS mg/dL 3.9 3.4 2.9 3.1     Results from last 7 days   Lab Units 11/01/24  0554   PROCALCITONIN ng/mL 0.13     SARS-CoV-2, SALVADOR   Date Value Ref Range Status   04/29/2022 NEGATIVE Negative Final     Comment:     The 2019-CoV rRT-PCR Assay is only for use under a Food and Drug Administration Emergency Use Authorization. The performance characteristics of the assay were verified by the Clinical Laboratory at [facility]. Results should be used in   conjunction with the patient's clinical symptoms, medical history, and other clinical/laboratory findings to determine an overall clinical diagnosis. Negative results do not preclude infection with SARS-CoV-2 (COVID-19).    Test parameters have not been validated for screening asymptomatic patients.     Glucose   Date/Time Value Ref Range Status   11/03/2024 0636 227 (H) 70 - 130 mg/dL Final   11/03/2024 0048 258 (H) 70 - 130 mg/dL Final   11/02/2024 2036 254 (H) 70 - 130 mg/dL Final   11/02/2024 1717 189 (H) 70 - 130 mg/dL Final   11/02/2024 1246 103 70 - 130 mg/dL Final   11/02/2024 0939 86 70 - 130 mg/dL Final   11/02/2024 0556 99 70 - 130 mg/dL Final           XR Chest 1 View  Narrative: XR CHEST 1 VW-     INDICATION: Hypoxia     COMPARISON: Chest radiographs dating back to 10/22/2024.     Impression: Central pulmonary vasculature remains dilated and indistinct  with very prominent pulmonary tissue markings bilaterally suggesting  pulmonary edema. Findings have mildly worsened since most recent  radiograph. No measurable pleural effusion or  pneumothorax. Cardiac  silhouette remains at the upper limits of normal with postsurgical  changes of CABG. Enteric tube courses below the diaphragm and outside  the exam field-of-view.     This report was finalized on 11/1/2024 7:44 AM by Dr. Jarret Johnson M.D on Workstation: [id number]       Scheduled Medications  aspirin, 81 mg, Nasogastric, Daily  atorvastatin, 40 mg, Oral, Nightly  castor oil-balsam peru, 1 Application, Topical, Q12H  cefTRIAXone, 1,000 mg, Intravenous, Q24H  chlorhexidine, 15 mL, Mouth/Throat, Q12H  clopidogrel, 75 mg, Nasogastric, Daily  [Held by provider] enoxaparin, 30 mg, Subcutaneous, Nightly  famotidine, 20 mg, Nasogastric, Daily  insulin glargine, 30 Units, Subcutaneous, Q12H  insulin regular, 4-24 Units, Subcutaneous, Q6H  metoprolol tartrate, 12.5 mg, Oral, Q12H  torsemide, 40 mg, Nasogastric, Daily  venlafaxine, 37.5 mg, Nasogastric, BID    Infusions     Diet  NPO Diet NPO Type: Strict NPO, Tube Feeding    I have personally reviewed     [x]  Laboratory   [x]  Microbiology   []  Radiology   []  EKG/Telemetry  []  Cardiology/Vascular   []  Pathology    []  Records       Assessment/Plan     Active Hospital Problems    Diagnosis  POA    **STEMI involving left circumflex coronary artery [I21.21]  Yes    Hyperlipemia [E78.5]  Unknown    Acute hypoxic respiratory failure [J96.01]  Unknown    COPD (chronic obstructive pulmonary disease) [J44.9]  Unknown    Type 2 diabetes mellitus with hyperglycemia [E11.65]  Unknown    Ischemic cardiomyopathy [I25.5]  Unknown    Elevated liver enzymes [R74.8]  Unknown    Polysubstance abuse [F19.10]  Unknown    Peripheral vascular disease [I73.9]  Unknown    Acute ischemic left middle cerebral artery (MCA) stroke [I63.512]  No    Dysphagia as late effect of cerebrovascular accident (CVA) [I69.391]  Not Applicable      Resolved Hospital Problems   No resolved problems to display.       59 y.o. female  admitted to [facility] via EMS 10/22/24 for STEMI. She  has history of coronary artery disease with CAB with LIMA to the LAD, with hx of PCI to circumflex and left PDA, hypertension, mixed hyperlipidemia, type 2 diabetes on insulin therapy, prior CVA, history of methamphetamine use, and ongoing tobacco use with underlying lung disease.  She underwent emergent coronary catheterization and was found to have multivessel disease with severe cardiomyopathy and had a PCI with stent to culprit distal marginal branch with residual diffuse LAD and PDA disease with collaterals.  EF 14% by echo without obvious thrombus.  Post PCI she was noted to have increased agitation and right sided weakness with aphasia and stat neurology consult was placed and team D was activated.  CT of the head showed no acute hemorrhage or hypodensity, CTA of the head and neck was not performed at that time due to agitation.  Patient was given TNK and sent for angiogram out of concern for left MCA disease and had mechanical cranial artery embolectomy.          Acute large left MCA ischemic stroke  -status post TNK and embolectomy  -dual antiplatelet therapy and statin therapy  -Neurology is recommending BP systolic 140 or less  with strict blood pressure control to prevent hemorrhagic transformation  -Continue therapies PT and OT  -Remains confused, nonverbal, does not follow commands.+ Right upper and lower extremity weakness    Dysphagia secondary to above  -Status post PEG tube placement 11/2/2024  -Continue tube feeds         Acute STEMI  History of CAD/CABG  Severe ischemic cardiomyopathy  Acute on chronic heart failure with reduced ejection fraction  -Cardiac cath with multivessel disease with PCI/stent to mid marginal branch 10/22/2024  -Echocardiogram 10/28/2024 showed EF of 21-25%, global hypokinesis  -on aspirin and Plavix, statin   -Metoprolol  -P.o. torsemide  -Status post milrinone drip  - Cardiology following       Hyperlipidemia  -atorvastatin  -goal LDL less than 70      Acute hypoxic  respiratory failure/COPD  -Initially requiring ventilator support . Liberated from ventilator 10/25 now on 3 L nasal cannula  -Pulmonary hygiene  -As needed DuoNebs  -Diuresis per nephrology       Type 2 diabetes, uncontrolled hyperglycemia   -A1c on admit 14.7  -Sugars over 600 on admission  -unclear on her adherence and DM regimen as an outpatient  -Blood glucose trending up, increase Lantus back to 30 units twice daily(was decreased yesterday due to procedure), -continue SSI         History of tobacco use disorder   -Recommend full cessation of tobacco     Hyponatremia  NICHO on CKD.  -Nephrology evaluated  -Creatinine slightly up.  Sodium normal  -Monitor           Elevated liver enzymes  -Likely from shock and low cardiac output.   -Improved         Positive UDS  -Positive for benzos and amphetamines on admission  -Continue to monitor for withdrawal  -Currently she is nonparticipatory in her care so access referral likely not helpful at this point     Peripheral vascular disease  -Continue dual antiplatelet, statin     Urinary retention  -Man catheter removed 10/25.  New Man was placed 11/30/2024    Leukocytosis  UTI  -Prelim urine culture growing E. coli, sensitivity pending  -WBC improving  -Continue IV ceftriaxone      Goals of care  Discussed with spouse , ADRIÁN PERALTA  11/2/2024.  Discussed with patient with no significant progress, remains nonverbal, with right-sided weakness, does not follow commands.  Explained overall very poor prognosis in the setting of large MCA stroke with significant residual deficits, and severe cardiomyopathy.  Per goals of care discussion, he wanted CODE STATUS to be changed to DNR/DNI, updated in the system.  In addition discussed that current plan is home with home health, however I do not think patient is safe be discharged home due to significant comorbidities that would require extensive care.  Spouse reported that he does not want her to go home with home health and  wants her to go to LTC instead, but he wants to know locations per patient discharge, discussed with spouse that CCP will discuss with him.    Mr ADRIÁN PERALTA noted that he is the next of kin and he wants to make all decisions, and does not want her daughter to make decisions about her care, however patient's treatment/clinical status can be discussed with daughter.      Lovenox 40 mg SC daily for DVT prophylaxis.  Full code.  DNR/DNI, discussed with spouse  Disposition: Spouse wants patient to be discharged to LTC, timing to be determined.  Likely medically stable to be discharged in 2-3 days    Expected Discharge Date: 11/4/2024; Expected Discharge Time:        Copied text in this note has been reviewed and is accurate as of 11/03/24.         Dictated utilizing Dragon dictation        Hailey Ricketts MD  [facility]  11/03/24  07:47 EST

## 2024-11-03 NOTE — PLAN OF CARE
Problem: Adult Inpatient Plan of Care  Goal: Absence of Hospital-Acquired Illness or Injury  Intervention: Identify and Manage Fall Risk  Recent Flowsheet Documentation  Taken 11/3/2024 1600 by Maria Guadalupe Brown RN  Safety Promotion/Fall Prevention:   safety round/check completed   nonskid shoes/slippers when out of bed   lighting adjusted   fall prevention program maintained   clutter free environment maintained   assistive device/personal items within reach  Taken 11/3/2024 1400 by Maria Guadalupe Brown RN  Safety Promotion/Fall Prevention:   safety round/check completed   nonskid shoes/slippers when out of bed   mobility aid in reach   fall prevention program maintained   clutter free environment maintained   assistive device/personal items within reach  Taken 11/3/2024 1200 by Maria Guadalupe Brown RN  Safety Promotion/Fall Prevention:   safety round/check completed   nonskid shoes/slippers when out of bed   lighting adjusted   fall prevention program maintained   clutter free environment maintained   assistive device/personal items within reach  Taken 11/3/2024 1000 by Maria Guadalupe Brown RN  Safety Promotion/Fall Prevention:   safety round/check completed   nonskid shoes/slippers when out of bed   mobility aid in reach   fall prevention program maintained   clutter free environment maintained   assistive device/personal items within reach  Taken 11/3/2024 0830 by Maria Guadalupe Brown RN  Safety Promotion/Fall Prevention:   safety round/check completed   nonskid shoes/slippers when out of bed   lighting adjusted   fall prevention program maintained   clutter free environment maintained   assistive device/personal items within reach  Intervention: Prevent Skin Injury  Recent Flowsheet Documentation  Taken 11/3/2024 0830 by Maria Guadalupe Brown RN  Body Position:   weight shifting   sitting up in bed   tilted   right  Skin Protection:   drying agents applied   incontinence pads utilized   protective footwear used   pulse oximeter probe site changed   skin  sealant/moisture barrier applied   transparent dressing maintained  Intervention: Prevent and Manage VTE (Venous Thromboembolism) Risk  Recent Flowsheet Documentation  Taken 11/3/2024 0830 by Maria Guadalupe Brown RN  VTE Prevention/Management:   bilateral   SCDs (sequential compression devices) on  Intervention: Prevent Infection  Recent Flowsheet Documentation  Taken 11/3/2024 1600 by Maria Guadalupe Brown RN  Infection Prevention: single patient room provided  Taken 11/3/2024 1400 by Maria Guadalupe Brown RN  Infection Prevention: single patient room provided  Taken 11/3/2024 1200 by Maria Guadalupe Brown RN  Infection Prevention: single patient room provided  Taken 11/3/2024 1000 by Maria Guadalupe Brown RN  Infection Prevention: single patient room provided  Taken 11/3/2024 0830 by Maria Guadalupe Brown RN  Infection Prevention: single patient room provided  Goal: Optimal Comfort and Wellbeing  Intervention: Provide Person-Centered Care  Recent Flowsheet Documentation  Taken 11/3/2024 0830 by Maria Guadalupe Brown RN  Trust Relationship/Rapport: care explained   Goal Outcome Evaluation: No acute changes noted this shift. HOB remained 30 degrees or above.

## 2024-11-03 NOTE — PLAN OF CARE
Goal Outcome Evaluation:  Plan of Care Reviewed With: patient        Progress: no change  Outcome Evaluation: VSS. tube feedings infusing through PEG tube without issue overnight, minimal residual volumes measured. f/c in placed to bsd. turn q2h. 2L NC while resting between care. NIH=23. L wrist restraint remains in place d/t pulling at tubes/lines- abd binder in place to keep from pulling at peg tube.

## 2024-11-03 NOTE — PROGRESS NOTES
Postop day 1 placement of PEG    Subjective:  Stable overnight.  Tolerating tube feeds at 35 cc an hour currently.    Objective:  Afebrile with stable vitals  General: Alert, does not respond to questions  Abdomen: Soft, benign, G-tube site is clean.    Assessment and plan:  -Status post placement of PEG for dysphagia  -Tolerating to this point.  Advance to goal as tolerated.  -We will sign off, please call as needed    Jus Al MD  General and Endoscopic Surgery  [facility]    [address], [address]  [place], [place], [address]  P: [phone]  F: [phone]

## 2024-11-03 NOTE — PROGRESS NOTES
Hospital Follow Up    LOS:  LOS: 12 days   Patient Name: Albina Sosa  Age/Sex: 59 y.o. female  : 1965  MRN: [id number]    Day of Service: 24   Length of Stay: 12  Encounter Provider: BULMARO Munoz  Place of Service: [facility] CARDIOLOGY  Patient Care Team:  Germaine James APRN as PCP - General (Nurse Practitioner)    Subjective:     Chief Complaint: Coronary artery disease, embolic stroke    Interval History: More awake and alert yesterday non-verbal    Objective:     Objective:  Temp:  [97.5 °F (36.4 °C)-98.6 °F (37 °C)] 98.6 °F (37 °C)  Heart Rate:  [81-86] 83  Resp:  [16-18] 16  BP: (108-125)/(72-90) 110/75     Intake/Output Summary (Last 24 hours) at 11/3/2024 1504  Last data filed at 11/3/2024 0830  Gross per 24 hour   Intake 1481 ml   Output 1875 ml   Net -394 ml     Body mass index is 25.78 kg/m².      10/29/24  0438 10/30/24  0600 24  0610   Weight: 60.2 kg (132 lb 11.5 oz) 60.2 kg (132 lb 11.5 oz) 61.9 kg (136 lb 7.4 oz)     Weight change:     Physical Exam:   General Appearance:    Awake alert and oriented in no acute distress.  Nonverbal   Color:  Skin:  Neuro:  HEENT:    Lungs:     Pink  Warm and dry  Does not follow commands, right upper and lower extremity weakness  Neck supple, pupils equal, round and reactive. No JVD, No Bruit  Clear to auscultation,respirations regular, even and                  unlabored    Heart:    Regular rate and rhythm, S1 and S2, no murmur, no gallop, no rub. No edema, DP/PT pulses are 2+   Chest Wall:    No abnormalities observed   Abdomen:     Normal bowel sounds, no masses, no organomegaly, soft        non-tender, non-distended, no guarding, no ascites noted   Extremities:   Moves all extremities well, no edema, no cyanosis, no redness       Lab Review:   Results from last 7 days   Lab Units 24  0426 24  0447 24  0554   SODIUM mmol/L 138 136 135*   POTASSIUM mmol/L 4.1 3.5 4.3  "  CHLORIDE mmol/L 99 100 100   CO2 mmol/L 24.0 25.0 23.3   BUN mg/dL 42* 36* 33*   CREATININE mg/dL 1.22* 1.00 0.99   GLUCOSE mg/dL 226* 107* 160*   CALCIUM mg/dL 9.4 8.8 8.6   AST (SGOT) U/L  --  41* 46*   ALT (SGPT) U/L  --  37* 32         Results from last 7 days   Lab Units 11/03/24  0426 11/02/24  0447   WBC 10*3/mm3 13.33* 18.44*   HEMOGLOBIN g/dL 11.9* 12.8   HEMATOCRIT % 36.9 37.7   PLATELETS 10*3/mm3 362 491*         Results from last 7 days   Lab Units 11/03/24  0426 11/02/24  0447   MAGNESIUM mg/dL 2.6 2.6           Invalid input(s): \"LDLCALC\"  Results from last 7 days   Lab Units 11/01/24  0554   PROBNP pg/mL 17,419.0*         I reviewed the patient's new clinical results.  I personally viewed and interpreted the patient's EKG  Current Medications:   Scheduled Meds:aspirin, 81 mg, Nasogastric, Daily  atorvastatin, 40 mg, Oral, Nightly  castor oil-balsam peru, 1 Application, Topical, Q12H  cefTRIAXone, 1,000 mg, Intravenous, Q24H  chlorhexidine, 15 mL, Mouth/Throat, Q12H  clopidogrel, 75 mg, Nasogastric, Daily  enoxaparin, 30 mg, Subcutaneous, Nightly  famotidine, 20 mg, Nasogastric, Daily  insulin glargine, 30 Units, Subcutaneous, Q12H  insulin regular, 4-24 Units, Subcutaneous, Q6H  metoprolol tartrate, 12.5 mg, Oral, Q12H  torsemide, 40 mg, Nasogastric, Daily  venlafaxine, 37.5 mg, Nasogastric, BID      Continuous Infusions:     Allergies:  Allergies   Allergen Reactions    Bactrim [Sulfamethoxazole-Trimethoprim] Hives    Cefaclor Hives    Flexeril [Cyclobenzaprine] Hives    Robaxin [Methocarbamol] Unknown - High Severity       Assessment:     1.  ST elevation MI status post PCI to the OM severe disease in the LAD and circumflex.  On aspirin/Plavix  2.  Embolic stroke possible clot on catheter versus thrombus.  Status post TNK and thrombectomy.  No anticoagulation due to risk of hemorrhagic conversion  3.  Dysphagia status post PEG tube placed yesterday  4  acute systolic heart failure-off inotropic " agents tolerating small dose of beta-blocker difficulty with guideline directed medical therapy due to hypotension  5.  Hyponatremia  6.  Type 2 diabetes mellitus  7.  Severe ischemic cardiomyopathy EF 14%  8.       Plan:           BULMARO Munoz  11/03/24  15:04 EST  Electronically signed by BULMARO Munoz, 11/03/24, 3:04 PM EST.

## 2024-11-04 NOTE — CONSULTS
Chap paged to [employer].  No family present.  No family thought to be coming.  Chap remains available.

## 2024-11-04 NOTE — PROGRESS NOTES
Name: Albina Sosa ADMIT: 10/22/2024   : 1965  PCP: Germaine James APRN    MRN: [id number] LOS: 13 days   AGE/SEX: 59 y.o. female  ROOM: [facility]     Subjective   Subjective   Laying in bed, restless.  Spontaneous movement of left upper and lower extremity seen.  Remains nonverbal, follows some commands per RN report.      Review of Systems   UTO  Objective   Objective   Vital Signs  Temp:  [98.2 °F (36.8 °C)-98.6 °F (37 °C)] 98.2 °F (36.8 °C)  Heart Rate:  [81-87] 85  Resp:  [16-20] 20  BP: (110-136)/(75-90) 135/75  SpO2:  [92 %-100 %] 96 %  on  Flow (L/min) (Oxygen Therapy):  [2] 2;   Device (Oxygen Therapy): room air  Body mass index is 24.91 kg/m².  Physical Exam    General: Alert, laying in bed, nonverbal,  HEENT: Normocephalic, atraumatic  CV: Regular rate and rhythm, no murmurs rubs or gallops  Lungs: CTA anteriorly, no wheezing  Abdomen: Soft, nondistended, +abdominal binder  Extremities: Trace bilateral lower extremity edema, right-sided paresis.    Results Review     I reviewed the patient's new clinical results.  Results from last 7 days   Lab Units 24  0554   WBC 10*3/mm3 14.24* 13.33* 18.44* 17.03*   HEMOGLOBIN g/dL 12.6 11.9* 12.8 12.0   PLATELETS 10*3/mm3 554* 362 491* 464*     Results from last 7 days   Lab Units 24  0554   SODIUM mmol/L 145 138 136 135*   POTASSIUM mmol/L 3.1* 4.1 3.5 4.3   CHLORIDE mmol/L 101 99 100 100   CO2 mmol/L 30.0* 24.0 25.0 23.3   BUN mg/dL 49* 42* 36* 33*   CREATININE mg/dL 1.24* 1.22* 1.00 0.99   GLUCOSE mg/dL 162* 226* 107* 160*   Estimated Creatinine Clearance: 41.1 mL/min (A) (by C-G formula based on SCr of 1.24 mg/dL (H)).  Results from last 7 days   Lab Units 24  0523 24  0426 24  0447 24  0554 10/31/24  0623 10/30/24  0526   ALBUMIN g/dL 3.1* 2.7* 2.7*  2.5* 2.4*   < > 2.5*   BILIRUBIN mg/dL 0.2  --  0.3 0.3  --  0.5   ALK  PHOS U/L 183*  --  154* 165*  --  185*   AST (SGOT) U/L 39*  --  41* 46*  --  41*   ALT (SGPT) U/L 38*  --  37* 32  --  33    < > = values in this interval not displayed.     Results from last 7 days   Lab Units 11/04/24  0523 11/03/24  0426 11/02/24  0447 11/01/24  0554   CALCIUM mg/dL 10.1 9.4 8.8 8.6   ALBUMIN g/dL 3.1* 2.7* 2.7*  2.5* 2.4*   MAGNESIUM mg/dL 2.4 2.6 2.6 2.6   PHOSPHORUS mg/dL 4.6* 3.9 3.4 2.9     Results from last 7 days   Lab Units 11/01/24  0554   PROCALCITONIN ng/mL 0.13     SARS-CoV-2, SALVADOR   Date Value Ref Range Status   04/29/2022 NEGATIVE Negative Final     Comment:     The 2019-CoV rRT-PCR Assay is only for use under a Food and Drug Administration Emergency Use Authorization. The performance characteristics of the assay were verified by the Clinical Laboratory at [facility]. Results should be used in   conjunction with the patient's clinical symptoms, medical history, and other clinical/laboratory findings to determine an overall clinical diagnosis. Negative results do not preclude infection with SARS-CoV-2 (COVID-19).    Test parameters have not been validated for screening asymptomatic patients.     Glucose   Date/Time Value Ref Range Status   11/04/2024 0533 172 (H) 70 - 130 mg/dL Final   11/03/2024 2353 205 (H) 70 - 130 mg/dL Final   11/03/2024 1714 197 (H) 70 - 130 mg/dL Final   11/03/2024 1213 209 (H) 70 - 130 mg/dL Final   11/03/2024 0636 227 (H) 70 - 130 mg/dL Final   11/03/2024 0048 258 (H) 70 - 130 mg/dL Final   11/02/2024 2036 254 (H) 70 - 130 mg/dL Final           XR Chest 1 View  Narrative: XR CHEST 1 VW-     INDICATION: Hypoxia     COMPARISON: Chest radiographs dating back to 10/22/2024.     Impression: Central pulmonary vasculature remains dilated and indistinct  with very prominent pulmonary tissue markings bilaterally suggesting  pulmonary edema. Findings have mildly worsened since most recent  radiograph. No measurable pleural effusion or pneumothorax.  Cardiac  silhouette remains at the upper limits of normal with postsurgical  changes of CABG. Enteric tube courses below the diaphragm and outside  the exam field-of-view.     This report was finalized on 11/1/2024 7:44 AM by Dr. Jarret Johnson M.D on Workstation: [id number]       Scheduled Medications  aspirin, 81 mg, Nasogastric, Daily  atorvastatin, 40 mg, Oral, Nightly  castor oil-balsam peru, 1 Application, Topical, Q12H  cefTRIAXone, 1,000 mg, Intravenous, Q24H  chlorhexidine, 15 mL, Mouth/Throat, Q12H  clopidogrel, 75 mg, Nasogastric, Daily  enoxaparin, 30 mg, Subcutaneous, Nightly  famotidine, 20 mg, Nasogastric, Daily  insulin glargine, 30 Units, Subcutaneous, Q12H  insulin regular, 4-24 Units, Subcutaneous, Q6H  metoprolol tartrate, 12.5 mg, Oral, Q12H  torsemide, 40 mg, Nasogastric, Daily  venlafaxine, 37.5 mg, Nasogastric, BID    Infusions     Diet  NPO Diet NPO Type: Strict NPO, Tube Feeding    I have personally reviewed     [x]  Laboratory   []  Microbiology   []  Radiology   []  EKG/Telemetry  []  Cardiology/Vascular   []  Pathology    []  Records       Assessment/Plan     Active Hospital Problems    Diagnosis  POA    **STEMI involving left circumflex coronary artery [I21.21]  Yes    Hyperlipemia [E78.5]  Unknown    Acute hypoxic respiratory failure [J96.01]  Unknown    COPD (chronic obstructive pulmonary disease) [J44.9]  Unknown    Type 2 diabetes mellitus with hyperglycemia [E11.65]  Unknown    Ischemic cardiomyopathy [I25.5]  Unknown    Elevated liver enzymes [R74.8]  Unknown    Polysubstance abuse [F19.10]  Unknown    Peripheral vascular disease [I73.9]  Unknown    Acute ischemic left middle cerebral artery (MCA) stroke [I63.512]  No    Dysphagia as late effect of cerebrovascular accident (CVA) [I69.391]  Not Applicable      Resolved Hospital Problems   No resolved problems to display.       59 y.o. female  admitted to [facility] via EMS 10/22/24 for STEMI. She has history of coronary artery  disease with CAB with LIMA to the LAD, with hx of PCI to circumflex and left PDA, hypertension, mixed hyperlipidemia, type 2 diabetes on insulin therapy, prior CVA, history of methamphetamine use, and ongoing tobacco use with underlying lung disease.  She underwent emergent coronary catheterization and was found to have multivessel disease with severe cardiomyopathy and had a PCI with stent to culprit distal marginal branch with residual diffuse LAD and PDA disease with collaterals.  EF 14% by echo without obvious thrombus.  Post PCI she was noted to have increased agitation and right sided weakness with aphasia and stat neurology consult was placed and team D was activated.  CT of the head showed no acute hemorrhage or hypodensity, CTA of the head and neck was not performed at that time due to agitation.  Patient was given TNK and sent for angiogram out of concern for left MCA disease and had mechanical cranial artery embolectomy.          Acute large left MCA ischemic stroke  -status post TNK and embolectomy  -dual antiplatelet therapy and statin therapy  -Neurology is recommending BP systolic 140 or less  with strict blood pressure control to prevent hemorrhagic transformation  -Continue therapies PT and OT  -Remains confused, nonverbal, does not follow commands.+ Right upper and lower extremity weakness    Dysphagia secondary to above  -Status post PEG tube placement 11/2/2024  -Continue tube feeds         Acute STEMI  History of CAD/CABG  Severe ischemic cardiomyopathy  Acute on chronic heart failure with reduced ejection fraction  -Cardiac cath with multivessel disease with PCI/stent to mid marginal branch 10/22/2024  -Echocardiogram 10/28/2024 showed EF of 21-25%, global hypokinesis  -on aspirin and Plavix, statin   -Low-dose metoprolol, goal-directed medical therapy limited due to soft BP  -P.o. torsemide  -Status post milrinone drip  -Cardiology following       Hyperlipidemia  -atorvastatin  -goal LDL less  than 70      Acute hypoxic respiratory failure/COPD  -Initially requiring ventilator support . Liberated from ventilator 10/25 now on 3 L nasal cannula  -Pulmonary hygiene  -As needed DuoNebs  -Diuresis per nephrology       Type 2 diabetes, uncontrolled hyperglycemia   -A1c on admit 14.7  -Sugars over 600 on admission  -unclear on her adherence and DM regimen as an outpatient  -Blood glucose trending up, increase Lantus back to 30 units twice daily(was decreased yesterday due to procedure), -continue SSI         History of tobacco use disorder   -Recommend full cessation of tobacco     Hyponatremia  NICHO on CKD.  -Nephrology evaluated  -Creatinine slightly up.  Sodium normal  -Monitor           Elevated liver enzymes  -Likely from shock and low cardiac output.   -Improved         Positive UDS  -Positive for benzos and amphetamines on admission  -Continue to monitor for withdrawal  -Currently she is nonparticipatory in her care so access referral likely not helpful at this point     Peripheral vascular disease  -Continue dual antiplatelet, statin     Urinary retention  -Man catheter removed 10/25.  New Man was placed 11/30/2024    Leukocytosis  UTI  -Urine culture came back positive for E. coli, sensitive to ceftriaxone  -Continue IV ceftriaxone for 5 days          Goals of care  Discussed with spouse , KATHRYNADRIÁN  11/2/2024.  Discussed with patient with no significant progress, remains nonverbal, with right-sided weakness, does not follow commands.  Explained overall very poor prognosis in the setting of large MCA stroke with significant residual deficits, and severe cardiomyopathy.  Per goals of care discussion, he wanted CODE STATUS to be changed to DNR/DNI, updated in the system.  In addition discussed that current plan is home with home health, however I do not think patient is safe be discharged home due to significant comorbidities that would require extensive care.  Spouse reported that he does not want  her to go home with home health and wants her to go to LTC instead, but he wants to know locations per patient discharge, discussed with spouse that CCP will discuss with him.    ADRIÁN Handy noted that he is the next of kin and he wants to make all decisions, and does not want her daughter to make decisions about her care, however patient's treatment/clinical status can be discussed with daughter.      Lovenox 40 mg SC daily for DVT prophylaxis.  Full code.  DNR/DNI  Disposition: LTC likely stable to be discharged tomorrow.    Expected Discharge Date: 11/4/2024; Expected Discharge Time:        Copied text in this note has been reviewed and is accurate as of 11/04/24.         Dictated utilizing Dragon dictation        Hailey Ricketts MD  [facility]  11/04/24  09:22 EST

## 2024-11-04 NOTE — PROGRESS NOTES
[facility] Progress Note       Encounter Date:24  Patient:Albina Sosa  :1965  MRN:[id number]      Chief Complaint: Follow-up STEMI      Subjective:        No new cardiac issues.  Get PEG tube over the weekend      Review of Systems:  Review of Systems   Unable to perform ROS: Patient nonverbal       Medications:  Scheduled Meds:  aspirin, 81 mg, Nasogastric, Daily  atorvastatin, 40 mg, Oral, Nightly  castor oil-balsam peru, 1 Application, Topical, Q12H  cefTRIAXone, 1,000 mg, Intravenous, Q24H  chlorhexidine, 15 mL, Mouth/Throat, Q12H  clopidogrel, 75 mg, Nasogastric, Daily  enoxaparin, 30 mg, Subcutaneous, Nightly  famotidine, 20 mg, Nasogastric, Daily  insulin glargine, 30 Units, Subcutaneous, Q12H  insulin regular, 4-24 Units, Subcutaneous, Q6H  metoprolol tartrate, 12.5 mg, Oral, Q12H  torsemide, 40 mg, Nasogastric, Daily  venlafaxine, 37.5 mg, Nasogastric, BID    Continuous Infusions:     PRN Meds:    acetaminophen    dextrose    dextrose    glucagon (human recombinant)    hydrALAZINE    HYDROcodone-acetaminophen    ipratropium-albuterol    ipratropium-albuterol    loperamide    nitroglycerin    OLANZapine    ondansetron ODT **OR** ondansetron    [COMPLETED] Insert Peripheral IV **AND** sodium chloride         Objective:       Vitals:    24 2350 24 0405 24 0420 24 0837   BP: 119/81 120/80  135/75   BP Location: Left arm Left arm  Left arm   Patient Position: Lying Lying  Lying   Pulse: 81 82 81 85   Resp: 18 18  20   Temp: 98.6 °F (37 °C) 98.6 °F (37 °C)  98.2 °F (36.8 °C)   TempSrc: Oral Oral  Axillary   SpO2: 96% 98% 92% 96%   Weight:   59.8 kg (131 lb 13.4 oz)    Height:               Physical Exam:  Constitutional: Chronically ill-appearing, frail, no acute distress   HENT: Oropharynx clear and membrane moist, NG in place  Eyes: Normal conjunctiva, no sclera icterus.  Neck: Supple, no carotid bruit bilaterally.  Cardiovascular: Regular and  "Tachycardia rate and rhythm, No Murmur, No bilateral lower extremity edema.  Pulmonary: Normal respiratory effort, coarse lung sounds, no wheezing.  Abdominal: Soft, nontender, no hepatosplenomegaly, liver is non-pulsatile.  Neurological: Still fairly sleepy not following commands well does move L leg  Skin: Warm, dry, no ecchymosis, no rash.  Psych: Unable to assess.           Lab Review:   Results from last 7 days   Lab Units 11/04/24 0523 11/03/24 0426 11/02/24 0447 11/01/24 0554 10/31/24  0623 10/30/24  0526 10/29/24  0328   SODIUM mmol/L 145 138 136 135* 138 141 145   POTASSIUM mmol/L 3.1* 4.1 3.5 4.3 3.3* 3.5 4.1  4.1   CHLORIDE mmol/L 101 99 100 100 100 98 108*   CO2 mmol/L 30.0* 24.0 25.0 23.3 27.3 30.7* 28.6   BUN mg/dL 49* 42* 36* 33* 35* 46* 43*   CREATININE mg/dL 1.24* 1.22* 1.00 0.99 1.12* 1.26* 1.50*   GLUCOSE mg/dL 162* 226* 107* 160* 145* 165* 144*   CALCIUM mg/dL 10.1 9.4 8.8 8.6 8.5* 8.3* 8.4*   AST (SGOT) U/L 39*  --  41* 46*  --  41*  --    ALT (SGPT) U/L 38*  --  37* 32  --  33  --            Results from last 7 days   Lab Units 11/04/24 0523 11/03/24 0426 11/02/24 0447 11/01/24  0554 10/31/24  0623 10/30/24  0526 10/28/24  2353   WBC 10*3/mm3 14.24* 13.33* 18.44* 17.03* 13.13* 13.60* 14.60*   HEMOGLOBIN g/dL 12.6 11.9* 12.8 12.0 11.7* 12.7 11.9*   HEMATOCRIT % 37.5 36.9 37.7 35.8 35.7 37.7 36.9   PLATELETS 10*3/mm3 554* 362 491* 464* 471* 442 465*           Results from last 7 days   Lab Units 11/04/24  0523 11/03/24  0426 11/02/24  0447 11/01/24  0554 10/31/24  0623 10/30/24  0526   MAGNESIUM mg/dL 2.4 2.6 2.6 2.6 2.4 2.6           Invalid input(s): \"LDLCALC\"    Results from last 7 days   Lab Units 11/01/24  0554   PROBNP pg/mL 17,419.0*               Echocardiogram 10/23/2024:  Left ventricular systolic function is severely decreased. Calculated left ventricular EF = 14.2%, visually estimated LVEF 20-25%.  There is global hypokinesis.  Additionally, the following left ventricular wall " segments are akinetic: basal anterolateral, mid anterolateral, apical lateral, basal inferolateral, mid inferolateral and apex.  Left ventricular wall thickness is consistent with moderate concentric hypertrophy.  Left ventricular mass index is increased.  May consider infiltrative cardiomyopathy in the appropriate clinical setting.  No obvious LV thrombus noted including on echo contrast images.  Mild-moderate mitral regurgitation.  Borderline left atrial enlargement, normal RA and IVC size.  There is a small (<1cm) pericardial effusion. There is no evidence of cardiac tamponade.    Cardiac Catheterization 10/22/2024:  Severe multivessel coronary artery disease with 100% ostial LAD, circumflex contains 100% stenosis of the mid marginal branch at the distal stent edge likely culprit for STEMI presentation, left-sided PDA from the circumflex has 100% proximal segment stenoses with left to left collaterals from a LIMA to LAD supplying the distal PDA, patent LIMA to LAD LAD is diffusely diseased small in caliber size with sequential 80% stenoses.  LVEDP of approximately 30 mmHg  Ejection fraction 10%  Successful PCI to mid marginal with placement of a 2.25 x 12 mm Xience drug-eluting stent deployed initially at 8 keon with her stent balloon back slightly postdilated to 16 at  [place] to right femoral artery  Patient developed acute neurologic changes at the end of the case when we went to load her with aspirin and Plavix.  She was noted to be fairly unresponsive even after reversal with Narcan and flumazenil code stroke was called she was assessed emergently and taken off her head CT       Assessment:          Diagnosis Plan   1. Acute ST elevation myocardial infarction (STEMI), unspecified artery  Ambulatory Referral to Cardiac Rehab      2. Smoker        3. H/O medication noncompliance        4. Acute pulmonary edema        5. Dysphagia as late effect of cerebrovascular accident (CVA)  Case Request    Case Request              Plan:       Ms. Sosa is a 59 y.o. woman with past medical history notable for coronary disease status post LIMA to LAD percutaneous interventions to her circumflex and left-sided PDA, hypertension, mixed hyperlipidemia, diabetes type 2 on insulin therapy, history of stroke, history of methamphetamine use, and ongoing tobacco abuse with underlying lung disease who presented to the emergency room with a day or 2 of acute onset chest discomfort on 10/22 was noted to have ST changes concerning for STEMI.  She was taken emergently to the Cath Lab where she was found to have multivessel disease some chronic and on her mid marginal branch which fit with her EKG changes was felt to be the acute lesion and behaved so.  This was revascularized and stent placed.  She was also noted to have occlusion of her left dominant circumflex however this behave more chronic given that there was already collateralization and wire would not pass.  She was also found to have severe cardiomyopathy which is new compared to at least stress findings from 2 years ago.  Unfortunately patient had a stroke symptoms at the end of the case she was taken emergently had TNK and subsequently taken for thrombectomy despite that still had a fairly sizable stroke on the left side.  Patient was intubated for approximately 5 days after her infarct and stroke.  Unfortunately neurostatus has not improved and has remained severely debilitated.  Palliative care discussions were held 10/30 family is wanting to do anything possible to allow her to recover.  They would be okay with PEG tube.  From a cardiac standpoint doing what we can with limited options given the severe damage that patient had with her heart and underlying dysfunction.  She was placed on milrinone infusion on 10/27 she is responded reasonably well to diuresis and milrinone was weaned 11/1.  Kidney function seems reasonably stable and now on room air.  She did get a PEG tube.  From a  cardiac standpoint she is stable although with pretty advanced cardiomyopathy and dense stroke.  Current plans are to try and get her set up for discharge to rehab.  Would continue with current regimen.        STEMI:  Mid marginal branch felt to be culprit however patient with severe chronic disease throughout the LAD and circumflex and left-sided PDA  Status post PCI 10/22  Was loaded with aspirin and clopidogrel in the Cath Lab 10/2022  Continue aspirin and clopidogrel  Continue low-dose metoprolol  Follow-up echocardiogram 10/28 does not show clear evidence of left ventricular.  Would not be a great candidate for anticoagulation at this juncture specially in light of high risk for hemorrhagic conversion of her stroke    Stroke:  Likely cardioembolic unclear if from left ventricular thrombus or clot on the catheter  Status post TNK and thrombectomy  Neurology following  Now on aspirin and clopidogrel  Limited to no improvement neurologically  Status post PEG tube    Acute systolic congestive heart failure:  Severely elevated LVEDP of 40 mmHg in the Cath Lab  Continue diuresis  Milrinone 0.25 mcg started 10/27 to a lower dose 10/31 and stopped 11/1.    Diabetes type 2:   Elevated blood sugars noted but no evidence of acidosis think it is more of a hyperglycemia  Appreciate critical care assistance with managing her severe hyperglycemia     Acute hyponatremia:  Has improved and now hypernatremic treating with free water flushes appreciate renal insight             Bret Lawrence MD  [facility]  11/04/24  08:54 EST        Addendum:  Was notified by our team that unfortunately the patient had a ventricular fibrillation arrest around 14:19.  She was made DNR over the weekend by the primary and hospitalist team after discussion with her  according to their notes and thus CPR resuscitative efforts were not performed.  And she did pass away and was pronounced dead severe terrible situation to  try to reach out to the  to give him updates it sounds like the hospital did reach out and talk with him through our rounding nurses.  When I reviewed telemetry strips looks like she had about 3 PVCs leading into this and then quickly went into ventricular fibrillation immediately.  She had not had any prior arrhythmias leading up to this point.  Could be related to her underlying cardiomyopathy and low ejection fraction.  Would stop inotropic therapy and she been on beta-blocker therapy in hopes of trying to avoid arrhythmias and optimizing her heart function post myocardial infarction.    Bret Lawrence MD  [facility]  11/04/24  15:57 EST

## 2024-11-04 NOTE — NURSING NOTE
"This nurse received call from CTU, pt was lying in bed, Vfib on tele monitor. Spouse, Caesar, was immediately contact to verify code status, he is non-ambulatory so would not be able to visit. Pt  while on the phone with spouse. Pt's sister, aunt, and nephew came to pay respects, no xochilt needed. Belongings sent with sister who is going to visit pt's  in LTC facility.   LUE restraint had been removed for trial at 1400 assessment, \"Discontinued\" at 1425. PIV and f/c removed.   "

## 2024-11-04 NOTE — DISCHARGE SUMMARY
Name: Albina Sosa  :  1965  MRN: [id number]         Primary Care Physician: Germaine James, BULMARO      Date of Admission:  10/22/2024  Date and Time of Death:   at 14:30    Principle Cause of Death:   Ischemic cardiomyopathy    Secondary Diagnoses:   Acute large left middle cerebral artery ischemic stroke     STEMI involving left circumflex coronary artery    Acute ischemic left middle cerebral artery (MCA) stroke    Hyperlipemia    Acute hypoxic respiratory failure    COPD (chronic obstructive pulmonary disease)    Type 2 diabetes mellitus with hyperglycemia    Ischemic cardiomyopathy    Elevated liver enzymes    Polysubstance abuse    Peripheral vascular disease    Dysphagia as late effect of cerebrovascular accident (CVA)        59 y.o. female admitted to [facility] via EMS 10/22/24 for STEMI. She has history of coronary artery disease with CAB with LIMA to the LAD, with hx of PCI to circumflex and left PDA, hypertension, mixed hyperlipidemia, type 2 diabetes on insulin therapy, prior CVA, history of methamphetamine use, and ongoing tobacco use with underlying lung disease. She underwent emergent coronary catheterization and was found to have multivessel disease with severe cardiomyopathy and had a PCI with stent to culprit distal marginal branch with residual diffuse LAD and PDA disease with collaterals.  Initial EF 14% by echo on 10/23/2024 without obvious thrombus. Post PCI she was noted to have increased agitation and right sided weakness with aphasia and stat neurology consult was placed and team D was activated. CT of the head showed no acute hemorrhage or hypodensity, CTA of the head and neck was not performed at that time due to agitation. Patient was given TNK and sent for angiogram out of concern for left MCA disease and had mechanical cranial artery embolectomy.  Patient was initiated on dual antiplatelets, statin per neurology recommendations.  Was not a good candidate for anticoagulation due to  concern for hemorrhagic conversion in the setting of large stroke.  Cardiology was following for severe cardiomyopathy, patient  require milrinone drip during admission, was on Brilinta platelets, statin as patient previously, and low-dose metoprolol per cardiology.  Patient continued to have significant residual deficits from the stroke, right-sided paresis, remained nonverbal, required PEG tube placement.  In the setting of severe cardiomyopathy and large CVA with residual deficits, discussed goals of care with spouse on OLGA LIDIA PERALTAE 11/2/2024, and CODE STATUS was changed to DNR/DNI per request.  On 11/4/2024 patient ventricular fibrillation arrest, no CPR resuscitative efforts were performed as patient was DNR/DNI.  Patient passed away at 14:30 PM      Patient was initially seen in the morning.  See progress note    Hailey Ricketts MD  11/04/24  16:29 EST

## 2024-11-04 NOTE — CASE MANAGEMENT/SOCIAL WORK
Case Management Discharge Note      Final Note: Patient  24 @ 1430    Provided Post Acute Provider List?: N/A  N/A Provider List Comment: Pending clinical course, pt remains intubated, unsure yet of needs  Provided Post Acute Provider Quality & Resource List?: N/A  N/A Quality & Resource List Comment: Pending clinical course, pt remains intubated, unsure yet of needs    Selected Continued Care - Admitted Since 10/22/2024       Destination    No services have been selected for the patient.                Durable Medical Equipment    No services have been selected for the patient.                Dialysis/Infusion    No services have been selected for the patient.                Home Medical Care    No services have been selected for the patient.                Therapy    No services have been selected for the patient.                Community Resources    No services have been selected for the patient.                Community & DME    No services have been selected for the patient.                         Final Discharge Disposition Code: 20 -

## 2024-11-04 NOTE — PROGRESS NOTES
Nutrition Services    Patient Name: Albina Sosa  YOB: 1965  MRN: [id number]  Admission date: 10/22/2024    PROGRESS NOTE      Encounter Information: Pt tolerating Novasource Renal well at goal rate per RN. PEG placed 11/2. Diuresing on torsemide. Nephrology no longer following, RD will adjust free water flushes based on lab values.        PO Diet: NPO Diet NPO Type: Strict NPO, Tube Feeding   PO Supplements: -   PO Intake:  -       Current nutrition support: Novasource Renal at 35 ml/hr goal rate   Nutrition support review: Tolerating       Labs (reviewed below): Na 145, K 3.1, Phos 4.6       GI Function:  Last BM 10/27       Nutrition Intervention Updates: Continue current TF regimen  Continue flushing Satish BID  Increase free water flushes to 50 ml q 2 hrs.        Results from last 7 days   Lab Units 11/04/24 0523 11/03/24 0426 11/02/24 0447 11/01/24  0554   SODIUM mmol/L 145 138 136 135*   POTASSIUM mmol/L 3.1* 4.1 3.5 4.3   CHLORIDE mmol/L 101 99 100 100   CO2 mmol/L 30.0* 24.0 25.0 23.3   BUN mg/dL 49* 42* 36* 33*   CREATININE mg/dL 1.24* 1.22* 1.00 0.99   CALCIUM mg/dL 10.1 9.4 8.8 8.6   BILIRUBIN mg/dL 0.2  --  0.3 0.3   ALK PHOS U/L 183*  --  154* 165*   ALT (SGPT) U/L 38*  --  37* 32   AST (SGOT) U/L 39*  --  41* 46*   GLUCOSE mg/dL 162* 226* 107* 160*     Results from last 7 days   Lab Units 11/04/24 0523 11/03/24 0426 11/02/24  0447   MAGNESIUM mg/dL 2.4 2.6 2.6   PHOSPHORUS mg/dL 4.6* 3.9 3.4   HEMOGLOBIN g/dL 12.6 11.9* 12.8   HEMATOCRIT % 37.5 36.9 37.7     SARS-CoV-2, SALVADOR   Date Value Ref Range Status   04/29/2022 NEGATIVE Negative Final     Comment:     The 2019-CoV rRT-PCR Assay is only for use under a Food and Drug Administration Emergency Use Authorization. The performance characteristics of the assay were verified by the Clinical Laboratory at [facility]. Results should be used in   conjunction with the patient's clinical symptoms, medical history, and other  clinical/laboratory findings to determine an overall clinical diagnosis. Negative results do not preclude infection with SARS-CoV-2 (COVID-19).    Test parameters have not been validated for screening asymptomatic patients.     Lab Results   Component Value Date    HGBA1C 14.70 (H) 10/23/2024       Wt Readings from Last 10 Encounters:   11/04/24 0420 59.8 kg (131 lb 13.4 oz)   11/02/24 0610 61.9 kg (136 lb 7.4 oz)   10/30/24 0600 60.2 kg (132 lb 11.5 oz)   10/29/24 0438 60.2 kg (132 lb 11.5 oz)   10/28/24 1705 49 kg (108 lb)   10/28/24 0640 49.3 kg (108 lb 11 oz)   10/26/24 0200 49.1 kg (108 lb 3.9 oz)   10/24/24 0500 51.1 kg (112 lb 10.5 oz)   10/23/24 1252 56.2 kg (124 lb)   10/23/24 0238 56.5 kg (124 lb 9 oz)   10/22/24 1700 54 kg (119 lb 0.8 oz)   10/22/24 1214 54 kg (119 lb 0.8 oz)       RD to follow up per protocol.    Electronically signed by:  Ernie Engle RDN, LD  11/04/24 09:55 EST

## 2024-11-04 NOTE — PLAN OF CARE
Goal Outcome Evaluation:  Plan of Care Reviewed With: patient        Progress: no change  Outcome Evaluation: VSS overnight. oxygen stable on room air. turn q2hr. tube feedings infusing at goal rate through peg tube without issue. f/c to bsd. wound care provided to DTI on coccyx. L wrist restraint in place d/t patient pulling at abd binder/heart monitor and lines. patient moves L arm and L leg freely on own, has been able to follow directions to turn to right side and grab onto side rail with L hand for dressing changes and turns etc, R side remains flaccid. oral care provided, patient remains aphasic. NIH=25. awaiting d/c planning for LTC facility.                           Problem: Restraint, Nonviolent  Goal: Absence of Harm or Injury  Intervention: Implement Least Restrictive Safety Strategies  Recent Flowsheet Documentation  Taken 11/4/2024 0425 by Marlene Julien RN  Medical Device Protection: tubing secured  Diversional Activities: television  Taken 11/4/2024 0400 by Marlene Julien RN  Medical Device Protection: tubing secured  Diversional Activities: television  Taken 11/4/2024 0223 by Marlene Julien RN  Medical Device Protection: tubing secured  Taken 11/4/2024 0200 by Marlene Julien RN  Medical Device Protection: tubing secured  Diversional Activities: music  Taken 11/4/2024 0000 by Marlene Julien RN  Medical Device Protection: tubing secured  Diversional Activities: television  Taken 11/3/2024 2200 by Marlene Julien RN  Medical Device Protection: tubing secured  Diversional Activities: television  Taken 11/3/2024 2041 by Marlene Julien RN  Diversional Activities: television  Taken 11/3/2024 2000 by Marlene Julien RN  Medical Device Protection:   tubing secured   torso covered  Diversional Activities: television  Intervention: Protect Dignity, Rights and Personal Wellbeing  Recent Flowsheet Documentation  Taken 11/3/2024 2041 by Marlene Julien,  RN  Trust Relationship/Rapport:   care explained   reassurance provided  Intervention: Protect Skin and Joint Integrity  Recent Flowsheet Documentation  Taken 11/4/2024 0425 by Marlene Julien RN  Body Position:   turned   supine  Taken 11/4/2024 0223 by Marlene Julien RN  Body Position:   turned   side-lying   left  Taken 11/4/2024 0001 by Marlene Julien RN  Body Position:   turned   right   side-lying  Taken 11/3/2024 2211 by Marlene Julien RN  Body Position:   turned   left   side-lying  Taken 11/3/2024 2041 by Marlene Julien RN  Body Position:   turned   right   side-lying  Skin Protection: pulse oximeter probe site changed     Problem: Skin Injury Risk Increased  Goal: Skin Health and Integrity  Intervention: Optimize Skin Protection  Recent Flowsheet Documentation  Taken 11/4/2024 0425 by Marlene Julien RN  Activity Management: bedrest  Head of Bed (HOB) Positioning: HOB at 30-45 degrees  Taken 11/4/2024 0223 by Marlene Julien RN  Activity Management: bedrest  Head of Bed (HOB) Positioning: HOB at 30-45 degrees  Taken 11/4/2024 0001 by Marlene Julien RN  Activity Management: bedrest  Head of Bed (HOB) Positioning: HOB at 30-45 degrees  Taken 11/3/2024 2211 by Marlene Julien RN  Activity Management: bedrest  Head of Bed (HOB) Positioning: HOB at 30-45 degrees  Taken 11/3/2024 2041 by Marlene Julien RN  Activity Management: bedrest  Pressure Reduction Techniques:   heels elevated off bed   frequent weight shift encouraged   weight shift assistance provided  Head of Bed (HOB) Positioning: HOB at 30-45 degrees  Pressure Reduction Devices: specialty bed utilized  Skin Protection: pulse oximeter probe site changed

## 2024-11-05 NOTE — PLAN OF CARE
Goal Outcome Evaluation: Pt  1430.      Problem: Adult Inpatient Plan of Care  Goal: Plan of Care Review  Outcome: Unable to Meet  Goal: Patient-Specific Goal (Individualized)  Outcome: Unable to Meet  Goal: Absence of Hospital-Acquired Illness or Injury  Outcome: Unable to Meet  Goal: Optimal Comfort and Wellbeing  Outcome: Unable to Meet  Goal: Readiness for Transition of Care  Outcome: Unable to Meet     Problem: Skin Injury Risk Increased  Goal: Skin Health and Integrity  Outcome: Unable to Meet     Problem: Restraint, Nonviolent  Goal: Absence of Harm or Injury  Outcome: Unable to Meet     Problem: Mechanical Ventilation Invasive  Goal: Optimal Nutrition Delivery  Outcome: Unable to Meet  Goal: Absence of Device-Related Skin and Tissue Injury  Outcome: Unable to Meet     Problem: Chest Pain  Goal: Resolution of Chest Pain Symptoms  Outcome: Unable to Meet     Problem: Fall Injury Risk  Goal: Absence of Fall and Fall-Related Injury  Outcome: Unable to Meet     Problem: Enteral Nutrition  Goal: Absence of Aspiration Signs and Symptoms  Outcome: Unable to Meet  Goal: Safe, Effective Therapy Delivery  Outcome: Unable to Meet  Goal: Feeding Tolerance  Outcome: Unable to Meet     Problem: Sepsis/Septic Shock  Goal: Optimal Coping  Outcome: Unable to Meet  Goal: Absence of Bleeding  Outcome: Unable to Meet  Goal: Blood Glucose Level Within Target Range  Outcome: Unable to Meet  Goal: Absence of Infection Signs and Symptoms  Outcome: Unable to Meet  Goal: Optimal Nutrition Delivery  Outcome: Unable to Meet

## 2024-11-05 NOTE — PAYOR COMM NOTE
"Albina Sosa (Dcsd. Female)          DC SUMMARY FOR [id number]     CONTACT F# [phone]         Date of Birth   1965    Social Security Number       Address   [address] [place], [place] [address]    Home Phone   [phone]    MRN   [id number]       [facility]    Marital Status                               Admission Date   10/22/24    Admission Type   Emergency    Admitting Provider   Bret Lawrence MD    Attending Provider       Department, Room/Bed   [address], P585/1       Discharge Date   2024    Discharge Disposition       Discharge Destination                                 Attending Provider: (none)   Allergies: Bactrim [Sulfamethoxazole-trimethoprim], Cefaclor, Flexeril [Cyclobenzaprine], Robaxin [Methocarbamol]    Isolation: None   Infection: None   Code Status: Prior    Ht: 154.9 cm (61\")   Wt: 59.8 kg (131 lb 13.4 oz)    Admission Cmt: None   Principal Problem: STEMI involving left circumflex coronary artery [I21.21]                   Active Insurance as of 10/22/2024       Primary Coverage       Payor Plan Insurance Group Employer/Plan Group    [place] [id number]       Payor Plan Address Payor Plan Phone Number Payor Plan Fax Number Effective Dates    [employer] [address] [address] [phone]  2021 - None Entered    [facility] [id number]         Subscriber Name Subscriber Birth Date Member ID       ALBINA SOSA 1965 [id number]                     Emergency Contacts        (Rel.) Home Phone Work Phone Mobile Phone    ADRIÁN SOSA (Spouse) -- -- [phone]    MeiAra (Daughter) -- -- [phone]                 Discharge Summary        Hailey Ricketts MD at 24 1629          Name: Albina Sosa  :  1965  MRN: [id number]         Primary Care Physician: Germaine James APRN      Date of Admission:  10/22/2024  Date and Time of Death:   at " 14:30    Principle Cause of Death:   Ischemic cardiomyopathy    Secondary Diagnoses:   Acute large left middle cerebral artery ischemic stroke     STEMI involving left circumflex coronary artery    Acute ischemic left middle cerebral artery (MCA) stroke    Hyperlipemia    Acute hypoxic respiratory failure    COPD (chronic obstructive pulmonary disease)    Type 2 diabetes mellitus with hyperglycemia    Ischemic cardiomyopathy    Elevated liver enzymes    Polysubstance abuse    Peripheral vascular disease    Dysphagia as late effect of cerebrovascular accident (CVA)        59 y.o. female admitted to [facility] via EMS 10/22/24 for STEMI. She has history of coronary artery disease with CAB with LIMA to the LAD, with hx of PCI to circumflex and left PDA, hypertension, mixed hyperlipidemia, type 2 diabetes on insulin therapy, prior CVA, history of methamphetamine use, and ongoing tobacco use with underlying lung disease. She underwent emergent coronary catheterization and was found to have multivessel disease with severe cardiomyopathy and had a PCI with stent to culprit distal marginal branch with residual diffuse LAD and PDA disease with collaterals.  Initial EF 14% by echo on 10/23/2024 without obvious thrombus. Post PCI she was noted to have increased agitation and right sided weakness with aphasia and stat neurology consult was placed and team D was activated. CT of the head showed no acute hemorrhage or hypodensity, CTA of the head and neck was not performed at that time due to agitation. Patient was given TNK and sent for angiogram out of concern for left MCA disease and had mechanical cranial artery embolectomy.  Patient was initiated on dual antiplatelets, statin per neurology recommendations.  Was not a good candidate for anticoagulation due to concern for hemorrhagic conversion in the setting of large stroke.  Cardiology was following for severe cardiomyopathy, patient  require milrinone drip during admission, was on  Brilinta platelets, statin as patient previously, and low-dose metoprolol per cardiology.  Patient continued to have significant residual deficits from the stroke, right-sided paresis, remained nonverbal, required PEG tube placement.  In the setting of severe cardiomyopathy and large CVA with residual deficits, discussed goals of care with spouse on ADRIÁN PERALTA 11/2/2024, and CODE STATUS was changed to DNR/DNI per request.  On 11/4/2024 patient ventricular fibrillation arrest, no CPR resuscitative efforts were performed as patient was DNR/DNI.  Patient passed away at 14:30 PM      Patient was initially seen in the morning.  See progress note    Hailey Ricketts MD  11/04/24  16:29 EST          Electronically signed by Hailey Ricketts MD at 11/04/24 4727

## 2024-11-06 LAB
BACTERIA SPEC AEROBE CULT: NORMAL
BACTERIA SPEC AEROBE CULT: NORMAL

## (undated) DEVICE — APPL CHLORAPREP HI/LITE 26ML ORNG

## (undated) DEVICE — TUBING, SUCTION, 1/4" X 10', STRAIGHT: Brand: MEDLINE

## (undated) DEVICE — Device: Brand: PROWATER

## (undated) DEVICE — PINNACLE R/O II INTRODUCER SHEATH WITH RADIOPAQUE MARKER: Brand: PINNACLE

## (undated) DEVICE — ST ACC MICROPUNCTURE STFF .018 ECHO/PLAT/TP 4F/10CM 21G/7CM

## (undated) DEVICE — ST FLTR IV POSIDYNE W/EXT/TBG 2.7ML

## (undated) DEVICE — RADIFOCUS GLIDEWIRE: Brand: GLIDEWIRE

## (undated) DEVICE — SYR LUERLOK 20CC BX/50

## (undated) DEVICE — GW FC J TFE/COAT .035 3MM 145CM

## (undated) DEVICE — Device: Brand: D-STAT® DRY SILVER CLEAR TOPICAL HEMOSTAT

## (undated) DEVICE — TOWEL,OR,DSP,ST,BLUE,STD,4/PK,20PK/CS: Brand: MEDLINE

## (undated) DEVICE — DEV PRESS VAC VACLOC SYR 60ML

## (undated) DEVICE — BNDR ABD 4PANEL 12IN MED/LG

## (undated) DEVICE — TBG CONN ASP PENUMBRASYSTEM HIFLO MAX/0.110IN

## (undated) DEVICE — BLCK/BITE BLOX W/DENTL/RIM W/STRAP 54F

## (undated) DEVICE — GLV SURG SENSICARE PI MIC PF SZ7.5 LF STRL

## (undated) DEVICE — PK ANGIO CERBRL RAD 40

## (undated) DEVICE — Device

## (undated) DEVICE — SYR LUERLOK 30CC

## (undated) DEVICE — PERCLOSE PROGLIDE™ SUTURE-MEDIATED CLOSURE SYSTEM: Brand: PERCLOSE PROGLIDE™

## (undated) DEVICE — BASN GW RINGMASTER

## (undated) DEVICE — KT MANIFLD CARDIAC

## (undated) DEVICE — BG WAST DISPOSABLE DEPOT W/TBG48IN S/COCK SPK1400

## (undated) DEVICE — SYR LL TP 10ML STRL

## (undated) DEVICE — KT ORCA ORCAPOD DISP STRL

## (undated) DEVICE — CANSTR SXN PENUMBRA ENGINE

## (undated) DEVICE — PERCLOSE™ PROSTYLE™ SUTURE-MEDIATED CLOSURE AND REPAIR SYSTEM: Brand: PERCLOSE™ PROSTYLE™

## (undated) DEVICE — GW TORQFLX SS .018IN 40CM

## (undated) DEVICE — DGW .035 FC J3MM 260CM TEF: Brand: EMERALD

## (undated) DEVICE — GUIDEWIRES: Brand: TRAXCESS GUIDEWIRE

## (undated) DEVICE — TREK CORONARY DILATATION CATHETER 2.50 MM X 12 MM / RAPID-EXCHANGE: Brand: TREK

## (undated) DEVICE — CATH GUIDE LAUNCHER EBU3.5 6F 100CM

## (undated) DEVICE — ZOOM 055 137 CM RC: Brand: ZOOM™ REPERFUSION CATHETER

## (undated) DEVICE — KT PEG ENDOVIVE ENFIT SFTY PUSH 20F 1P/U

## (undated) DEVICE — FEMORAL ENTRY ANGIOGRAPHY SHIELD-YELLOW: Brand: RADPAD

## (undated) DEVICE — SOL NACL 0.9PCT 1000ML

## (undated) DEVICE — ZOOM 035 158 CM RC: Brand: ZOOM™ REPERFUSION CATHETER

## (undated) DEVICE — PINNACLE INTRODUCER SHEATH: Brand: PINNACLE

## (undated) DEVICE — SUT SILK 2/0 SH 30IN K833H

## (undated) DEVICE — GW STD/PROF ARISTOTLE24 0.024IN 200CM

## (undated) DEVICE — STPCK 3/WY HP M/RA W/OFF/HNDL 1050PSI STRL

## (undated) DEVICE — RADIFOCUS TORQUE DEVICE MULTI-TORQUE VISE: Brand: RADIFOCUS TORQUE DEVICE

## (undated) DEVICE — CATH DIAG IMPULSE FR4 5F 100CM

## (undated) DEVICE — HEARTRAIL III GUIDING CATHETER: Brand: HEARTRAIL

## (undated) DEVICE — GLIDESHEATH SLENDER STAINLESS STEEL KIT: Brand: GLIDESHEATH SLENDER

## (undated) DEVICE — CVR PROB 96IN LF STRL

## (undated) DEVICE — SENSR O2 OXIMAX FNGR A/ 18IN NONSTR

## (undated) DEVICE — ADAPT CLN BIOGUARD AIR/H2O DISP

## (undated) DEVICE — ZOOM 071 137 CM RC: Brand: ZOOM™ REPERFUSION CATHETER

## (undated) DEVICE — CATH DIAG IMPULSE FL4 5F 100CM

## (undated) DEVICE — PK CATH CARD 40

## (undated) DEVICE — COVER,TABLE,44X90,STERILE: Brand: MEDLINE

## (undated) DEVICE — MSK PROC CURAPLEX O2 2/ADAPT 7FT

## (undated) DEVICE — SYS ACC BENCHMARK BMX96 W/CATH STR SIM 6F 80X150CM

## (undated) DEVICE — LOU PACE DEFIB: Brand: MEDLINE INDUSTRIES, INC.

## (undated) DEVICE — RUNTHROUGH NS EXTRA FLOPPY PTCA GUIDEWIRE: Brand: RUNTHROUGH

## (undated) DEVICE — SUT SILK 2/0 FS BLK 18IN 685G

## (undated) DEVICE — CATH DIAG IMPULSE PIG145 6F 110CM

## (undated) DEVICE — SYR LUERLOK 5CC